# Patient Record
Sex: MALE | Race: WHITE | NOT HISPANIC OR LATINO | URBAN - METROPOLITAN AREA
[De-identification: names, ages, dates, MRNs, and addresses within clinical notes are randomized per-mention and may not be internally consistent; named-entity substitution may affect disease eponyms.]

---

## 2018-03-29 ENCOUNTER — EMERGENCY (EMERGENCY)
Facility: HOSPITAL | Age: 73
LOS: 1 days | Discharge: HOME | End: 2018-03-29
Attending: EMERGENCY MEDICINE | Admitting: EMERGENCY MEDICINE
Payer: SELF-PAY

## 2018-03-29 VITALS
HEART RATE: 112 BPM | TEMPERATURE: 99 F | OXYGEN SATURATION: 97 % | WEIGHT: 164.91 LBS | RESPIRATION RATE: 14 BRPM | DIASTOLIC BLOOD PRESSURE: 78 MMHG | SYSTOLIC BLOOD PRESSURE: 116 MMHG

## 2018-03-29 VITALS
DIASTOLIC BLOOD PRESSURE: 72 MMHG | OXYGEN SATURATION: 99 % | HEART RATE: 92 BPM | RESPIRATION RATE: 18 BRPM | SYSTOLIC BLOOD PRESSURE: 118 MMHG

## 2018-03-29 LAB
ALBUMIN SERPL ELPH-MCNC: 3.2 G/DL — LOW (ref 3.3–5)
ALP SERPL-CCNC: 352 U/L — HIGH (ref 40–120)
ALT FLD-CCNC: 39 U/L — SIGNIFICANT CHANGE UP (ref 12–78)
ANION GAP SERPL CALC-SCNC: 8 MMOL/L — SIGNIFICANT CHANGE UP (ref 5–17)
APPEARANCE UR: CLEAR — SIGNIFICANT CHANGE UP
AST SERPL-CCNC: 67 U/L — HIGH (ref 15–37)
BACTERIA # UR AUTO: NEGATIVE — SIGNIFICANT CHANGE UP
BASOPHILS # BLD AUTO: 0.2 K/UL — SIGNIFICANT CHANGE UP (ref 0–0.2)
BASOPHILS NFR BLD AUTO: 1.2 % — SIGNIFICANT CHANGE UP (ref 0–2)
BILIRUB SERPL-MCNC: 1.6 MG/DL — HIGH (ref 0.2–1.2)
BILIRUB UR-MCNC: ABNORMAL
BUN SERPL-MCNC: 31 MG/DL — HIGH (ref 7–23)
CALCIUM SERPL-MCNC: 9.2 MG/DL — SIGNIFICANT CHANGE UP (ref 8.5–10.1)
CHLORIDE SERPL-SCNC: 106 MMOL/L — SIGNIFICANT CHANGE UP (ref 96–108)
CO2 SERPL-SCNC: 27 MMOL/L — SIGNIFICANT CHANGE UP (ref 22–31)
COLOR SPEC: YELLOW — SIGNIFICANT CHANGE UP
CREAT SERPL-MCNC: 1.3 MG/DL — SIGNIFICANT CHANGE UP (ref 0.5–1.3)
DIFF PNL FLD: NEGATIVE — SIGNIFICANT CHANGE UP
EOSINOPHIL # BLD AUTO: 0.2 K/UL — SIGNIFICANT CHANGE UP (ref 0–0.5)
EOSINOPHIL NFR BLD AUTO: 1.7 % — SIGNIFICANT CHANGE UP (ref 0–6)
EPI CELLS # UR: NEGATIVE — SIGNIFICANT CHANGE UP
GLUCOSE SERPL-MCNC: 109 MG/DL — HIGH (ref 70–99)
GLUCOSE UR QL: NEGATIVE — SIGNIFICANT CHANGE UP
HCT VFR BLD CALC: 41.3 % — SIGNIFICANT CHANGE UP (ref 39–50)
HGB BLD-MCNC: 13.3 G/DL — SIGNIFICANT CHANGE UP (ref 13–17)
KETONES UR-MCNC: NEGATIVE — SIGNIFICANT CHANGE UP
LEUKOCYTE ESTERASE UR-ACNC: ABNORMAL
LYMPHOCYTES # BLD AUTO: 1.7 K/UL — SIGNIFICANT CHANGE UP (ref 1–3.3)
LYMPHOCYTES # BLD AUTO: 12.4 % — LOW (ref 13–44)
MCHC RBC-ENTMCNC: 29.7 PG — SIGNIFICANT CHANGE UP (ref 27–34)
MCHC RBC-ENTMCNC: 32.3 GM/DL — SIGNIFICANT CHANGE UP (ref 32–36)
MCV RBC AUTO: 92.1 FL — SIGNIFICANT CHANGE UP (ref 80–100)
MONOCYTES # BLD AUTO: 0.9 K/UL — SIGNIFICANT CHANGE UP (ref 0–0.9)
MONOCYTES NFR BLD AUTO: 6.2 % — SIGNIFICANT CHANGE UP (ref 1–9)
NEUTROPHILS # BLD AUTO: 11.1 K/UL — HIGH (ref 1.8–7.4)
NEUTROPHILS NFR BLD AUTO: 78.6 % — HIGH (ref 43–77)
NITRITE UR-MCNC: NEGATIVE — SIGNIFICANT CHANGE UP
PH UR: 5 — SIGNIFICANT CHANGE UP (ref 5–8)
PLATELET # BLD AUTO: 245 K/UL — SIGNIFICANT CHANGE UP (ref 150–400)
POTASSIUM SERPL-MCNC: 4.3 MMOL/L — SIGNIFICANT CHANGE UP (ref 3.5–5.3)
POTASSIUM SERPL-SCNC: 4.3 MMOL/L — SIGNIFICANT CHANGE UP (ref 3.5–5.3)
PROT SERPL-MCNC: 7.1 G/DL — SIGNIFICANT CHANGE UP (ref 6–8.3)
PROT UR-MCNC: NEGATIVE — SIGNIFICANT CHANGE UP
RBC # BLD: 4.49 M/UL — SIGNIFICANT CHANGE UP (ref 4.2–5.8)
RBC # FLD: 14.2 % — SIGNIFICANT CHANGE UP (ref 10.3–14.5)
RBC CASTS # UR COMP ASSIST: SIGNIFICANT CHANGE UP /HPF (ref 0–4)
SODIUM SERPL-SCNC: 141 MMOL/L — SIGNIFICANT CHANGE UP (ref 135–145)
SP GR SPEC: 1.01 — SIGNIFICANT CHANGE UP (ref 1.01–1.02)
UROBILINOGEN FLD QL: 4
WBC # BLD: 14.1 K/UL — HIGH (ref 3.8–10.5)
WBC # FLD AUTO: 14.1 K/UL — HIGH (ref 3.8–10.5)
WBC UR QL: SIGNIFICANT CHANGE UP

## 2018-03-29 PROCEDURE — 87086 URINE CULTURE/COLONY COUNT: CPT

## 2018-03-29 PROCEDURE — 99285 EMERGENCY DEPT VISIT HI MDM: CPT

## 2018-03-29 PROCEDURE — 80053 COMPREHEN METABOLIC PANEL: CPT

## 2018-03-29 PROCEDURE — 81001 URINALYSIS AUTO W/SCOPE: CPT

## 2018-03-29 PROCEDURE — 51702 INSERT TEMP BLADDER CATH: CPT

## 2018-03-29 PROCEDURE — 99284 EMERGENCY DEPT VISIT MOD MDM: CPT | Mod: 25

## 2018-03-29 PROCEDURE — 85027 COMPLETE CBC AUTOMATED: CPT

## 2018-03-29 NOTE — ED PROVIDER NOTE - PROGRESS NOTE DETAILS
Initial urine output about 900cc.  All results were explained to patient and/or family and a copy of all available results given, espcially about elevated wbc.

## 2018-03-29 NOTE — ED ADULT NURSE NOTE - PMH
Hip arthritis  lt  Hypertension CHF (congestive heart failure)  pedal edema  Hip arthritis  lt  Hypertension

## 2018-03-30 LAB
CULTURE RESULTS: NO GROWTH — SIGNIFICANT CHANGE UP
SPECIMEN SOURCE: SIGNIFICANT CHANGE UP

## 2018-04-23 ENCOUNTER — INPATIENT (INPATIENT)
Facility: HOSPITAL | Age: 73
LOS: 5 days | Discharge: HOSPICE | DRG: 435 | End: 2018-04-29
Attending: FAMILY MEDICINE | Admitting: FAMILY MEDICINE
Payer: SELF-PAY

## 2018-04-23 VITALS
HEART RATE: 142 BPM | RESPIRATION RATE: 14 BRPM | WEIGHT: 160.06 LBS | SYSTOLIC BLOOD PRESSURE: 123 MMHG | TEMPERATURE: 98 F | OXYGEN SATURATION: 97 % | DIASTOLIC BLOOD PRESSURE: 90 MMHG

## 2018-04-23 DIAGNOSIS — I48.91 UNSPECIFIED ATRIAL FIBRILLATION: ICD-10-CM

## 2018-04-23 PROBLEM — I10 ESSENTIAL (PRIMARY) HYPERTENSION: Chronic | Status: ACTIVE | Noted: 2018-03-29

## 2018-04-23 PROBLEM — M16.10 UNILATERAL PRIMARY OSTEOARTHRITIS, UNSPECIFIED HIP: Chronic | Status: ACTIVE | Noted: 2018-03-29

## 2018-04-23 LAB
ALBUMIN SERPL ELPH-MCNC: 2.5 G/DL — LOW (ref 3.3–5)
ALP SERPL-CCNC: 291 U/L — HIGH (ref 40–120)
ALT FLD-CCNC: 35 U/L — SIGNIFICANT CHANGE UP (ref 12–78)
AMYLASE P1 CFR SERPL: 63 U/L — SIGNIFICANT CHANGE UP (ref 25–115)
ANION GAP SERPL CALC-SCNC: 9 MMOL/L — SIGNIFICANT CHANGE UP (ref 5–17)
APPEARANCE UR: CLEAR — SIGNIFICANT CHANGE UP
AST SERPL-CCNC: 54 U/L — HIGH (ref 15–37)
BACTERIA # UR AUTO: ABNORMAL
BILIRUB SERPL-MCNC: 2.6 MG/DL — HIGH (ref 0.2–1.2)
BILIRUB UR-MCNC: NEGATIVE — SIGNIFICANT CHANGE UP
BUN SERPL-MCNC: 24 MG/DL — HIGH (ref 7–23)
CALCIUM SERPL-MCNC: 8.4 MG/DL — LOW (ref 8.5–10.1)
CHLORIDE SERPL-SCNC: 107 MMOL/L — SIGNIFICANT CHANGE UP (ref 96–108)
CK MB BLD-MCNC: 3.2 % — SIGNIFICANT CHANGE UP (ref 0–3.5)
CK MB CFR SERPL CALC: 2.1 NG/ML — SIGNIFICANT CHANGE UP (ref 0–3.6)
CK SERPL-CCNC: 66 U/L — SIGNIFICANT CHANGE UP (ref 26–308)
CO2 SERPL-SCNC: 24 MMOL/L — SIGNIFICANT CHANGE UP (ref 22–31)
COLOR SPEC: YELLOW — SIGNIFICANT CHANGE UP
CREAT SERPL-MCNC: 1.3 MG/DL — SIGNIFICANT CHANGE UP (ref 0.5–1.3)
DIFF PNL FLD: NEGATIVE — SIGNIFICANT CHANGE UP
EOSINOPHIL NFR BLD AUTO: 4 % — SIGNIFICANT CHANGE UP (ref 0–6)
EPI CELLS # UR: SIGNIFICANT CHANGE UP
GLUCOSE SERPL-MCNC: 103 MG/DL — HIGH (ref 70–99)
GLUCOSE UR QL: NEGATIVE — SIGNIFICANT CHANGE UP
HCT VFR BLD CALC: 43.2 % — SIGNIFICANT CHANGE UP (ref 39–50)
HGB BLD-MCNC: 13.8 G/DL — SIGNIFICANT CHANGE UP (ref 13–17)
INR BLD: 1.19 RATIO — HIGH (ref 0.88–1.16)
KETONES UR-MCNC: NEGATIVE — SIGNIFICANT CHANGE UP
LEUKOCYTE ESTERASE UR-ACNC: ABNORMAL
LG PLATELETS BLD QL AUTO: SLIGHT — SIGNIFICANT CHANGE UP
LIDOCAIN IGE QN: 223 U/L — SIGNIFICANT CHANGE UP (ref 73–393)
LYMPHOCYTES # BLD AUTO: 12 % — LOW (ref 13–44)
MCHC RBC-ENTMCNC: 29.4 PG — SIGNIFICANT CHANGE UP (ref 27–34)
MCHC RBC-ENTMCNC: 32 GM/DL — SIGNIFICANT CHANGE UP (ref 32–36)
MCV RBC AUTO: 91.9 FL — SIGNIFICANT CHANGE UP (ref 80–100)
MONOCYTES NFR BLD AUTO: 3 % — SIGNIFICANT CHANGE UP (ref 1–9)
NEUTROPHILS NFR BLD AUTO: 77 % — SIGNIFICANT CHANGE UP (ref 43–77)
NEUTS BAND # BLD: 2 % — SIGNIFICANT CHANGE UP (ref 0–8)
NITRITE UR-MCNC: NEGATIVE — SIGNIFICANT CHANGE UP
NT-PROBNP SERPL-SCNC: 5797 PG/ML — HIGH (ref 0–125)
PH UR: 5 — SIGNIFICANT CHANGE UP (ref 5–8)
PLAT MORPH BLD: NORMAL — SIGNIFICANT CHANGE UP
PLATELET # BLD AUTO: 182 K/UL — SIGNIFICANT CHANGE UP (ref 150–400)
POTASSIUM SERPL-MCNC: 5.2 MMOL/L — SIGNIFICANT CHANGE UP (ref 3.5–5.3)
POTASSIUM SERPL-SCNC: 5.2 MMOL/L — SIGNIFICANT CHANGE UP (ref 3.5–5.3)
PROT SERPL-MCNC: 6.4 G/DL — SIGNIFICANT CHANGE UP (ref 6–8.3)
PROT UR-MCNC: NEGATIVE — SIGNIFICANT CHANGE UP
PROTHROM AB SERPL-ACNC: 13 SEC — HIGH (ref 9.8–12.7)
RBC # BLD: 4.7 M/UL — SIGNIFICANT CHANGE UP (ref 4.2–5.8)
RBC # FLD: 15.1 % — HIGH (ref 10.3–14.5)
RBC BLD AUTO: SIGNIFICANT CHANGE UP
RBC CASTS # UR COMP ASSIST: NEGATIVE /HPF — SIGNIFICANT CHANGE UP (ref 0–4)
SODIUM SERPL-SCNC: 140 MMOL/L — SIGNIFICANT CHANGE UP (ref 135–145)
SP GR SPEC: 1.01 — SIGNIFICANT CHANGE UP (ref 1.01–1.02)
TROPONIN I SERPL-MCNC: <.015 NG/ML — SIGNIFICANT CHANGE UP (ref 0.01–0.04)
TROPONIN I SERPL-MCNC: <.015 NG/ML — SIGNIFICANT CHANGE UP (ref 0.01–0.04)
UROBILINOGEN FLD QL: NEGATIVE — SIGNIFICANT CHANGE UP
VARIANT LYMPHS # BLD: 2 % — SIGNIFICANT CHANGE UP (ref 0–6)
WBC # BLD: 13.7 K/UL — HIGH (ref 3.8–10.5)
WBC # FLD AUTO: 13.7 K/UL — HIGH (ref 3.8–10.5)
WBC UR QL: ABNORMAL

## 2018-04-23 PROCEDURE — 71260 CT THORAX DX C+: CPT | Mod: 26

## 2018-04-23 PROCEDURE — 76705 ECHO EXAM OF ABDOMEN: CPT | Mod: 26

## 2018-04-23 PROCEDURE — 74177 CT ABD & PELVIS W/CONTRAST: CPT | Mod: 26

## 2018-04-23 PROCEDURE — 93010 ELECTROCARDIOGRAM REPORT: CPT

## 2018-04-23 PROCEDURE — 71045 X-RAY EXAM CHEST 1 VIEW: CPT | Mod: 26

## 2018-04-23 PROCEDURE — 99285 EMERGENCY DEPT VISIT HI MDM: CPT

## 2018-04-23 RX ORDER — ENOXAPARIN SODIUM 100 MG/ML
80 INJECTION SUBCUTANEOUS
Qty: 0 | Refills: 0 | Status: DISCONTINUED | OUTPATIENT
Start: 2018-04-23 | End: 2018-04-23

## 2018-04-23 RX ORDER — ENOXAPARIN SODIUM 100 MG/ML
75 INJECTION SUBCUTANEOUS ONCE
Qty: 0 | Refills: 0 | Status: COMPLETED | OUTPATIENT
Start: 2018-04-23 | End: 2018-04-23

## 2018-04-23 RX ORDER — OXYCODONE AND ACETAMINOPHEN 5; 325 MG/1; MG/1
1 TABLET ORAL EVERY 4 HOURS
Qty: 0 | Refills: 0 | Status: DISCONTINUED | OUTPATIENT
Start: 2018-04-23 | End: 2018-04-29

## 2018-04-23 RX ORDER — ENOXAPARIN SODIUM 100 MG/ML
70 INJECTION SUBCUTANEOUS EVERY 12 HOURS
Qty: 0 | Refills: 0 | Status: DISCONTINUED | OUTPATIENT
Start: 2018-04-24 | End: 2018-04-24

## 2018-04-23 RX ORDER — FUROSEMIDE 40 MG
20 TABLET ORAL DAILY
Qty: 0 | Refills: 0 | Status: DISCONTINUED | OUTPATIENT
Start: 2018-04-23 | End: 2018-04-23

## 2018-04-23 RX ORDER — TRAMADOL HYDROCHLORIDE 50 MG/1
0 TABLET ORAL
Qty: 0 | Refills: 0 | COMMUNITY

## 2018-04-23 RX ORDER — IPRATROPIUM/ALBUTEROL SULFATE 18-103MCG
3 AEROSOL WITH ADAPTER (GRAM) INHALATION EVERY 8 HOURS
Qty: 0 | Refills: 0 | Status: DISCONTINUED | OUTPATIENT
Start: 2018-04-23 | End: 2018-04-29

## 2018-04-23 RX ORDER — PANTOPRAZOLE SODIUM 20 MG/1
40 TABLET, DELAYED RELEASE ORAL
Qty: 0 | Refills: 0 | Status: DISCONTINUED | OUTPATIENT
Start: 2018-04-23 | End: 2018-04-29

## 2018-04-23 RX ORDER — METOPROLOL TARTRATE 50 MG
25 TABLET ORAL EVERY 6 HOURS
Qty: 0 | Refills: 0 | Status: DISCONTINUED | OUTPATIENT
Start: 2018-04-23 | End: 2018-04-24

## 2018-04-23 RX ORDER — POTASSIUM CHLORIDE 20 MEQ
10 PACKET (EA) ORAL DAILY
Qty: 0 | Refills: 0 | Status: DISCONTINUED | OUTPATIENT
Start: 2018-04-23 | End: 2018-04-27

## 2018-04-23 RX ORDER — POTASSIUM CHLORIDE 20 MEQ
0 PACKET (EA) ORAL
Qty: 0 | Refills: 0 | COMMUNITY

## 2018-04-23 RX ORDER — LOSARTAN POTASSIUM 100 MG/1
25 TABLET, FILM COATED ORAL DAILY
Qty: 0 | Refills: 0 | Status: DISCONTINUED | OUTPATIENT
Start: 2018-04-23 | End: 2018-04-23

## 2018-04-23 RX ORDER — ATENOLOL 25 MG/1
1 TABLET ORAL
Qty: 0 | Refills: 0 | COMMUNITY

## 2018-04-23 RX ORDER — FUROSEMIDE 40 MG
40 TABLET ORAL ONCE
Qty: 0 | Refills: 0 | Status: COMPLETED | OUTPATIENT
Start: 2018-04-23 | End: 2018-04-23

## 2018-04-23 RX ORDER — FUROSEMIDE 40 MG
40 TABLET ORAL DAILY
Qty: 0 | Refills: 0 | Status: DISCONTINUED | OUTPATIENT
Start: 2018-04-23 | End: 2018-04-26

## 2018-04-23 RX ORDER — ACETAMINOPHEN 500 MG
325 TABLET ORAL EVERY 4 HOURS
Qty: 0 | Refills: 0 | Status: DISCONTINUED | OUTPATIENT
Start: 2018-04-23 | End: 2018-04-26

## 2018-04-23 RX ORDER — LOSARTAN POTASSIUM 100 MG/1
1 TABLET, FILM COATED ORAL
Qty: 0 | Refills: 0 | COMMUNITY

## 2018-04-23 RX ORDER — ASPIRIN/CALCIUM CARB/MAGNESIUM 324 MG
81 TABLET ORAL DAILY
Qty: 0 | Refills: 0 | Status: DISCONTINUED | OUTPATIENT
Start: 2018-04-23 | End: 2018-04-24

## 2018-04-23 RX ORDER — SODIUM CHLORIDE 9 MG/ML
1000 INJECTION INTRAMUSCULAR; INTRAVENOUS; SUBCUTANEOUS ONCE
Qty: 0 | Refills: 0 | Status: COMPLETED | OUTPATIENT
Start: 2018-04-23 | End: 2018-04-23

## 2018-04-23 RX ORDER — SENNA PLUS 8.6 MG/1
2 TABLET ORAL AT BEDTIME
Qty: 0 | Refills: 0 | Status: DISCONTINUED | OUTPATIENT
Start: 2018-04-23 | End: 2018-04-29

## 2018-04-23 RX ORDER — FUROSEMIDE 40 MG
1 TABLET ORAL
Qty: 0 | Refills: 0 | COMMUNITY

## 2018-04-23 RX ORDER — IOHEXOL 300 MG/ML
30 INJECTION, SOLUTION INTRAVENOUS ONCE
Qty: 0 | Refills: 0 | Status: COMPLETED | OUTPATIENT
Start: 2018-04-23 | End: 2018-04-23

## 2018-04-23 RX ADMIN — Medication 25 MILLIGRAM(S): at 23:52

## 2018-04-23 RX ADMIN — SODIUM CHLORIDE 1000 MILLILITER(S): 9 INJECTION INTRAMUSCULAR; INTRAVENOUS; SUBCUTANEOUS at 14:28

## 2018-04-23 RX ADMIN — SENNA PLUS 2 TABLET(S): 8.6 TABLET ORAL at 23:52

## 2018-04-23 RX ADMIN — IOHEXOL 30 MILLILITER(S): 300 INJECTION, SOLUTION INTRAVENOUS at 14:28

## 2018-04-23 RX ADMIN — ENOXAPARIN SODIUM 75 MILLIGRAM(S): 100 INJECTION SUBCUTANEOUS at 20:22

## 2018-04-23 RX ADMIN — Medication 40 MILLIGRAM(S): at 14:55

## 2018-04-23 RX ADMIN — Medication 25 MILLIGRAM(S): at 18:23

## 2018-04-23 NOTE — ED ADULT TRIAGE NOTE - CHIEF COMPLAINT QUOTE
sent in ED for abnormal lab result - elevated Bilirubin. pt had a RUQ abdominal pain, intermittently 5 days ago.

## 2018-04-23 NOTE — H&P ADULT - NSTOBACCOEDUHP_GEN_A_CS
HISTORY OF PRESENT ILLNESS     OCTAVIO Khan presents for complete physical exam.  The patient was last seen by me about 6 months ago.  He has no acute complaints or concerns otherwise today.  He continues on tikosyn as he went into Afib after going off it.      PAST MEDICAL, FAMILY AND SOCIAL HISTORY     Patient Active Problem List   Diagnosis   • Hypertension   • Encounter for therapeutic drug monitoring   • Long term (current) use of anticoagulants   • Hyperhidrosis   • Overactive bladder   • Paroxysmal atrial fibrillation (CMS/HCC)   • High risk medication use Tikosyn       Current Outpatient Prescriptions   Medication Sig Dispense Refill   • topiramate (TOPAMAX) 50 MG tablet TAKE 1 TABLET BY MOUTH TWICE DAILY 180 tablet 0   • metoPROLOL (TOPROL-XL) 100 MG 24 hr tablet TAKE 1 TABLET BY MOUTH DAILY 90 tablet 0   • warfarin (COUMADIN) 5 MG tablet Take 15 mg (3 tablets) on Tues & Thurs and 10 mg (2 tablets) all other days or as directed. 200 tablet 3   • dofetilide (TIKOSYN) 500 MCG capsule Take 1 capsule by mouth every 12 hours. 180 capsule 3   • lisinopril (ZESTRIL) 10 MG tablet Take 1 tablet by mouth daily. 90 tablet 3   • acetaminophen (TYLENOL) 500 MG tablet Take 1,000 mg by mouth 2 times daily as needed (ankle pain). Dose: 2 tabs= 1000mg     • Camphor-Eucalyptus-Menthol (VICKS VAPORUB EX) Apply 1 application topically nightly.     • SODIUM BICARBONATE PO Take 1 Dose by mouth daily as needed. Indications: Acid Indigestion 1 dose= 1 teaspoon     • aspirin 81 MG tablet Take 1 tablet by mouth daily. 25 tablet 3     No current facility-administered medications for this visit.        I have reviewed the patient's medications and allergies, past medical, surgical, social and family history, updating these as appropriate.  See histories section of the electronic medical record for a display of this information.    REVIEW OF SYSTEMS     Review of Systems   Constitutional: Negative for chills, diaphoresis and fever.    Respiratory: Negative for cough, shortness of breath and wheezing.    Cardiovascular: Negative for chest pain, palpitations and leg swelling.   Gastrointestinal: Negative for diarrhea, nausea and vomiting.       PHYSICAL EXAM     Visit Vitals  /78   Pulse 72   Ht 6' 2\" (1.88 m)   Wt (!) 144.5 kg   BMI 40.91 kg/m²       Physical Exam   Constitutional: No distress.   HENT:   Head: Normocephalic and atraumatic.   Right Ear: Tympanic membrane and ear canal normal.   Left Ear: Tympanic membrane and ear canal normal.   Nose: No rhinorrhea.   Mouth/Throat: Oropharynx is clear and moist and mucous membranes are normal.   Eyes: Conjunctivae are normal.   Neck: No thyromegaly present.   Cardiovascular: Normal rate and regular rhythm.    No murmur heard.  Pulmonary/Chest: Effort normal. He has no wheezes. He has no rhonchi. He has no rales.   Abdominal: Soft. Bowel sounds are normal. There is no splenomegaly or hepatomegaly. There is no tenderness.   Lymphadenopathy:        Head (right side): No submandibular, no preauricular and no posterior auricular adenopathy present.        Head (left side): No submandibular, no preauricular and no posterior auricular adenopathy present.     He has no cervical adenopathy.        Right: No supraclavicular adenopathy present.        Left: No supraclavicular adenopathy present.   Neurological: He is alert.   Skin: Skin is warm and dry. No rash noted.   Psychiatric: He has a normal mood and affect. His behavior is normal.     RESULTS      Lab Services on 11/14/2017   Component Date Value Ref Range Status   • Fasting Status 11/15/2017 10  hrs Final   • Sodium 11/15/2017 139  135 - 145 mmol/L Final   • Potassium 11/15/2017 4.5  3.4 - 5.1 mmol/L Final   • Chloride 11/15/2017 103  98 - 107 mmol/L Final   • Carbon Dioxide 11/15/2017 26  21 - 32 mmol/L Final   • Anion Gap 11/15/2017 14  10 - 20 mmol/L Final   • Glucose 11/15/2017 78  65 - 99 mg/dL Final   • BUN 11/15/2017 14  6 - 20 mg/dL  Final   • Creatinine 11/15/2017 0.98  0.67 - 1.17 mg/dL Final   • GFR Estimate,  11/15/2017 >90   Final   • GFR Estimate, Non  11/15/2017 90   Final   • BUN/Creatinine Ratio 11/15/2017 14  7 - 25 Final   • CALCIUM 11/15/2017 8.9  8.4 - 10.2 mg/dL Final   • FASTING STATUS 11/15/2017 10  hrs Final   • CHOLESTEROL 11/15/2017 132  <200 mg/dL Final   • CALCULATED LDL 11/15/2017 73  <130 mg/dL Final   • HDL 11/15/2017 29* >39 mg/dL Final   • TRIGLYCERIDE 11/15/2017 151* <150 mg/dL Final   • CALCULATED NON HDL 11/15/2017 103  mg/dL Final   • CHOL/HDL 11/15/2017 4.6* <4.5 Final       ASSESSMENT/PLAN     ASSESSMENT:  1. Annual physical exam    2. Screen for colon cancer        PLAN:  Orders Placed This Encounter   • SERVICE TO SURGERY GENERAL       In addition to being tobacco free and maintaining a healthy weight, I have recommended 30 minutes of cardiovascular exercise 3-5 times a week and attention to a balanced diet with controlled portion sizes.    Blood Pressure Management:     BP Readings from Last 3 Encounters:   11/21/17 118/78   11/08/17 126/82   09/05/17 122/78     Overall course of hypertension is unchanged and well controlled.  Goal of treatment is <140/90.  No change to the present treatment plan for hypertension.  Continue with current medication and lifestyle modifications.  Reviewed with the patient things he can do to improve his blood pressure.  It would be helpful for the patient to monitor his blood pressure outside the office.  Reviewed with him lifestyle modifications to reduce his risk for hypertensive complications and atherosclerotic cardiovascular disease.  Discussed the role of diet, specifically limiting sodium intake.  He should limit sodium to 1500 mg a day by not adding salt to food, checking food labels for sodium content, and avoiding canned and processed foods.  His diet should include lots of fruits and vegetables and be low in saturated fat.  He should  maintain a healthy weight and engage in 30 minutes of cardiovascular exercise 3-5 times a week.  Additionally, he should avoid tobacco, alcohol, caffeine, and stress.     Return in about 6 months (around 5/21/2018) for HTN.    He will call or return to the office for any persistent, worsening, or concerning symptoms.  For any emergencies, he will present to the emergency room or call 911.    The patient understands, agrees, and will comply with today's plan.     Offered and provided

## 2018-04-23 NOTE — H&P ADULT - NSHPREVIEWOFSYSTEMS_GEN_ALL_CORE
htn  smoker cig,years ago  very active   achillis tendon rupture over 10 years ago,healed on its own  has refused all preventative care htn,djd sees dr morse rheumatology from 2017  smoker cig,,last about 6 months ago  very active   achillis tendon rupture over 10 years ago,healed on its own  has refused all preventative care  last visit was 2/18 he came in for refill bp meds and wanting clearance for hip replacement  I said no,,,need to see cardilogy,need cxr  he agreed to cardilogy,,,saw dr thakkar  has refused colonoscopy cxr every year since I have known him,,over 10 years

## 2018-04-23 NOTE — ED PROVIDER NOTE - CARE PLAN
Principal Discharge DX:	New onset atrial fibrillation  Secondary Diagnosis:	Elevated bilirubin Principal Discharge DX:	New onset atrial fibrillation  Secondary Diagnosis:	Elevated bilirubin  Secondary Diagnosis:	Mass

## 2018-04-23 NOTE — ED PROVIDER NOTE - MEDICAL DECISION MAKING DETAILS
72 male with elevated bilirubin, f/u labs, ct chest, abdomen/pelvis, new onset afib, call cardio, rate control

## 2018-04-23 NOTE — ED ADULT NURSE NOTE - CHPI ED SYMPTOMS NEG
no chest pain/no back pain/no nausea/no fever/no diaphoresis/no cough/no chills/no vomiting/no dizziness/no syncope/no shortness of breath

## 2018-04-23 NOTE — CONSULT NOTE ADULT - SUBJECTIVE AND OBJECTIVE BOX
CARDIOLOGY CONSULT NOTE    Patient is a 72y Male with a known history of :    HTN and OA of left hip.  Pt had blood work done and found to have elevated bilirubin, alk phos, and transaminases.  Refered to ER for further evaluation.    While in ER, found to be in Afib, new.  Pt seen by cardiologist Dr Del Real 6 weeks ago and was in sinus rhythm at that time.  He was advised to get echo and perfusion stress test and pt did not follow up.    Pt c/o right lower costal pain, RUQ pain, on and off for 1 week.  Each bout about 15 - 30 secs, moderate intensity.    Yesterday had a few bouts, none today.    Admits to not feeling as well past 3-4 weeks with increased tiredness, slight GOODWIN and lost of appetite.  No palpitation or lightheadedness.  Chronic edema for about one year.          REVIEW OF SYSTEMS:    CONSTITUTIONAL: +fatigue  NECK: No pain or stiffness  RESPIRATORY: No shortness of breath, slight GOODWIN  CARDIOVASCULAR: No chest pain, palpitations, dizziness,  chronic leg swelling x 1 year  GASTROINTESTINAL: +RUQ/RL costal pain.  No melena or hematochezia.  GENITOURINARY: No dysuria, frequency, hematuria, or incontinence  NEUROLOGICAL: No headaches, memory loss, loss of strength, numbness, or tremors  MUSCULOSKELETAL:  + hip pain.  PSYCHIATRIC: No depression, anxiety, mood swings, or difficulty sleeping    MEDICATIONS  (STANDING):    MEDICATIONS  (PRN):      ALLERGIES: No Known Allergies      FAMILY HISTORY:      PHYSICAL EXAMINATION:  -----------------------------  T(C): 36.7 (04-23-18 @ 13:51), Max: 36.7 (04-23-18 @ 13:51)  HR: 107 (04-23-18 @ 14:57) (107 - 147)  BP: 109/76 (04-23-18 @ 14:57) (104/74 - 123/90)  RR: 14 (04-23-18 @ 14:50) (14 - 14)  SpO2: 96% (04-23-18 @ 14:50) (96% - 97%)  Wt(kg): --    04-23 @ 07:01  -  04-23 @ 17:35  --------------------------------------------------------  IN:  Total IN: 0 mL    OUT:    Voided: 300 mL  Total OUT: 300 mL    Total NET: -300 mL          Weight (kg): 72.6 (04-23 @ 13:51)    Constitutional: well developed, normal appearance no acute distress.   Neck: normal jugular normal jugular venous V waves present and no jugular venous mckeon A waves.   Pulmonary: no respiratory distress,  clear to auscultation bilaterally.   Cardiovascular:   irreg heart rate and rhythm.    no murmur  Abdomen: softly distended, non-tender  Musculoskeletal: the gait could not be assessed..   Extremities: B/L 2+ edema      LABS:   --------  04-23    140  |  107  |  24<H>  ----------------------------<  103<H>  5.2   |  24  |  1.30    Ca    8.4<L>      23 Apr 2018 14:30    TPro  6.4  /  Alb  2.5<L>  /  TBili  2.6<H>  /  DBili  x   /  AST  54<H>  /  ALT  35  /  AlkPhos  291<H>  04-23                         13.8   13.7  )-----------( 182      ( 23 Apr 2018 14:30 )             43.2     PT/INR - ( 23 Apr 2018 14:30 )   PT: 13.0 sec;   INR: 1.19 ratio           04-23 @ 14:30 BNP: 5797 pg/mL    04-23 @ 14:30 CPK total:--, CKMB --, Troponin I - <.015 ng/mL          RADIOLOGY:  -----------------        ECG:   afib with diffuse ST T abn across precordium, more pronounced than EKG from 3/2018

## 2018-04-23 NOTE — CONSULT NOTE ADULT - ASSESSMENT
Pt with elevated liver chemistries, Right lower costal pain, with sono noted for liver mass, portal vein thrombosis.  Afib with rapid rate, relatively new onset (was in NSR 3/13/2018), chronic edema ?CHF, diffuse ST and T abnormalities on EKG, could be ischemic changes.    AFIB  -  rate control, add metoprolol 50 mg x1 now then 25 mg q6h with parameters.  -  if no contraindication to AC, will start Eliquis 5 mg BID.  -  Echo    ischemic ST T changes  -  could be rate related changes.  However pt has extensive tob hx, with significant risk for CAD.  Check trop.  Check echo.  continue asa 81 and metop      CHF?  - chronic edema despite lasix.  elevated pBNP.  Rec add lasix diuresis.      Awaiting work up of liver mass and portal vein thrombosis.  Then determine additional cardaic workup, consider DCCV, outpt perfusion study. Pt with elevated liver chemistries, Right lower costal pain, with sono noted for liver mass, portal vein thrombosis.  Afib with rapid rate, relatively new onset (was in NSR 3/13/2018), chronic edema ?CHF, diffuse ST and T abnormalities on EKG, could be ischemic changes.    AFIB  -  rate control, IV cardizem then metoprolol  25 mg q6h with parameters.  -  if no contraindication to AC, would start Eliquis 5 mg BID.  Due to liver mass, will await CT abdomen result.    If pt needs biopsy or procedure, then start enoxaparin.  -  Echo    ischemic ST T changes  -  could be rate related changes.  However pt has extensive tob hx, with significant risk for CAD.  Check trop.  Check echo.  continue asa 81 and metop      CHF?  - chronic edema despite lasix.  will increase lasix to 40 mg qam.  elevated pBNP.      Awaiting work up of liver mass and portal vein thrombosis.  Then determine additional cardaic workup, consider DCCV, outpt perfusion study.

## 2018-04-23 NOTE — ED ADULT NURSE NOTE - OBJECTIVE STATEMENT
pt presenting to ED for evaluation of elevated bilirubin, patient seen by PMD for RUQ discomfort. patient with yellow sclera, denies pain or discomfort today. patient arrived to ED with irreg tachycardiac, cardiac monitoring ongoing. patient denies palpitations, rapid heart rate, chest pain or any other discomfort. Will continue to monitor

## 2018-04-23 NOTE — ED PROVIDER NOTE - OBJECTIVE STATEMENT
72 male sent to ER for evaluation of elevated bilirubin and liver enzymes. Patient states for the last month he has had lower extremity edema, for the past week he has had exertional dyspnea and for the past 2 days he has had right upper quadrant discomfort.

## 2018-04-23 NOTE — H&P ADULT - NSHPSOCIALHISTORY_GEN_ALL_CORE
renetta johnson in the past  over 10 years ago last cig 6months ago,per denita last cig march before hospital admission

## 2018-04-23 NOTE — H&P ADULT - HISTORY OF PRESENT ILLNESS
patient came to the office for ruq pain  sob for 1 week  saw dr morse friday,lft mildly elevated,was told to be seen  patient has refused all preventative w/u in the past,except labs and bp medications  refused cxr/colonoscopy  when he came in today,i knew there was a proble,,,he usually will come only once a year  he had ruq pain,hepatomegally,leg edema 2+  i sent him to the er,,i explained it may be cancer primarily or simpler like acute choly/pancreatitis  girl friend was present patient came to the office for ruq pain  sob for 1 week  saw dr morse friday,lft mildly elevated,was told to be seen  patient has refused all preventative w/u in the past,except labs and bp medications  refused cxr/colonoscopy  when he came in today,i knew there was a problem,,he usually will come only once a year  he had ruq pain,hepatomegally,leg edema 2+  i sent him to the er,,i explained it may be cancer primarily or simpler like acute choly/pancreatitis  girl friend was present

## 2018-04-23 NOTE — ED PROVIDER NOTE - PROGRESS NOTE DETAILS
paged cardiology Dr. Del Real, Dr. Milady Quezada covering, discussed new onset afib, will come to see the patient case dw Atilio Blanco

## 2018-04-23 NOTE — CONSULT NOTE ADULT - ASSESSMENT
ESTELA PARHAM [facility]    ALLERGY nka   CONTACT Lydia Alcantara [phone]   REASON FOR VISIT   Initial evaluation/Pulmonary consultation requested 4/23/2018  by Dr Mclean from Dr Machuca   Patient examined chart reviewed  HOSPITAL ADMISSION [facility] 4/23/2018  Dr Beti Mclean   PATIENT CAME  FROM (if information available)      VITALS/LABS  4/23/2018 W 13.7 Hb 13.8 Plt 182 INR 119Na 140 K 5.2 CO2 24 Cr 1.3   4/23/2018 alb 2.5   4/23/2018 BR 2.6  (i) AST 54 (i) ALT 35   4/23 Tr 1 n.2   4/23/2018 ua 1010 L estr sm W 6-10   4/23/2018 CT Ch wc 1) Large conglomerate r hilar and mediastinal mass whch encases and markedly narrows the truncus arteriosus 2) Confluent involvement of the R paratracheal AP mediastinal and subcarinal mediastunum extending to the azygoesophageal recess 3) Mass surrounds and mildly narrows the bronchus intermediums 4) 2.5 cm peripheral mass ant rul with extension to the pleural surface amd spiculated margins 5) Small l sided pl effsn with compr atelectasis 6) R liver mass 7 cm central necrosis 7) Thrombosed main portal vein 8) Varices 8) Mod abd pelvic ascites     REVIEW OF SYMPTOMS    Able to give ROS  Yes     RELIABLE No   CONSTITUTIONAL Weakness Yes  Chills No Vision changes No  ENDOCRINE No unexplained hair loss No heat or cold intolerance    ALLERGY No hives  Sore throat No   RESP Coughing blood no  Shortness of breath YES   NEURO No Headache  Confusion Pain neck No   CARDIAC No Chest pain No Palpitations   GI No Pain abdomen NO   Vomiting NO     PHYSICAL EXAM    HEENT Unremarkable PERRLA atraumatic   RESP Fair air entry EXP prolonged    Harsh breath sound Resp distres mild   CARDIAC S1 S2 No S3     NO JVD    ABDOMEN SOFT BS PRESENT NOT DISTENDED No hepatosplenomegaly PEDAL EDEMA present No calf tenderness  NO rash   GENERAL Not TOXIC looking    GLOBAL ISSUE/BEST PRACTICE:        PROBLEM: Analgesia:     na                        PROBLEM: Sedation:     na               PROBLEM: HOB elevation:   y            PROBLEM: Stress ulcer proph:    na                      PROBLEM: VTE prophylaxis:      Lvnx 70.2 (4/23)   PROBLEM: Glycemic control:    na  PROBLEM: Nutrition:    reg diet (4/23)   PROBLEM: Advanced directive: na     PROBLEM: Allergies:  na    BRIEF PATIENT DESCRIPTION ADMISSION  72 male former smoker PMH CHF Hip arthritis Htn sent to ER for evaluation of elevated bilirubin and liver enzymes found during preop testing for hip replacement . Patient states for the last month he has had lower extremity edema, for the past week he has had exertional dyspnea and for the past 2 days he has had right upper quadrant discomfort.  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 Risks, benefits, alternatives  discussed in detail. Proper antibiotic use issues addressed Questions/concerns  were addressed  as  best as possible As applicable to this patient the following issues were addressed Pain was  assessed and addresed.Pt was screened for signs of clinical depression .Appropriate referral made.Risk of falls assessed.Fall prevention d/w family .Pt was screened for tobacco and etoh,counseling was done for etoh and tobacco cessation as applicable .Use of narcotic pain meds was discussed as applicable including  to use narcotic meds  wisely and to avoid overusing them

## 2018-04-24 DIAGNOSIS — I82.0 BUDD-CHIARI SYNDROME: ICD-10-CM

## 2018-04-24 DIAGNOSIS — R91.8 OTHER NONSPECIFIC ABNORMAL FINDING OF LUNG FIELD: ICD-10-CM

## 2018-04-24 DIAGNOSIS — R76.8 OTHER SPECIFIED ABNORMAL IMMUNOLOGICAL FINDINGS IN SERUM: ICD-10-CM

## 2018-04-24 DIAGNOSIS — K74.60 UNSPECIFIED CIRRHOSIS OF LIVER: ICD-10-CM

## 2018-04-24 DIAGNOSIS — C22.9 MALIGNANT NEOPLASM OF LIVER, NOT SPECIFIED AS PRIMARY OR SECONDARY: ICD-10-CM

## 2018-04-24 DIAGNOSIS — I48.91 UNSPECIFIED ATRIAL FIBRILLATION: ICD-10-CM

## 2018-04-24 PROBLEM — I50.9 HEART FAILURE, UNSPECIFIED: Chronic | Status: INACTIVE | Noted: 2018-03-29 | Resolved: 2018-04-23

## 2018-04-24 LAB
AFP-TM SERPL-MCNC: 51.5 NG/ML — HIGH
CEA SERPL-MCNC: 10.6 NG/ML — HIGH (ref 0–3.8)
ERYTHROCYTE [SEDIMENTATION RATE] IN BLOOD: 7 MM/HR — SIGNIFICANT CHANGE UP (ref 0–20)
FERRITIN SERPL-MCNC: 452 NG/ML — HIGH (ref 30–400)
HAV IGM SER-ACNC: SIGNIFICANT CHANGE UP
HBA1C BLD-MCNC: 5.3 % — SIGNIFICANT CHANGE UP (ref 4–5.6)
HBV CORE IGM SER-ACNC: SIGNIFICANT CHANGE UP
HBV SURFACE AG SER-ACNC: SIGNIFICANT CHANGE UP
HCV AB S/CO SERPL IA: 11.44 S/CO — SIGNIFICANT CHANGE UP
HCV AB SERPL-IMP: REACTIVE
NT-PROBNP SERPL-SCNC: 3880 PG/ML — HIGH (ref 0–125)
RHEUMATOID FACT SERPL-ACNC: 26 IU/ML — HIGH (ref 0–13)
TROPONIN I SERPL-MCNC: <.015 NG/ML — SIGNIFICANT CHANGE UP (ref 0.01–0.04)

## 2018-04-24 PROCEDURE — 93970 EXTREMITY STUDY: CPT | Mod: 26

## 2018-04-24 RX ORDER — METOPROLOL TARTRATE 50 MG
50 TABLET ORAL THREE TIMES A DAY
Qty: 0 | Refills: 0 | Status: DISCONTINUED | OUTPATIENT
Start: 2018-04-24 | End: 2018-04-25

## 2018-04-24 RX ADMIN — Medication 10 MILLIEQUIVALENT(S): at 11:40

## 2018-04-24 RX ADMIN — Medication 50 MILLIGRAM(S): at 12:32

## 2018-04-24 RX ADMIN — Medication 25 MILLIGRAM(S): at 05:41

## 2018-04-24 RX ADMIN — ENOXAPARIN SODIUM 70 MILLIGRAM(S): 100 INJECTION SUBCUTANEOUS at 09:26

## 2018-04-24 RX ADMIN — Medication 50 MILLIGRAM(S): at 21:08

## 2018-04-24 RX ADMIN — PANTOPRAZOLE SODIUM 40 MILLIGRAM(S): 20 TABLET, DELAYED RELEASE ORAL at 05:41

## 2018-04-24 RX ADMIN — Medication 40 MILLIGRAM(S): at 05:41

## 2018-04-24 NOTE — PROGRESS NOTE ADULT - ASSESSMENT
Pt with elevated liver chemistries, Right lower costal pain, with sono noted for liver mass, portal vein thrombosis.  Afib with rapid rate, relatively new onset (was in NSR 3/13/2018), chronic edema ?CHF, diffuse ST and T abnormalities on EKG, could be ischemic changes.    AFIB  -  rate control, IV cardizem then metoprolol  25 mg q6h with parameters.  -  if no contraindication to AC, would start Eliquis 5 mg BID.  Due to liver mass, will await CT abdomen result.    If pt needs biopsy or procedure, then start enoxaparin.  -  Echo    ischemic ST T changes  -  could be rate related changes.  However pt has extensive tob hx, with significant risk for CAD.  Check trop.  Check echo.  continue asa 81 and metop      CHF?  - chronic edema despite lasix.  will increase lasix to 40 mg qam.  elevated pBNP.      Awaiting work up of liver mass and portal vein thrombosis.  Then determine additional cardaic workup, consider DCCV, outpt perfusion study. 72M getting work up for severe left Hip OA and replacement found with elevated liver chemistries, Right lower costal pain, with sono noted for liver mass, portal vein thrombosis. CT in hospital shows lung masses in addition to liver mass and ascites.  Afib with better rapid rate, relatively new onset (was in NSR 3/13/2018), chronic edema Acute on chronic diastolic HF (HFpEF), diffuse ST and T abnormalities on EKG, could be ischemic changes. These were noted before.    AFIB  -  rate control, increase metoprolol  50 mg q8h with hold parameters.  -  Continue with Lovenox full dose for Afib and primary stroke prophylaxis  -  Echo    ischemic ST T changes  -  These were present prior to afib  However pt has extensive tob hx, with significant risk for CAD.  Troponin negative for MI.  Echo with rapid heart rates and possible anterior wall hypokinesis with EF 50-55%.  continue asa 81 and metop      CHF?  - chronic edema despite lasix.  Continue with Lasix 40 mg qam.  elevated pBNP.      Awaiting work up of liver mass and portal vein thrombosis.  Then determine additional ischemic cardiac workup,   Will do SHARA DCCV if BP drops too much with increased metoprolol.  OVerall guarded prognosis.

## 2018-04-24 NOTE — PROGRESS NOTE ADULT - PROBLEM SELECTOR PLAN 1
for thoracentesis tomorrow  pulm on case  oncology was called will be seen tomorrow  I spoke to patient candidly and of its seriousness  he understood

## 2018-04-24 NOTE — PROGRESS NOTE ADULT - SUBJECTIVE AND OBJECTIVE BOX
PAST MEDICAL & SURGICAL HISTORY:  Hip arthritis: lt  Hypertension  No significant past surgical history      MEDICATIONS  (STANDING):  ALBUTerol/ipratropium for Nebulization 3 milliLiter(s) Nebulizer every 8 hours  enoxaparin Injectable 70 milliGRAM(s) SubCutaneous every 12 hours  furosemide    Tablet 40 milliGRAM(s) Oral daily  metoprolol tartrate 50 milliGRAM(s) Oral three times a day  pantoprazole    Tablet 40 milliGRAM(s) Oral before breakfast  potassium chloride    Tablet ER 10 milliEquivalent(s) Oral daily  senna 2 Tablet(s) Oral at bedtime    MEDICATIONS  (PRN):  acetaminophen   Tablet. 325 milliGRAM(s) Oral every 4 hours PRN Mild Pain (1 - 3)  oxyCODONE    5 mG/acetaminophen 325 mG 1 Tablet(s) Oral every 4 hours PRN Moderate Pain (4 - 6)      Patient is a 72y old  Male who presents with a chief complaint of sob right upper quadrant pain for 2 weeks (2018 21:19)      Vital Signs Last 24 Hrs  T(C): 36.6 (2018 15:58), Max: 36.9 (2018 04:40)  T(F): 97.9 (2018 15:58), Max: 98.5 (2018 09:03)  HR: 94 (2018 15:58) (94 - 123)  BP: 103/73 (2018 15:58) (103/73 - 117/79)  BP(mean): --  RR: 17 (2018 15:58) (14 - 18)  SpO2: 96% (2018 15:58) (96% - 97%)           @ 07:01  -   @ 07:00  --------------------------------------------------------  IN: 0 mL / OUT: 550 mL / NET: -550 mL        REVIEW OF SYSTEMS:    Constitutional: No fever, weight loss or fatigue  Eyes: No eye pain, visual disturbances, or discharge  ENT:  No difficulty hearing, tinnitus, vertigo; No sinus or throat pain  Neck: No pain or stiffness  Breasts: No pain, masses or nipple discharge  Respiratory: No cough, wheezing, chills or hemoptysis  Cardiovascular: No chest pain, palpitations, shortness of breath, dizziness or leg swelling  Gastrointestinal: No abdominal or epigastric pain. No nausea, vomiting or hematemesis; No diarrhea or constipation. No melena or hematochezia.  Genitourinary: No dysuria, frequency, hematuria or incontinence  Rectal: No pain, hemorrhoids or incontinence  Neurological: No headaches, memory loss, loss of strength, numbness or tremors  Skin: No itching, burning, rashes or lesions   Lymph Nodes: No enlarged glands  Endocrine: No heat or cold intolerance; No hair loss  Musculoskeletal: No joint pain or swelling; No muscle, back or extremity pain  Psychiatric: No depression, anxiety, mood swings or difficulty sleeping  Heme/Lymph: No easy bruising or bleeding gums  Allergy and Immunologic: No hives or eczema    PHYSICAL EXAM:  aox3,,very pleasant  very pleasant  denies any further sob  legs less swollen  Constitutional: NAD  HEENT: PERRLA, EOMI, Normal Hearing,   Neck: No LAD, No JVD  Back: Normal spine flexure, No CVA tenderness  Respiratory: CTAB/L upper   Cardiovascular: irregular  Gastrointestinal: BS+, soft, NT/ND  Extremities:+ptting edema 2    Neurological: A/O x 3, no focal deficits  Skin: No rashes      decubiti: none                          13.8   13.7  )-----------( 182      ( 2018 14:30 )             43.2         140  |  107  |  24<H>  ----------------------------<  103<H>  5.2   |  24  |  1.30    Ca    8.4<L>      2018 14:30    TPro  6.4  /  Alb  2.5<L>  /  TBili  2.6<H>  /  DBili  x   /  AST  54<H>  /  ALT  35  /  AlkPhos  291<H>      PT/INR - ( 2018 14:30 )   PT: 13.0 sec;   INR: 1.19 ratio           Urinalysis Basic - ( 2018 16:51 )    Color: Yellow / Appearance: Clear / S.010 / pH: x  Gluc: x / Ketone: Negative  / Bili: Negative / Urobili: Negative   Blood: x / Protein: Negative / Nitrite: Negative   Leuk Esterase: Small / RBC: Negative /HPF / WBC 6-10   Sq Epi: x / Non Sq Epi: Occasional / Bacteria: Few      CARDIAC MARKERS ( 2018 13:36 )  <.015 ng/mL / x     / x     / x     / x      CARDIAC MARKERS ( 2018 06:07 )  <.015 ng/mL / x     / x     / x     / x      CARDIAC MARKERS ( 2018 21:49 )  <.015 ng/mL / x     / x     / x     / x      CARDIAC MARKERS ( 2018 14:30 )  <.015 ng/mL / x     / 66 U/L / x     / 2.1 ng/mL       @ 13:36    TSH: --  B12:  --   Folate: --   GUALBERTO: --   Rheumatoid: --   Ammonia:  --   CPK:  --  Total PSA:  --  Free PSA: --  Cortisol: --  C-Diff:  --  BNP:  --  SPEP: --  Troponin:  <.015  CKMB: --  Valproic acid level:  --  tegretol level: --  dilantin level:  --  phenobarb level: --  keppra level:  --   @ 07:43    TSH: --  B12:  --   Folate: --   GUALBERTO: --   Rheumatoid: 26   Ammonia:  --   CPK:  --  Total PSA:  --  Free PSA: --  Cortisol: --  C-Diff:  --  BNP:  --  SPEP: --  Troponin:  --  CKMB: --  Valproic acid level:  --  tegretol level: --  dilantin level:  --  phenobarb level: --  keppra level:  --   @ 06:07    TSH: --  B12:  --   Folate: --   GUALBERTO: --   Rheumatoid: --   Ammonia:  --   CPK:  --  Total PSA:  --  Free PSA: --  Cortisol: --  C-Diff:  --  BNP:  3880  SPEP: --  Troponin:  <.015  CKMB: --  Valproic acid level:  --  tegretol level: --  dilantin level:  --  phenobarb level: --  keppra level:  --   @ 21:49    TSH: --  B12:  --   Folate: --   GUALBERTO: --   Rheumatoid: --   Ammonia:  --   CPK:  --  Total PSA:  --  Free PSA: --  Cortisol: --  C-Diff:  --  BNP:  --  SPEP: --  Troponin:  <.015  CKMB: --  Valproic acid level:  --  tegretol level: --  dilantin level:  --  phenobarb level: --  keppra level:  --            Radiology:  < from: US Duplex Venous Lower Ext Complete, Bilateral (18 @ 08:10) >  EXAM:  US DPLX LWR EXT VEINS COMPL BI                            PROCEDURE DATE:  2018          INTERPRETATION:  CLINICAL INFORMATION: Dyspnea    COMPARISON: None available.    TECHNIQUE: Duplex sonography of the BILATERAL LOWER extremities with   color and spectral Doppler, with and without compression.      < from: CT Abdomen and Pelvis w/ Oral Cont and w/ IV Cont (18 @ 18:54) >  EXAM:  CT ABDOMEN AND PELVIS OC IC                          EXAM:  CT CHEST IC                            PROCEDURE DATE:  2018          INTERPRETATION:  CT CHEST/ABDOMEN/PELVIS:     CLINICAL INFORMATION: Shortness of breath, Right upper quadrant abdominal   pain, elevated liver enzymes and jaundice.    COMPARISON: None available.    PROCEDURE:  Using multislice helical CT, following the intravenous   administration of 95 cc of Omnipaque 350, 2.5 mm sections were obtained   from the thoracic inlet through the ischial tuberosities.  Multiplanar reformatted images.      There is a large conglomerate right hilar and mediastinal mass, which   encases and markedly narrows the truncus arteriosus. There is confluent   involvement of the right paratracheal, AP mediastinal and subcarinal   mediastinum, extending to the right azygo esophageal recess. The mass   surrounds and mildly narrows the bronchus intermedius.      < end of copied text >  FINDINGS:    There is normal compressibility of the bilateral common femoral, femoral   and popliteal veins. No calf vein thrombosis is detected.    Doppler examination shows normal spontaneous and phasic flow.    IMPRESSION:     No evidence of bilateral lower extremity deep venous thrombosis.    < end of copied text >  < from: CT Chest w/ IV Cont (18 @ 18:55) >    EXAM:  CT ABDOMEN AND PELVIS OC IC                          EXAM:  CT CHEST IC                            PROCEDURE DATE:  2018          INTERPRETATION:  CT CHEST/ABDOMEN/PELVIS:     CLINICAL INFORMATION: Shortness of breath, Right upper quadrant abdominal   pain, elevated liver enzymes and jaundice.    COMPARISON: None available.    PROCEDURE:  Using multislice helical CT, following the intravenous   administration of 95 cc of Omnipaque 350, 2.5 mm sections were obtained   from the thoracic inlet through the ischial tuberosities.  Multiplanar reformatted images.      There is a large conglomerate right hilar and mediastinal mass, which   encases and markedly narrows the truncus arteriosus. There is confluent   involvement of the right paratracheal, AP mediastinal and subcarinal   mediastinum, extending to the right azygo esophageal recess. The mass   surrounds and mildly narrows the bronchus intermedius.      < end of copied text >

## 2018-04-24 NOTE — PROGRESS NOTE ADULT - SUBJECTIVE AND OBJECTIVE BOX
[facility] Cardiology Progress Note [phone] (Dr. Hutchinson, Damien, Nasima, Jacki)    CHIEF COMPLAINT: Patient is a 72y old  Male who presents with a chief complaint of sob right upper quadrant pain for 2 weeks (23 Apr 2018 21:19)      Follow Up Today: The patient denies any chest discomfort or shortness of breath.    HPI:  patient came to the office for ruq pain  sob for 1 week  saw dr morse friday,lft mildly elevated,was told to be seen  patient has refused all preventative w/u in the past,except labs and bp medications  refused cxr/colonoscopy  when he came in today,i knew there was a problem,,he usually will come only once a year  he had ruq pain,hepatomegally,leg edema 2+  i sent him to the er,,i explained it may be cancer primarily or simpler like acute choly/pancreatitis  girl friend was present (23 Apr 2018 21:19)      PAST MEDICAL & SURGICAL HISTORY:  Hip arthritis: lt  Hypertension  No significant past surgical history      MEDICATIONS  (STANDING):  ALBUTerol/ipratropium for Nebulization 3 milliLiter(s) Nebulizer every 8 hours  aspirin enteric coated 81 milliGRAM(s) Oral daily  enoxaparin Injectable 70 milliGRAM(s) SubCutaneous every 12 hours  furosemide    Tablet 40 milliGRAM(s) Oral daily  metoprolol tartrate 25 milliGRAM(s) Oral every 6 hours  pantoprazole    Tablet 40 milliGRAM(s) Oral before breakfast  potassium chloride    Tablet ER 10 milliEquivalent(s) Oral daily  senna 2 Tablet(s) Oral at bedtime    MEDICATIONS  (PRN):  acetaminophen   Tablet. 325 milliGRAM(s) Oral every 4 hours PRN Mild Pain (1 - 3)  oxyCODONE    5 mG/acetaminophen 325 mG 1 Tablet(s) Oral every 4 hours PRN Moderate Pain (4 - 6)      Allergies    No Known Allergies    Intolerances        REVIEW OF SYSTEMS:    All other review of systems is negative unless indicated above    Vital Signs Last 24 Hrs  T(C): 36.9 (24 Apr 2018 04:40), Max: 36.9 (24 Apr 2018 04:40)  T(F): 98.4 (24 Apr 2018 04:40), Max: 98.4 (24 Apr 2018 04:40)  HR: 99 (24 Apr 2018 04:40) (99 - 147)  BP: 111/75 (24 Apr 2018 04:40) (104/74 - 123/90)  BP(mean): --  RR: 18 (24 Apr 2018 04:40) (14 - 18)  SpO2: 96% (24 Apr 2018 04:40) (96% - 97%)    I&O's Summary    23 Apr 2018 07:01  -  24 Apr 2018 07:00  --------------------------------------------------------  IN: 0 mL / OUT: 550 mL / NET: -550 mL        PHYSICAL EXAM:    Constitutional: NAD, awake and alert, well-developed  Eyes:  EOMI,  Pupils round, No oral cyanosis.  HEENT: No exudate or erythema  Pulmonary: Non-labored, breath sounds are clear bilaterally, No wheezing, rales or rhonchi  Cardiovascular: Regular, S1 and S2, No murmurs, rubs, gallops oir clicks  Gastrointestinal: Bowel Sounds present, soft, nontender.   Ext: No significant LE edema with good pulses x 4  Neurological: Alert, no gross focal motor deficits  Skin: No rashes.  Psych:  Mood & affect appropriate    LABS: All Labs Reviewed:                        13.8   13.7  )-----------( 182      ( 23 Apr 2018 14:30 )             43.2     23 Apr 2018 14:30    140    |  107    |  24     ----------------------------<  103    5.2     |  24     |  1.30     Ca    8.4        23 Apr 2018 14:30    TPro  6.4    /  Alb  2.5    /  TBili  2.6    /  DBili  x      /  AST  54     /  ALT  35     /  AlkPhos  291    23 Apr 2018 14:30    PT/INR - ( 23 Apr 2018 14:30 )   PT: 13.0 sec;   INR: 1.19 ratio           CARDIAC MARKERS ( 24 Apr 2018 06:07 )  <.015 ng/mL / x     / x     / x     / x      CARDIAC MARKERS ( 23 Apr 2018 21:49 )  <.015 ng/mL / x     / x     / x     / x      CARDIAC MARKERS ( 23 Apr 2018 14:30 )  <.015 ng/mL / x     / 66 U/L / x     / 2.1 ng/mL      Blood Culture:   04-24 @ 06:07  Pro Bnp [id number]  04-23 @ 14:30  Pro Bnp [id number]        RADIOLOGY/EKG:    Attending Attestation:   20 minutes spent on total encounter; more than 50% of the visit was spent counseling and/or coordinating care by the attending physician.     ASSESSMENT AND PLAN [facility] Cardiology Progress Note [phone] (Dr. Hutchinson, Damien, Nasima, Jacki)    CHIEF COMPLAINT: Patient is a 72y old  Male who presents with a chief complaint of sob right upper quadrant pain for 2 weeks (23 Apr 2018 21:19)      Follow Up Today: The patient denies any chest discomfort or shortness of breath. He complains that he cant take a deep breath.  Had echo this am.    HPI:  patient came to the office for ruq pain  sob for 1 week  saw dr morse friday,lft mildly elevated,was told to be seen  patient has refused all preventative w/u in the past,except labs and bp medications  refused cxr/colonoscopy  when he came in today,i knew there was a problem,,he usually will come only once a year  he had ruq pain,hepatomegally,leg edema 2+  i sent him to the er,,i explained it may be cancer primarily or simpler like acute choly/pancreatitis  girl friend was present (23 Apr 2018 21:19)      PAST MEDICAL & SURGICAL HISTORY:  Hip arthritis: lt  Hypertension  No significant past surgical history      MEDICATIONS  (STANDING):  ALBUTerol/ipratropium for Nebulization 3 milliLiter(s) Nebulizer every 8 hours  aspirin enteric coated 81 milliGRAM(s) Oral daily  enoxaparin Injectable 70 milliGRAM(s) SubCutaneous every 12 hours  furosemide    Tablet 40 milliGRAM(s) Oral daily  metoprolol tartrate 25 milliGRAM(s) Oral every 6 hours  pantoprazole    Tablet 40 milliGRAM(s) Oral before breakfast  potassium chloride    Tablet ER 10 milliEquivalent(s) Oral daily  senna 2 Tablet(s) Oral at bedtime    MEDICATIONS  (PRN):  acetaminophen   Tablet. 325 milliGRAM(s) Oral every 4 hours PRN Mild Pain (1 - 3)  oxyCODONE    5 mG/acetaminophen 325 mG 1 Tablet(s) Oral every 4 hours PRN Moderate Pain (4 - 6)      Allergies    No Known Allergies    Intolerances        REVIEW OF SYSTEMS:    All other review of systems is negative unless indicated above    Vital Signs Last 24 Hrs  T(C): 36.9 (24 Apr 2018 04:40), Max: 36.9 (24 Apr 2018 04:40)  T(F): 98.4 (24 Apr 2018 04:40), Max: 98.4 (24 Apr 2018 04:40)  HR: 99 (24 Apr 2018 04:40) (99 - 147)  BP: 111/75 (24 Apr 2018 04:40) (104/74 - 123/90)  BP(mean): --  RR: 18 (24 Apr 2018 04:40) (14 - 18)  SpO2: 96% (24 Apr 2018 04:40) (96% - 97%)    I&O's Summary    23 Apr 2018 07:01  -  24 Apr 2018 07:00  --------------------------------------------------------  IN: 0 mL / OUT: 550 mL / NET: -550 mL        PHYSICAL EXAM:    Constitutional: NAD, awake and alert, well-developed  Eyes:  EOMI,  Pupils round, No oral cyanosis.  HEENT: No exudate or erythema  Pulmonary: Non-labored, breath sounds are clear bilaterally, No wheezing, rales or rhonchi  Cardiovascular: irregular S1,S2, soft systolic murmur  Gastrointestinal: Bowel Sounds present, soft, nontender.   Ext: No significant LE edema with good pulses x 4  Neurological: Alert, no gross focal motor deficits  Skin: No rashes.  Psych:  Mood & affect appropriate    LABS: All Labs Reviewed:                        13.8   13.7  )-----------( 182      ( 23 Apr 2018 14:30 )             43.2     23 Apr 2018 14:30    140    |  107    |  24     ----------------------------<  103    5.2     |  24     |  1.30     Ca    8.4        23 Apr 2018 14:30    TPro  6.4    /  Alb  2.5    /  TBili  2.6    /  DBili  x      /  AST  54     /  ALT  35     /  AlkPhos  291    23 Apr 2018 14:30    PT/INR - ( 23 Apr 2018 14:30 )   PT: 13.0 sec;   INR: 1.19 ratio           CARDIAC MARKERS ( 24 Apr 2018 06:07 )  <.015 ng/mL / x     / x     / x     / x      CARDIAC MARKERS ( 23 Apr 2018 21:49 )  <.015 ng/mL / x     / x     / x     / x      CARDIAC MARKERS ( 23 Apr 2018 14:30 )  <.015 ng/mL / x     / 66 U/L / x     / 2.1 ng/mL      Blood Culture:   04-24 @ 06:07  Pro Bnp [id number]  04-23 @ 14:30  Pro Bnp [id number]        RADIOLOGY/EKG:    Attending Attestation:   20 minutes spent on total encounter; more than 50% of the visit was spent counseling and/or coordinating care by the attending physician.     ASSESSMENT AND PLAN

## 2018-04-24 NOTE — CONSULT NOTE ADULT - SUBJECTIVE AND OBJECTIVE BOX
Chief Complaint:  Patient is a 72y old  Male who presents with a chief complaint of sob right upper quadrant pain for 2 weeks (2018 21:19)    CHF (congestive heart failure)  Hip arthritis  Hypertension  No significant past surgical history     HPI:  patient came to the office for ruq pain  sob for 1 week  saw dr morse friday,lft mildly elevated,was told to be seen  patient has refused all preventative w/u in the past,except labs and bp medications  refused cxr/colonoscopy  when he came in today,i knew there was a problem,,he usually will come only once a year  he had ruq pain,hepatomegally,leg edema 2+  i sent him to the er,,i explained it may be cancer primarily or simpler like acute choly/pancreatitis  girl friend was present (2018 21:19)      No Known Allergies      acetaminophen   Tablet. 325 milliGRAM(s) Oral every 4 hours PRN  ALBUTerol/ipratropium for Nebulization 3 milliLiter(s) Nebulizer every 8 hours  enoxaparin Injectable 70 milliGRAM(s) SubCutaneous every 12 hours  furosemide    Tablet 40 milliGRAM(s) Oral daily  metoprolol tartrate 50 milliGRAM(s) Oral three times a day  oxyCODONE    5 mG/acetaminophen 325 mG 1 Tablet(s) Oral every 4 hours PRN  pantoprazole    Tablet 40 milliGRAM(s) Oral before breakfast  potassium chloride    Tablet ER 10 milliEquivalent(s) Oral daily  senna 2 Tablet(s) Oral at bedtime        FAMILY HISTORY:        Review of Systems:    General:  No wt loss, fevers, chills, night sweats,fatigue,   Eyes:  Good vision, no reported pain  ENT:  No sore throat, pain, runny nose, dysphagia  CV:  No pain, palpitatioins, hypo/hypertension  Resp:  No dyspnea, cough, tachypnea, wheezing  :  No pain, bleeding, incontinence, nocturia  Muscle:  No pain, weakness  Neuro:  No weakness, tingling, memory problems  Psych:  No fatigue, insomnia, mood problems, depression  Endocrine:  No polyuria, polydypsia, cold/heat intolerance  Heme:  No petechiae, ecchymosis, easy bruisability  Skin:  No rash, tattoos, scars, edema    Relevant Family History:       Relevant Social History:       Physical Exam:    Vital Signs:  Vital Signs Last 24 Hrs  T(C): 36.9 (2018 09:03), Max: 36.9 (2018 04:40)  T(F): 98.5 (2018 09:03), Max: 98.5 (2018 09:03)  HR: 123 (2018 09:03) (99 - 147)  BP: 117/79 (2018 09:03) (104/74 - 123/90)  BP(mean): --  RR: 18 (2018 09:03) (14 - 18)  SpO2: 97% (2018 09:03) (96% - 97%)  Daily     Daily Weight in k.4 (2018 04:40)    General:  Appears stated age, well-groomed, well-nourished, no distress  HEENT:  NC/AT,  conjunctivae clear and pink, no thyromegaly, nodules, adenopathy, no JVD  Chest:  Full & symmetric excursion, no increased effort, breath sounds clear  Cardiovascular:  Regular rhythm, S1, S2, no murmur/rub/S3/S4, no abdominal bruit, no edema  Abdomen:  Soft, non-tender, non-distended, normoactive bowel sounds,  no masses ,no hepatosplenomeagaly, no signs of chronic liver disease  Extremities:  no cyanosis,clubbing or edema  Skin:  No rash/erythema/ecchymoses/petechiae/wounds/abscess/warm/dry  Neuro/Psych:  Alert, oriented, no asterixis, no tremor, no encephalopathy    Laboratory:                            13.8   13.7  )-----------( 182      ( 2018 14:30 )             43.2         140  |  107  |  24<H>  ----------------------------<  103<H>  5.2   |  24  |  1.30    Ca    8.4<L>      2018 14:30    TPro  6.4  /  Alb  2.5<L>  /  TBili  2.6<H>  /  DBili  x   /  AST  54<H>  /  ALT  35  /  AlkPhos  291<H>  04-23    LIVER FUNCTIONS - ( 2018 14:30 )  Alb: 2.5 g/dL / Pro: 6.4 g/dL / ALK PHOS: 291 U/L / ALT: 35 U/L / AST: 54 U/L / GGT: x           PT/INR - ( 2018 14:30 )   PT: 13.0 sec;   INR: 1.19 ratio           Urinalysis Basic - ( 2018 16:51 )    Color: Yellow / Appearance: Clear / S.010 / pH: x  Gluc: x / Ketone: Negative  / Bili: Negative / Urobili: Negative   Blood: x / Protein: Negative / Nitrite: Negative   Leuk Esterase: Small / RBC: Negative /HPF / WBC 6-10   Sq Epi: x / Non Sq Epi: Occasional / Bacteria: Few      Amylase Serum63      Lipase iveos164       Ammonia--    Imaging:

## 2018-04-24 NOTE — PROGRESS NOTE ADULT - SUBJECTIVE AND OBJECTIVE BOX
VITALS/LABS  4/24/2018 afeb 94 103/73 17 96%   4/23/2018 W 13.7 Hb 13.8 Plt 182 INR 119Na 140 K 5.2 CO2 24 Cr 1.3     REVIEW OF SYMPTOMS    Able to give ROS  Yes     RELIABLE No   CONSTITUTIONAL Weakness Yes  Chills No Vision changes No  ENDOCRINE No unexplained hair loss No heat or cold intolerance    ALLERGY No hives  Sore throat No   RESP Coughing blood no  Shortness of breath YES   NEURO No Headache  Confusion Pain neck No   CARDIAC No Chest pain No Palpitations   GI No Pain abdomen NO   Vomiting NO     PHYSICAL EXAM    HEENT Unremarkable PERRLA atraumatic   RESP Fair air entry EXP prolonged    Harsh breath sound Resp distres mild   CARDIAC S1 S2 No S3     NO JVD    ABDOMEN SOFT BS PRESENT NOT DISTENDED No hepatosplenomegaly PEDAL EDEMA present No calf tenderness  NO rash   GENERAL Not TOXIC looking    GLOBAL ISSUE/BEST PRACTICE:        PROBLEM: Analgesia:     na                        PROBLEM: Sedation:     na               PROBLEM: HOB elevation:   y            PROBLEM: Stress ulcer proph:    na                      PROBLEM: VTE prophylaxis:      Lvnx 70.2 (4/23)   PROBLEM: Glycemic control:    na  PROBLEM: Nutrition:    reg diet (4/23)   PROBLEM: Advanced directive: na     PROBLEM: Allergies:  na    BRIEF PATIENT DESCRIPTION ADMISSION  72 male former smoker PMH CHF Hip arthritis Htn sent to ER for evaluation of elevated bilirubin and liver enzymes found during preop testing for hip replacement . Patient states for the last month he has had lower extremity edema, for the past week he has had exertional dyspnea and for the past 2 days he has had right upper quadrant discomfort.  BACKGROUND 4/23/2018 Pt is fully active puts up fences till upto a few d pta Former 2 ppd smokermoderate ETOH use PMH Rheumatoid arthritis sees Dr Dickey  HOME MEDS atenolol 50 lasix 20 losartan 25 tramadol 50.2   ER Vitals 4/23/2018 afeb 140 120/90 72        PROBLEM BASES ASSESSMENT PLAN 4/23/2018 ADMISSION [facility] P   72 m with hilomediastinal parenchymal lung and liver masses with ascites admitted 4/23 with dyspnea  Likely has metaastatic hcc Prognosis grave   DYSPNEA   4/24 RA 96%   4/23 Check echo ro chf check v duplex legs ro dvt   4/23 O2 Monitor po  4/24 V duplex legs n   COPD  Duoneb.3 (4/23)     AF RVR   4/24 Cardio plans maximizing bb and if hemodynamic instability then DCCV   4/24 Pt on ac for pvt   RO MI   4/23 Tr 1 n.2   CHF  4/24/2018 ECHO ef 50% Mod mr rvsp 20 large bl effsns   Lasix 40 (4/23)   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 Risks, benefits, alternatives  discussed in detail. Proper antibiotic use issues addressed Questions/concerns  were addressed  as  best as possible As applicable to this patient the following issues were addressed Pain was  assessed and addresed.Pt was screened for signs of clinical depression .Appropriate referral made.Risk of falls assessed.Fall prevention d/w family .Pt was screened for tobacco and etoh,counseling was done for etoh and tobacco cessation as applicable .Use of narcotic pain meds was discussed as applicable including  to use narcotic meds  wisely and to avoid overusing them

## 2018-04-25 LAB
% ALBUMIN: 47.5 % — SIGNIFICANT CHANGE UP
% ALPHA 1: 8.9 % — SIGNIFICANT CHANGE UP
% ALPHA 2: 14.2 % — SIGNIFICANT CHANGE UP
% BETA: 9.8 % — SIGNIFICANT CHANGE UP
% GAMMA: 19.6 % — SIGNIFICANT CHANGE UP
A1AT SERPL-MCNC: 247 MG/DL — HIGH (ref 90–200)
ALBUMIN SERPL ELPH-MCNC: 2.4 G/DL — LOW (ref 3.3–5)
ALBUMIN SERPL ELPH-MCNC: 2.8 G/DL — LOW (ref 3.6–5.5)
ALBUMIN/GLOB SERPL ELPH: 0.9 RATIO — SIGNIFICANT CHANGE UP
ALP SERPL-CCNC: 289 U/L — HIGH (ref 40–120)
ALPHA1 GLOB SERPL ELPH-MCNC: 0.5 G/DL — HIGH (ref 0.1–0.4)
ALPHA2 GLOB SERPL ELPH-MCNC: 0.8 G/DL — SIGNIFICANT CHANGE UP (ref 0.5–1)
ALT FLD-CCNC: 34 U/L — SIGNIFICANT CHANGE UP (ref 12–78)
ANION GAP SERPL CALC-SCNC: 10 MMOL/L — SIGNIFICANT CHANGE UP (ref 5–17)
AST SERPL-CCNC: 60 U/L — HIGH (ref 15–37)
B PERT IGG+IGM PNL SER: CLEAR — SIGNIFICANT CHANGE UP
B-GLOBULIN SERPL ELPH-MCNC: 0.6 G/DL — SIGNIFICANT CHANGE UP (ref 0.5–1)
BASOPHILS # BLD AUTO: 0.1 K/UL — SIGNIFICANT CHANGE UP (ref 0–0.2)
BASOPHILS NFR BLD AUTO: 0.5 % — SIGNIFICANT CHANGE UP (ref 0–2)
BILIRUB SERPL-MCNC: 2.3 MG/DL — HIGH (ref 0.2–1.2)
BUN SERPL-MCNC: 21 MG/DL — SIGNIFICANT CHANGE UP (ref 7–23)
CALCIUM SERPL-MCNC: 8.3 MG/DL — LOW (ref 8.5–10.1)
CERULOPLASMIN SERPL-MCNC: 42 MG/DL — SIGNIFICANT CHANGE UP (ref 20–60)
CHLORIDE SERPL-SCNC: 103 MMOL/L — SIGNIFICANT CHANGE UP (ref 96–108)
CO2 SERPL-SCNC: 27 MMOL/L — SIGNIFICANT CHANGE UP (ref 22–31)
COLOR FLD: YELLOW — SIGNIFICANT CHANGE UP
CREAT SERPL-MCNC: 1.2 MG/DL — SIGNIFICANT CHANGE UP (ref 0.5–1.3)
EOSINOPHIL # BLD AUTO: 0.2 K/UL — SIGNIFICANT CHANGE UP (ref 0–0.5)
EOSINOPHIL NFR BLD AUTO: 1.6 % — SIGNIFICANT CHANGE UP (ref 0–6)
FLUID INTAKE SUBSTANCE CLASS: SIGNIFICANT CHANGE UP
FLUID SEGMENTED GRANULOCYTES: 10 % — SIGNIFICANT CHANGE UP
GAMMA GLOBULIN: 1.2 G/DL — SIGNIFICANT CHANGE UP (ref 0.6–1.6)
GLUCOSE SERPL-MCNC: 91 MG/DL — SIGNIFICANT CHANGE UP (ref 70–99)
HCT VFR BLD CALC: 41 % — SIGNIFICANT CHANGE UP (ref 39–50)
HCV RNA FLD QL NAA+PROBE: SIGNIFICANT CHANGE UP
HCV RNA SERPL NAA DL=5-ACNC: HIGH IU/ML
HCV RNA SPEC NAA+PROBE-LOG IU: 5.79 LOGIU/ML — HIGH
HCV RNA SPEC QL PROBE+SIG AMP: DETECTED
HGB BLD-MCNC: 13.3 G/DL — SIGNIFICANT CHANGE UP (ref 13–17)
INTERPRETATION SERPL IFE-IMP: SIGNIFICANT CHANGE UP
IRON SATN MFR SERPL: 23 % — SIGNIFICANT CHANGE UP (ref 16–55)
IRON SATN MFR SERPL: 48 UG/DL — SIGNIFICANT CHANGE UP (ref 45–165)
LYMPHOCYTES # BLD AUTO: 1.5 K/UL — SIGNIFICANT CHANGE UP (ref 1–3.3)
LYMPHOCYTES # BLD AUTO: 13 % — SIGNIFICANT CHANGE UP (ref 13–44)
LYMPHOCYTES # FLD: 22 % — SIGNIFICANT CHANGE UP
MCHC RBC-ENTMCNC: 29.5 PG — SIGNIFICANT CHANGE UP (ref 27–34)
MCHC RBC-ENTMCNC: 32.4 GM/DL — SIGNIFICANT CHANGE UP (ref 32–36)
MCV RBC AUTO: 91.2 FL — SIGNIFICANT CHANGE UP (ref 80–100)
MONOCYTES # BLD AUTO: 0.7 K/UL — SIGNIFICANT CHANGE UP (ref 0–0.9)
MONOCYTES NFR BLD AUTO: 6.1 % — SIGNIFICANT CHANGE UP (ref 1–9)
MONOS+MACROS # FLD: 68 % — SIGNIFICANT CHANGE UP
NEUTROPHILS # BLD AUTO: 8.9 K/UL — HIGH (ref 1.8–7.4)
NEUTROPHILS NFR BLD AUTO: 78.7 % — HIGH (ref 43–77)
PLATELET # BLD AUTO: 152 K/UL — SIGNIFICANT CHANGE UP (ref 150–400)
POTASSIUM SERPL-MCNC: 4 MMOL/L — SIGNIFICANT CHANGE UP (ref 3.5–5.3)
POTASSIUM SERPL-SCNC: 4 MMOL/L — SIGNIFICANT CHANGE UP (ref 3.5–5.3)
PROT PATTERN SERPL ELPH-IMP: SIGNIFICANT CHANGE UP
PROT SERPL-MCNC: 5.9 G/DL — LOW (ref 6–8.3)
PROT SERPL-MCNC: 5.9 G/DL — LOW (ref 6–8.3)
RBC # BLD: 4.49 M/UL — SIGNIFICANT CHANGE UP (ref 4.2–5.8)
RBC # FLD: 15.3 % — HIGH (ref 10.3–14.5)
RCV VOL RI: 10 /UL — HIGH (ref 0–5)
SODIUM SERPL-SCNC: 140 MMOL/L — SIGNIFICANT CHANGE UP (ref 135–145)
TIBC SERPL-MCNC: 213 UG/DL — LOW (ref 220–430)
TOTAL NUCLEATED CELL COUNT, BODY FLUID: 126 /UL — HIGH (ref 0–5)
TUBE TYPE: SIGNIFICANT CHANGE UP
UIBC SERPL-MCNC: 165 UG/DL — SIGNIFICANT CHANGE UP (ref 110–370)
WBC # BLD: 11.3 K/UL — HIGH (ref 3.8–10.5)
WBC # FLD AUTO: 11.3 K/UL — HIGH (ref 3.8–10.5)

## 2018-04-25 PROCEDURE — 88305 TISSUE EXAM BY PATHOLOGIST: CPT | Mod: 26

## 2018-04-25 PROCEDURE — 88108 CYTOPATH CONCENTRATE TECH: CPT | Mod: 26

## 2018-04-25 PROCEDURE — 47000 NEEDLE BIOPSY OF LIVER PERQ: CPT

## 2018-04-25 PROCEDURE — 49083 ABD PARACENTESIS W/IMAGING: CPT

## 2018-04-25 PROCEDURE — 88307 TISSUE EXAM BY PATHOLOGIST: CPT | Mod: 26

## 2018-04-25 PROCEDURE — 88312 SPECIAL STAINS GROUP 1: CPT | Mod: 26

## 2018-04-25 PROCEDURE — 88313 SPECIAL STAINS GROUP 2: CPT | Mod: 26

## 2018-04-25 PROCEDURE — 76942 ECHO GUIDE FOR BIOPSY: CPT | Mod: 26,59

## 2018-04-25 RX ORDER — ALBUMIN HUMAN 25 %
50 VIAL (ML) INTRAVENOUS ONCE
Qty: 0 | Refills: 0 | Status: DISCONTINUED | OUTPATIENT
Start: 2018-04-25 | End: 2018-04-28

## 2018-04-25 RX ORDER — METOPROLOL TARTRATE 50 MG
50 TABLET ORAL
Qty: 0 | Refills: 0 | Status: DISCONTINUED | OUTPATIENT
Start: 2018-04-25 | End: 2018-04-25

## 2018-04-25 RX ORDER — METOPROLOL TARTRATE 50 MG
50 TABLET ORAL EVERY 6 HOURS
Qty: 0 | Refills: 0 | Status: DISCONTINUED | OUTPATIENT
Start: 2018-04-25 | End: 2018-04-26

## 2018-04-25 RX ADMIN — Medication 50 MILLIGRAM(S): at 13:33

## 2018-04-25 RX ADMIN — Medication 40 MILLIGRAM(S): at 05:26

## 2018-04-25 RX ADMIN — Medication 50 MILLIGRAM(S): at 05:26

## 2018-04-25 RX ADMIN — Medication 10 MILLIEQUIVALENT(S): at 13:33

## 2018-04-25 RX ADMIN — Medication 50 MILLIGRAM(S): at 23:11

## 2018-04-25 RX ADMIN — Medication 50 MILLIGRAM(S): at 17:51

## 2018-04-25 NOTE — PROGRESS NOTE ADULT - SUBJECTIVE AND OBJECTIVE BOX
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  No RUQ pain today.      HPI:  patient came to the office for ruq pain  sob for 1 week  saw dr morse friday,lft mildly elevated,was told to be seen  patient has refused all preventative w/u in the past,except labs and bp medications  refused cxr/colonoscopy  when he came in today,i knew there was a problem,,he usually will come only once a year  he had ruq pain,hepatomegally,leg edema 2+  i sent him to the er,,i explained it may be cancer primarily or simpler like acute choly/pancreatitis  girl friend was present (23 Apr 2018 21:19)      REVIEW OF SYSTEMS:    CONSTITUTIONAL: +fatigue  NECK: No pain or stiffness  RESPIRATORY: No shortness of breath, slight GOODWIN  CARDIOVASCULAR: No chest pain, palpitations, dizziness,  chronic leg swelling x 1 year  GASTROINTESTINAL: +RUQ/RL costal pain.  No melena or hematochezia.  GENITOURINARY: No dysuria, frequency, hematuria, or incontinence  NEUROLOGICAL: No headaches, memory loss, loss of strength, numbness, or tremors  MUSCULOSKELETAL:  + hip pain.  PSYCHIATRIC: No depression, anxiety, mood swings, or difficulty sleeping    MEDICATIONS  (STANDING):  ALBUTerol/ipratropium for Nebulization 3 milliLiter(s) Nebulizer every 8 hours  furosemide    Tablet 40 milliGRAM(s) Oral daily  metoprolol tartrate 50 milliGRAM(s) Oral three times a day  pantoprazole    Tablet 40 milliGRAM(s) Oral before breakfast  potassium chloride    Tablet ER 10 milliEquivalent(s) Oral daily  senna 2 Tablet(s) Oral at bedtime    MEDICATIONS  (PRN):  acetaminophen   Tablet. 325 milliGRAM(s) Oral every 4 hours PRN Mild Pain (1 - 3)  albumin human 25% IVPB 50 milliLiter(s) IV Intermittent once PRN see below  oxyCODONE    5 mG/acetaminophen 325 mG 1 Tablet(s) Oral every 4 hours PRN Moderate Pain (4 - 6)      ALLERGIES: No Known Allergies      FAMILY HISTORY:      PHYSICAL EXAMINATION:  -----------------------------  T(C): 36.6 (04-25-18 @ 07:54), Max: 36.9 (04-24-18 @ 09:03)  HR: 110 (04-25-18 @ 07:54) (62 - 123)  BP: 102/67 (04-25-18 @ 07:54) (99/60 - 117/79)  RR: 17 (04-25-18 @ 07:54) (17 - 18)  SpO2: 97% (04-25-18 @ 07:54) (94% - 97%)  Wt(kg): --    04-24 @ 07:01  -  04-25 @ 07:00  --------------------------------------------------------  IN:    Oral Fluid: 890 mL  Total IN: 890 mL    OUT:    Voided: 550 mL  Total OUT: 550 mL    Total NET: 340 mL      PHYSICAL EXAM:    Constitutional: NAD, awake and alert, well-developed  Eyes:  EOMI,  Pupils round, No oral cyanosis.  HEENT: No exudate or erythema  Pulmonary: Non-labored, breath sounds are clear bilaterally, No wheezing, rales or rhonchi  Cardiovascular: irregular S1,S2, soft systolic murmur  Gastrointestinal: Bowel Sounds present, softly distended, nontender.   Ext:  B/L 1-2+ edema  Neurological: Alert, no gross focal motor deficits  Skin: No rashes.  Psych:  Mood & affect appropriate        LABS:   --------  04-25    140  |  103  |  21  ----------------------------<  91  4.0   |  27  |  1.20    Ca    8.3<L>      25 Apr 2018 06:04    TPro  5.9<L>  /  Alb  2.4<L>  /  TBili  2.3<H>  /  DBili  x   /  AST  60<H>  /  ALT  34  /  AlkPhos  289<H>  04-25                         13.3   11.3  )-----------( 152      ( 25 Apr 2018 06:04 )             41.0     PT/INR - ( 23 Apr 2018 14:30 )   PT: 13.0 sec;   INR: 1.19 ratio             04-24 @ 13:36 CPK total:--, CKMB --, Troponin I - <.015 ng/mL  04-24 @ 06:07 CPK total:--, CKMB --, Troponin I - <.015 ng/mL  04-23 @ 21:49 CPK total:--, CKMB --, Troponin I - <.015 ng/mL  04-23 @ 14:30 CPK total:--, CKMB --, Troponin I - <.015 ng/mL

## 2018-04-25 NOTE — CONSULT NOTE ADULT - ASSESSMENT
73 y/o man presented with abdominal pain, SOB and was found to have7 cm  right hepatic mass with extensive tumoral thrombosis, ascites, spiculated RUL mass with confluent right hilar and mediastinal lymphadenopathy.      Findings are consistent with advanced malignancy  May be 2 primaries ( pulmonary and hepatic origin)     Pt is scheduled  for CT guided  paracentesis and liver Bx today   CEA 10, AFP 51  hepC +, splenomegaly     will f/u   further recommendations pending above 71 y/o man presented with abdominal pain, SOB and was found to have7 cm  right hepatic mass with extensive tumoral thrombosis, ascites, spiculated RUL mass with confluent right hilar and mediastinal lymphadenopathy.      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

## 2018-04-25 NOTE — PROGRESS NOTE ADULT - SUBJECTIVE AND OBJECTIVE BOX
PAST MEDICAL & SURGICAL HISTORY:  Hip arthritis: lt  Hypertension  No significant past surgical history      MEDICATIONS  (STANDING):  ALBUTerol/ipratropium for Nebulization 3 milliLiter(s) Nebulizer every 8 hours  furosemide    Tablet 40 milliGRAM(s) Oral daily  metoprolol tartrate 50 milliGRAM(s) Oral every 6 hours  pantoprazole    Tablet 40 milliGRAM(s) Oral before breakfast  potassium chloride    Tablet ER 10 milliEquivalent(s) Oral daily  senna 2 Tablet(s) Oral at bedtime    MEDICATIONS  (PRN):  acetaminophen   Tablet. 325 milliGRAM(s) Oral every 4 hours PRN Mild Pain (1 - 3)  albumin human 25% IVPB 50 milliLiter(s) IV Intermittent once PRN see below  oxyCODONE    5 mG/acetaminophen 325 mG 1 Tablet(s) Oral every 4 hours PRN Moderate Pain (4 - 6)      Patient is a 72y old  Male who presents with a chief complaint of sob right upper quadrant pain for 2 weeks (23 Apr 2018 21:19)      Vital Signs Last 24 Hrs  T(C): 36.8 (25 Apr 2018 16:26), Max: 36.8 (24 Apr 2018 20:45)  T(F): 98.3 (25 Apr 2018 16:26), Max: 98.3 (25 Apr 2018 16:26)  HR: 93 (25 Apr 2018 16:26) (62 - 115)  BP: 98/66 (25 Apr 2018 16:26) (98/66 - 115/73)  BP(mean): --  RR: 17 (25 Apr 2018 16:26) (16 - 18)  SpO2: 96% (25 Apr 2018 16:26) (94% - 98%)          04-24 @ 07:01 - 04-25 @ 07:00  --------------------------------------------------------  IN: 890 mL / OUT: 550 mL / NET: 340 mL    04-25 @ 07:01 - 04-25 @ 17:25  --------------------------------------------------------  IN: 0 mL / OUT: 100 mL / NET: -100 mL        REVIEW OF SYSTEMS:    Constitutional: No fever, weight loss or fatigue  Eyes: No eye pain, visual disturbances, or discharge  ENT:  No difficulty hearing, tinnitus, vertigo; No sinus or throat pain  Neck: No pain or stiffness  Breasts: No pain, masses or nipple discharge  Respiratory: No cough, wheezing, chills or hemoptysis  Cardiovascular: No chest pain, palpitations, shortness of breath, dizziness or leg swelling  Gastrointestinal: No abdominal or epigastric pain. No nausea, vomiting or hematemesis; No diarrhea or constipation. No melena or hematochezia.  Genitourinary: No dysuria, frequency, hematuria or incontinence  Rectal: No pain, hemorrhoids or incontinence  Neurological: No headaches, memory loss, loss of strength, numbness or tremors  Skin: No itching, burning, rashes or lesions   Lymph Nodes: No enlarged glands  Endocrine: No heat or cold intolerance; No hair loss  Musculoskeletal: No joint pain or swelling; No muscle, back or extremity pain  Psychiatric: No depression, anxiety, mood swings or difficulty sleeping  Heme/Lymph: No easy bruising or bleeding gums  Allergy and Immunologic: No hives or eczema    PHYSICAL EXAM:  alert  states breathing much better  pain abdomen better  Constitutional: NAD,  HEENT: PERRLA, EOMI, Normal Hearing,   Neck: No LAD, No JVD  Back: Normal spine flexure, No CVA tenderness  Respiratory: CTAB/L upper lobes   Cardiovascular: S1 and S2, RRR, no M/G/R  Gastrointestinal: BS+, soft, NT/ND  Extremities:pitting edema +1    Neurological: A/O x 3, no focal deficits  Skin: No rashes      decubiti: none                          13.3   11.3  )-----------( 152      ( 25 Apr 2018 06:04 )             41.0     04-25    140  |  103  |  21  ----------------------------<  91  4.0   |  27  |  1.20    Ca    8.3<L>      25 Apr 2018 06:04    TPro  5.9<L>  /  Alb  2.4<L>  /  TBili  2.3<H>  /  DBili  x   /  AST  60<H>  /  ALT  34  /  AlkPhos  289<H>  04-25        CARDIAC MARKERS ( 24 Apr 2018 13:36 )  <.015 ng/mL / x     / x     / x     / x      CARDIAC MARKERS ( 24 Apr 2018 06:07 )  <.015 ng/mL / x     / x     / x     / x      CARDIAC MARKERS ( 23 Apr 2018 21:49 )  <.015 ng/mL / x     / x     / x     / x                    Radiology:

## 2018-04-25 NOTE — PROGRESS NOTE ADULT - SUBJECTIVE AND OBJECTIVE BOX
INTERVAL HPI/OVERNIGHT EVENTS:  pt seen and examined  denies complaints, no abd pain/vomiting/loose bms, s/s gib    MEDICATIONS  (STANDING):  ALBUTerol/ipratropium for Nebulization 3 milliLiter(s) Nebulizer every 8 hours  furosemide    Tablet 40 milliGRAM(s) Oral daily  metoprolol tartrate 50 milliGRAM(s) Oral three times a day  pantoprazole    Tablet 40 milliGRAM(s) Oral before breakfast  potassium chloride    Tablet ER 10 milliEquivalent(s) Oral daily  senna 2 Tablet(s) Oral at bedtime    MEDICATIONS  (PRN):  acetaminophen   Tablet. 325 milliGRAM(s) Oral every 4 hours PRN Mild Pain (1 - 3)  oxyCODONE    5 mG/acetaminophen 325 mG 1 Tablet(s) Oral every 4 hours PRN Moderate Pain (4 - 6)      Allergies    No Known Allergies    Intolerances        Review of Systems:    General:  No wt loss, fevers, chills, night sweats, fatigue   Eyes:  Good vision, no reported pain  ENT:  No sore throat, pain, runny nose, dysphagia  CV:  No pain, palpitations, hypo/hypertension  Resp:  No dyspnea, cough, tachypnea, wheezing  GI:  No pain, No nausea, No vomiting, No diarrhea, No constipation, No weight loss, No fever, No pruritis, No rectal bleeding, No melena, No dysphagia  :  No pain, bleeding, incontinence, nocturia  Muscle:  No pain, weakness  Neuro:  No weakness, tingling, memory problems  Psych:  No fatigue, insomnia, mood problems, depression  Endocrine:  No polyuria, polydypsia, cold/heat intolerance  Heme:  No petechiae, ecchymosis, easy bruisability  Skin:  No rash, tattoos, scars, edema      Vital Signs Last 24 Hrs  T(C): 36.6 (2018 07:54), Max: 36.9 (2018 09:03)  T(F): 97.8 (2018 07:54), Max: 98.5 (2018 09:03)  HR: 110 (2018 07:54) (62 - 123)  BP: 102/67 (2018 07:54) (99/60 - 117/79)  BP(mean): --  RR: 17 (2018 07:54) (17 - 18)  SpO2: 97% (2018 07:54) (94% - 97%)    PHYSICAL EXAM:    Constitutional: NAD, sitting upright in bed  HEENT: EOMI, +scleral icterus  Neck: No LAD, supple  Respiratory: CTA   Cardiovascular: S1 and S2, irreg  Gastrointestinal: BS+, soft, NT +dt  Extremities: +mild le edema  Vascular: 2+ peripheral pulses  Neurological: A/O x 3  Skin: +jaundice      LABS:                        13.3   11.3  )-----------( 152      ( 2018 06:04 )             41.0         140  |  103  |  21  ----------------------------<  91  4.0   |  27  |  1.20    Ca    8.3<L>      2018 06:04    TPro  5.9<L>  /  Alb  2.4<L>  /  TBili  2.3<H>  /  DBili  x   /  AST  60<H>  /  ALT  34  /  AlkPhos  289<H>      PT/INR - ( 2018 14:30 )   PT: 13.0 sec;   INR: 1.19 ratio           Urinalysis Basic - ( 2018 16:51 )    Color: Yellow / Appearance: Clear / S.010 / pH: x  Gluc: x / Ketone: Negative  / Bili: Negative / Urobili: Negative   Blood: x / Protein: Negative / Nitrite: Negative   Leuk Esterase: Small / RBC: Negative /HPF / WBC 6-10   Sq Epi: x / Non Sq Epi: Occasional / Bacteria: Few        RADIOLOGY & ADDITIONAL TESTS:

## 2018-04-25 NOTE — PROGRESS NOTE ADULT - SUBJECTIVE AND OBJECTIVE BOX
VITALS/LABS  4/25/2018 4/25/2018 W 11.3 Hb 13.3 Plt 152 Na 140 K 4 CO2 27 cR 1.2     REVIEW OF SYMPTOMS    Able to give ROS  Yes     RELIABLE No   CONSTITUTIONAL Weakness Yes  Chills No Vision changes No  ENDOCRINE No unexplained hair loss No heat or cold intolerance    ALLERGY No hives  Sore throat No   RESP Coughing blood no  Shortness of breath YES   NEURO No Headache  Confusion Pain neck No   CARDIAC No Chest pain No Palpitations   GI No Pain abdomen NO   Vomiting NO     PHYSICAL EXAM    HEENT Unremarkable PERRLA atraumatic   RESP Fair air entry EXP prolonged    Harsh breath sound Resp distres mild   CARDIAC S1 S2 No S3     NO JVD    ABDOMEN SOFT BS PRESENT NOT DISTENDED No hepatosplenomegaly PEDAL EDEMA present No calf tenderness  NO rash   GENERAL Not TOXIC looking    GLOBAL ISSUE/BEST PRACTICE:        PROBLEM: Analgesia:     na                        PROBLEM: Sedation:     na               PROBLEM: HOB elevation:   y            PROBLEM: Stress ulcer proph:    na                      PROBLEM: VTE prophylaxis:      Lvnx 70.2 (4/23)   PROBLEM: Glycemic control:    na  PROBLEM: Nutrition:    reg diet (4/23)   PROBLEM: Advanced directive: na     PROBLEM: Allergies:  na      PROBLEM BASES ASSESSMENT PLAN 4/23/2018 ADMISSION [facility]   72 m with hilomediastinal parenchymal lung and liver masses with ascites admitted 4/23 with dyspnea  Likely has metaastatic hcc Prognosis grave   DYSPNEA   4/24 RA 96%   4/23 Check echo ro chf check v duplex legs ro dvt   4/23 O2 Monitor po  4/24 V duplex legs n   COPD  Duoneb.3 (4/23)     AF RVR   4/24 Cardio plans maximizing bb and if hemodynamic instability then DCCV   4/24 Pt on ac for pvt   RO MI   4/23 Tr 1 n.2   Metoprolol 50.4 (4/25)   CHF  4/24/2018 ECHO ef 50% Mod mr rvsp 20 large bl effsns   Lasix 40 (4/23)   HILOMEDIASTINAL PARENCHYMAL LUNG MASS AND LIVER MASS WITH ASCITES  4/25 Recd call from [facility] that they need lung bx to determine wheher pt has second primary Willdw Dr Dodge Bronch would  be dewayne risky in setting of cardiac problems   4/25 Pt had liver bx as well as paracentesis Results pending   4/23 cea 10.6   4/23 afp 51.5   4/23 Suggest paracentesis or liver biopsy before pulmonary invasive testing  4/24 case dw Dr Dodge Plan is for IR to do nb of liver and paracentesis     PORTAL VEIN THROMBOSIS   Lvnx 70.2 (4/23)   HEP C   4/23 Hep C ab pos     TIME SPENT Over 25 minutes aggregate care time spent on encounter; activities included   direct patient care, counseling and/or coordinating care reviewing notes, lab data/ imaging , discussion with multidisciplinary team/ patient  /family. Risks, benefits, alternatives  discussed in detail. Proper antibiotic use issues addressed Questions/concerns  were addressed  as  best as possible As applicable to this patient the following issues were addressed Pain was  assessed and addresed.Pt was screened for signs of clinical depression .Appropriate referral made.Risk of falls assessed.Fall prevention d/w family .Pt was screened for tobacco and etoh,counseling was done for etoh and tobacco cessation as applicable .Use of narcotic pain meds was discussed as applicable including  to use narcotic meds  wisely and to avoid overusing them

## 2018-04-25 NOTE — PROGRESS NOTE ADULT - PROBLEM SELECTOR PLAN 2
-? secondary to ETOH vs HCV  -HCV ab reactive, HCV RNA +, f/u genotype and riba  -f/u autoimmune labs  -ETOH abstinence

## 2018-04-25 NOTE — CONSULT NOTE ADULT - SUBJECTIVE AND OBJECTIVE BOX
Patient is a 72y old  Male who presents with a chief complaint of sob right upper quadrant pain for 2 weeks (23 Apr 2018 21:19)      HPI:  patient came to the office for ruq pain  sob for 1 week  refused cxr/colonoscopy in the past         ROS:    CONSTITUTIONAL: No fever, weight loss, or fatigue  EYES: No eye pain, visual disturbances, or discharge  ENMT:  No difficulty hearing, tinnitus, vertigo; No sinus or throat pain  NECK: No pain or stiffness  BREASTS: No pain, masses, or nipple discharge  RESPIRATORY: No cough, wheezing, chills or hemoptysis; No shortness of breath  CARDIOVASCULAR: No chest pain, palpitations, dizziness, or leg swelling, no decreased exercise tolerance  GASTROINTESTINAL: No abdominal or epigastric pain. No nausea, vomiting, or hematemesis; No diarrhea or constipation. No melena or hematochezia.  GENITOURINARY: No dysuria, frequency, hematuria, or incontinence  NEUROLOGICAL: No headaches, memory loss, loss of strength, numbness, or tremors  SKIN: No itching, burning, rashes, or lesions   LYMPH NODES: No enlargement or tenderness noted  ENDOCRINE: No heat or cold intolerance; No hair loss  MUSCULOSKELETAL: No muscle or back pain  PSYCHIATRIC: No depression, anxiety, mood swings, or difficulty sleeping  HEME/LYMPH: No easy bruising, or bleeding gums  ALLERGY AND IMMUNOLOGIC: No hives or eczema      PAST MEDICAL & SURGICAL HISTORY:  Hip arthritis: lt  Hypertension  No significant past surgical history      SOCIAL HISTORY:    FAMILY HISTORY:      MEDICATIONS  (STANDING):  ALBUTerol/ipratropium for Nebulization 3 milliLiter(s) Nebulizer every 8 hours  furosemide    Tablet 40 milliGRAM(s) Oral daily  metoprolol tartrate 50 milliGRAM(s) Oral every 6 hours  pantoprazole    Tablet 40 milliGRAM(s) Oral before breakfast  potassium chloride    Tablet ER 10 milliEquivalent(s) Oral daily  senna 2 Tablet(s) Oral at bedtime    MEDICATIONS  (PRN):  acetaminophen   Tablet. 325 milliGRAM(s) Oral every 4 hours PRN Mild Pain (1 - 3)  albumin human 25% IVPB 50 milliLiter(s) IV Intermittent once PRN see below  oxyCODONE    5 mG/acetaminophen 325 mG 1 Tablet(s) Oral every 4 hours PRN Moderate Pain (4 - 6)      Allergies    No Known Allergies    Intolerances        Vital Signs Last 24 Hrs  T(C): 36.6 (25 Apr 2018 07:54), Max: 36.8 (24 Apr 2018 13:23)  T(F): 97.8 (25 Apr 2018 07:54), Max: 98.3 (24 Apr 2018 13:23)  HR: 110 (25 Apr 2018 07:54) (62 - 117)  BP: 102/67 (25 Apr 2018 07:54) (99/60 - 110/79)  BP(mean): --  RR: 17 (25 Apr 2018 07:54) (17 - 18)  SpO2: 97% (25 Apr 2018 07:54) (94% - 97%)    PHYSICAL EXAM  General: adult in NAD  HEENT: clear oropharynx, anicteric sclera, pink conjunctivae  Neck: supple  CV: normal S1S2 with no murmur rubs or gallops  Lungs: clear to auscultation, no wheezes, no rhales  Abdomen: soft non-tender non-distended, no hepato/splenomegaly  Ext: no clubbing cyanosis or edema  Skin: no rashes and no petichiae  Neuro: alert and oriented X3 no focal deficits      LABS:    CBC Full  -  ( 25 Apr 2018 06:04 )  WBC Count : 11.3 K/uL  Hemoglobin : 13.3 g/dL  Hematocrit : 41.0 %  Platelet Count - Automated : 152 K/uL  Mean Cell Volume : 91.2 fl  Mean Cell Hemoglobin : 29.5 pg  Mean Cell Hemoglobin Concentration : 32.4 gm/dL  Auto Neutrophil # : 8.9 K/uL  Auto Lymphocyte # : 1.5 K/uL  Auto Monocyte # : 0.7 K/uL  Auto Eosinophil # : 0.2 K/uL  Auto Basophil # : 0.1 K/uL  Auto Neutrophil % : 78.7 %  Auto Lymphocyte % : 13.0 %  Auto Monocyte % : 6.1 %  Auto Eosinophil % : 1.6 %  Auto Basophil % : 0.5 %    04-25    140  |  103  |  21  ----------------------------<  91  4.0   |  27  |  1.20    Ca    8.3<L>      25 Apr 2018 06:04    TPro  5.9<L>  /  Alb  2.4<L>  /  TBili  2.3<H>  /  DBili  x   /  AST  60<H>  /  ALT  34  /  AlkPhos  289<H>  04-25    PT/INR - ( 23 Apr 2018 14:30 )   PT: 13.0 sec;   INR: 1.19 ratio             < from: US Duplex Venous Lower Ext Complete, Bilateral (04.24.18 @ 08:10) >  IMPRESSION:     No evidence of bilateral lower extremity deep venous thrombosis.    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 There is confluent   involvement of the right paratracheal, AP mediastinal and subcarinal   mediastinum, extending to the right azygo esophageal recess. The mass   surrounds and mildly narrows the bronchus intermedius.    There is bilateral centrilobular emphysematous disease.  There is an approximately 2.5 cm peripheral mass at the anterior right   upper lobe, with extension to the pleural surface and spiculated margins.   There is a small right-sided pleural effusion, with underlying   compressive atelectasis.  Small left-sided pleural effusion with underlying compressive atelectasis   is noted.    The central airways remain patent.    There is a nodular contour of the liver, likely reflecting cirrhotic   morphology.  There is a large, poorly defined, heterogeneous mass occupying the right   hepatic lobe, measuring approximate 7 cm. Central areas of decreased   attenuation may reflect necrosis.  There is an enlarged thrombosed main portal vein, with thrombus extending   from the portal confluence. There is heterogeneous soft tissue   enhancement compatible with tumor thrombus.  There is intrahepatic biliary ductal dilatation.    The spleen is enlarged measuring approximately 13 cm in length.  There are prominent collateral vessels in the perigastric, gastrohepatic   ligament and perisplenic region, findings likely reflecting portal venous   hypertension.    The gallbladder is distended, possibly containing gallstones.    There is heterogeneity at the head of the pancreas.    The adrenal glands are unremarkable in appearance.    There is a moderate volume of abdominal and pelvic ascites.    No hydronephrosis is noted.    There is arteriosclerotic calcification of the abdominal aorta, including   major branch vessels with aneurysmal dilatation of the bilateral common   iliac arteries.    No enlarged retroperitoneal lymphadenopathy is noted.    The evaluation of the stomach is limited without distention.  No bowel obstruction is noted. There is underdistended distention of the   colon, however, bowel wall thickening involving the right colon,   ascending colon through the hepatic flexure, and splenic flexure.    No free intraperitoneal air is noted.    There is a left inguinal hernia sac containing fluid.    The urinary bladder is moderately distended.  There are coarse central calcifications within the prostate gland.    There are multilevel degenerative changes of the lumbar spine.    There are severe degenerative changes at the left hip joint with   flattening of the femoral head and marked subchondral cystic erosion.    < end of copied text > Patient is a 72y old  Male who presents with a chief complaint of sob right upper quadrant pain for 2 weeks (23 Apr 2018 21:19)      Pt presented with several months h/o weight loss, weakness, LE edema, loose stool and abdominla discomfort.  pt also developed SOB over last month .    refused cxr/colonoscopy in the past           ROS:    CONSTITUTIONAL: No fever, weight loss, + fatigue  EYES: No eye pain, visual disturbances, or discharge  ENMT:  No difficulty hearing, tinnitus, vertigo; No sinus or throat pain  NECK: No pain or stiffness  BREASTS: No pain, masses, or nipple discharge  RESPIRATORY: No cough, wheezing, chills or hemoptysis; + shortness of breath  CARDIOVASCULAR: No chest pain, palpitations, dizziness, or leg swelling, no decreased exercise tolerance  GASTROINTESTINAL: No abdominal or epigastric pain. No nausea, vomiting, or hematemesis; + diarrhea or constipation. No melena or hematochezia.  GENITOURINARY: No dysuria, frequency, hematuria, or incontinence  NEUROLOGICAL: No headaches, memory loss, loss of strength, numbness, or tremors  SKIN: No itching, burning, rashes, or lesions   LYMPH NODES: No enlargement or tenderness noted  ENDOCRINE: No heat or cold intolerance; No hair loss  MUSCULOSKELETAL:+ swelling ankles   PSYCHIATRIC: No depression, anxiety, mood swings, or difficulty sleeping  HEME/LYMPH: No easy bruising, or bleeding gums  ALLERGY AND IMMUNOLOGIC: No hives or eczema      PAST MEDICAL & SURGICAL HISTORY:  Hip arthritis: lt  Hypertension  No significant past surgical history      SOCIAL HISTORY:    ETOH: wine   heavy ex smoker 2 packs per day     FAMILY HISTORY:  no malignancies ,has 2 siblings     MEDICATIONS  (STANDING):  ALBUTerol/ipratropium for Nebulization 3 milliLiter(s) Nebulizer every 8 hours  furosemide    Tablet 40 milliGRAM(s) Oral daily  metoprolol tartrate 50 milliGRAM(s) Oral every 6 hours  pantoprazole    Tablet 40 milliGRAM(s) Oral before breakfast  potassium chloride    Tablet ER 10 milliEquivalent(s) Oral daily  senna 2 Tablet(s) Oral at bedtime    MEDICATIONS  (PRN):  acetaminophen   Tablet. 325 milliGRAM(s) Oral every 4 hours PRN Mild Pain (1 - 3)  albumin human 25% IVPB 50 milliLiter(s) IV Intermittent once PRN see below  oxyCODONE    5 mG/acetaminophen 325 mG 1 Tablet(s) Oral every 4 hours PRN Moderate Pain (4 - 6)      Allergies    No Known Allergies    Intolerances        Vital Signs Last 24 Hrs  T(C): 36.6 (25 Apr 2018 07:54), Max: 36.8 (24 Apr 2018 13:23)  T(F): 97.8 (25 Apr 2018 07:54), Max: 98.3 (24 Apr 2018 13:23)  HR: 110 (25 Apr 2018 07:54) (62 - 117)  BP: 102/67 (25 Apr 2018 07:54) (99/60 - 110/79)  BP(mean): --  RR: 17 (25 Apr 2018 07:54) (17 - 18)  SpO2: 97% (25 Apr 2018 07:54) (94% - 97%)    PHYSICAL EXAM  General: adult in NAD  HEENT: clear oropharynx, anicteric sclera, pink conjunctivae  Neck: supple  CV: normal S1S2 with no murmur rubs or gallops  Lungs: clear to auscultation, no wheezes, no rales  Abdomen: soft non-tender non-distended, no hepatosplenomegaly  Ext: no clubbing cyanosis or edema  Skin: no rashes and no petechiae  Neuro: alert and oriented X3 no focal deficits      LABS:    CBC Full  -  ( 25 Apr 2018 06:04 )  WBC Count : 11.3 K/uL  Hemoglobin : 13.3 g/dL  Hematocrit : 41.0 %  Platelet Count - Automated : 152 K/uL  Mean Cell Volume : 91.2 fl  Mean Cell Hemoglobin : 29.5 pg  Mean Cell Hemoglobin Concentration : 32.4 gm/dL  Auto Neutrophil # : 8.9 K/uL  Auto Lymphocyte # : 1.5 K/uL  Auto Monocyte # : 0.7 K/uL  Auto Eosinophil # : 0.2 K/uL  Auto Basophil # : 0.1 K/uL  Auto Neutrophil % : 78.7 %  Auto Lymphocyte % : 13.0 %  Auto Monocyte % : 6.1 %  Auto Eosinophil % : 1.6 %  Auto Basophil % : 0.5 %    04-25    140  |  103  |  21  ----------------------------<  91  4.0   |  27  |  1.20    Ca    8.3<L>      25 Apr 2018 06:04    TPro  5.9<L>  /  Alb  2.4<L>  /  TBili  2.3<H>  /  DBili  x   /  AST  60<H>  /  ALT  34  /  AlkPhos  289<H>  04-25    PT/INR - ( 23 Apr 2018 14:30 )   PT: 13.0 sec;   INR: 1.19 ratio             < from: US Duplex Venous Lower Ext Complete, Bilateral (04.24.18 @ 08:10) >  IMPRESSION:     No evidence of bilateral lower extremity deep venous thrombosis.    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 There is confluent   involvement of the right paratracheal, AP mediastinal and subcarinal   mediastinum, extending to the right azygo esophageal recess. The mass   surrounds and mildly narrows the bronchus intermedius.    There is bilateral centrilobular emphysematous disease.  There is an approximately 2.5 cm peripheral mass at the anterior right   upper lobe, with extension to the pleural surface and spiculated margins.   There is a small right-sided pleural effusion, with underlying   compressive atelectasis.  Small left-sided pleural effusion with underlying compressive atelectasis   is noted.    The central airways remain patent.    There is a nodular contour of the liver, likely reflecting cirrhotic   morphology.  There is a large, poorly defined, heterogeneous mass occupying the right   hepatic lobe, measuring approximate 7 cm. Central areas of decreased   attenuation may reflect necrosis.  There is an enlarged thrombosed main portal vein, with thrombus extending   from the portal confluence. There is heterogeneous soft tissue   enhancement compatible with tumor thrombus.  There is intrahepatic biliary ductal dilatation.    The spleen is enlarged measuring approximately 13 cm in length.  There are prominent collateral vessels in the perigastric, gastrohepatic   ligament and perisplenic region, findings likely reflecting portal venous   hypertension.    The gallbladder is distended, possibly containing gallstones.    There is heterogeneity at the head of the pancreas.    The adrenal glands are unremarkable in appearance.    There is a moderate volume of abdominal and pelvic ascites.    No hydronephrosis is noted.    There is arteriosclerotic calcification of the abdominal aorta, including   major branch vessels with aneurysmal dilatation of the bilateral common   iliac arteries.    No enlarged retroperitoneal lymphadenopathy is noted.    The evaluation of the stomach is limited without distention.  No bowel obstruction is noted. There is underdistended distention of the   colon, however, bowel wall thickening involving the right colon,   ascending colon through the hepatic flexure, and splenic flexure.    No free intraperitoneal air is noted.    There is a left inguinal hernia sac containing fluid.    The urinary bladder is moderately distended.  There are coarse central calcifications within the prostate gland.    There are multilevel degenerative changes of the lumbar spine.    There are severe degenerative changes at the left hip joint with   flattening of the femoral head and marked subchondral cystic erosion.    < end of copied text > Patient is a 72y old  Male who presents with a chief complaint of sob right upper quadrant pain for 2 weeks (23 Apr 2018 21:19)      Pt presented with several months h/o weight loss, weakness, LE edema, loose stool and abdominla discomfort.  pt also developed SOB over last month    refused cxr/colonoscopy in the past           ROS:    CONSTITUTIONAL: No fever, weight loss, + fatigue  EYES: No eye pain, visual disturbances, or discharge  ENMT:  No difficulty hearing, tinnitus, vertigo; No sinus or throat pain  NECK: No pain or stiffness  BREASTS: No pain, masses, or nipple discharge  RESPIRATORY: No cough, wheezing, chills or hemoptysis; + shortness of breath  CARDIOVASCULAR: No chest pain, palpitations, dizziness, or leg swelling, no decreased exercise tolerance  GASTROINTESTINAL: No abdominal or epigastric pain. No nausea, vomiting, or hematemesis; + diarrhea or constipation. No melena or hematochezia.  GENITOURINARY: No dysuria, frequency, hematuria, or incontinence  NEUROLOGICAL: No headaches, memory loss, loss of strength, numbness, or tremors  SKIN: No itching, burning, rashes, or lesions   LYMPH NODES: No enlargement or tenderness noted  ENDOCRINE: No heat or cold intolerance; No hair loss  MUSCULOSKELETAL:+ swelling ankles   PSYCHIATRIC: No depression, anxiety, mood swings, or difficulty sleeping  HEME/LYMPH: No easy bruising, or bleeding gums  ALLERGY AND IMMUNOLOGIC: No hives or eczema      PAST MEDICAL & SURGICAL HISTORY:  Hip arthritis: lt  Hypertension  No significant past surgical history      SOCIAL HISTORY:    ETOH: wine   heavy ex smoker 2 packs per day     FAMILY HISTORY:  no malignancies ,has 2 siblings     MEDICATIONS  (STANDING):  ALBUTerol/ipratropium for Nebulization 3 milliLiter(s) Nebulizer every 8 hours  furosemide    Tablet 40 milliGRAM(s) Oral daily  metoprolol tartrate 50 milliGRAM(s) Oral every 6 hours  pantoprazole    Tablet 40 milliGRAM(s) Oral before breakfast  potassium chloride    Tablet ER 10 milliEquivalent(s) Oral daily  senna 2 Tablet(s) Oral at bedtime    MEDICATIONS  (PRN):  acetaminophen   Tablet. 325 milliGRAM(s) Oral every 4 hours PRN Mild Pain (1 - 3)  albumin human 25% IVPB 50 milliLiter(s) IV Intermittent once PRN see below  oxyCODONE    5 mG/acetaminophen 325 mG 1 Tablet(s) Oral every 4 hours PRN Moderate Pain (4 - 6)      Allergies    No Known Allergies    Intolerances        Vital Signs Last 24 Hrs  T(C): 36.6 (25 Apr 2018 07:54), Max: 36.8 (24 Apr 2018 13:23)  T(F): 97.8 (25 Apr 2018 07:54), Max: 98.3 (24 Apr 2018 13:23)  HR: 110 (25 Apr 2018 07:54) (62 - 117)  BP: 102/67 (25 Apr 2018 07:54) (99/60 - 110/79)  BP(mean): --  RR: 17 (25 Apr 2018 07:54) (17 - 18)  SpO2: 97% (25 Apr 2018 07:54) (94% - 97%)    PHYSICAL EXAM  General: adult in NAD  HEENT: clear oropharynx, anicteric sclera, pink conjunctivae  Neck: supple  CV: normal S1S2 with no murmur rubs or gallops  Lungs: clear to auscultation, no wheezes, no rales  Abdomen: soft non-tender non-distended, no hepatosplenomegaly umbilical LN s/p paracentesis   Ext:mild trace LE edema at ankles   Skin: no rashes and no petechiae  Neuro: alert and oriented X3 no focal deficits      LABS:    CBC Full  -  ( 25 Apr 2018 06:04 )  WBC Count : 11.3 K/uL  Hemoglobin : 13.3 g/dL  Hematocrit : 41.0 %  Platelet Count - Automated : 152 K/uL  Mean Cell Volume : 91.2 fl  Mean Cell Hemoglobin : 29.5 pg  Mean Cell Hemoglobin Concentration : 32.4 gm/dL  Auto Neutrophil # : 8.9 K/uL  Auto Lymphocyte # : 1.5 K/uL  Auto Monocyte # : 0.7 K/uL  Auto Eosinophil # : 0.2 K/uL  Auto Basophil # : 0.1 K/uL  Auto Neutrophil % : 78.7 %  Auto Lymphocyte % : 13.0 %  Auto Monocyte % : 6.1 %  Auto Eosinophil % : 1.6 %  Auto Basophil % : 0.5 %    04-25    140  |  103  |  21  ----------------------------<  91  4.0   |  27  |  1.20    Ca    8.3<L>      25 Apr 2018 06:04    TPro  5.9<L>  /  Alb  2.4<L>  /  TBili  2.3<H>  /  DBili  x   /  AST  60<H>  /  ALT  34  /  AlkPhos  289<H>  04-25    PT/INR - ( 23 Apr 2018 14:30 )   PT: 13.0 sec;   INR: 1.19 ratio             < from: US Duplex Venous Lower Ext Complete, Bilateral (04.24.18 @ 08:10) >  IMPRESSION:     No evidence of bilateral lower extremity deep venous thrombosis.    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 There is confluent   involvement of the right paratracheal, AP mediastinal and subcarinal   mediastinum, extending to the right azygo esophageal recess. The mass   surrounds and mildly narrows the bronchus intermedius.    There is bilateral centrilobular emphysematous disease.  There is an approximately 2.5 cm peripheral mass at the anterior right   upper lobe, with extension to the pleural surface and spiculated margins.   There is a small right-sided pleural effusion, with underlying   compressive atelectasis.  Small left-sided pleural effusion with underlying compressive atelectasis   is noted.    The central airways remain patent.    There is a nodular contour of the liver, likely reflecting cirrhotic   morphology.  There is a large, poorly defined, heterogeneous mass occupying the right   hepatic lobe, measuring approximate 7 cm. Central areas of decreased   attenuation may reflect necrosis.  There is an enlarged thrombosed main portal vein, with thrombus extending   from the portal confluence. There is heterogeneous soft tissue   enhancement compatible with tumor thrombus.  There is intrahepatic biliary ductal dilatation.    The spleen is enlarged measuring approximately 13 cm in length.  There are prominent collateral vessels in the perigastric, gastrohepatic   ligament and perisplenic region, findings likely reflecting portal venous   hypertension.    The gallbladder is distended, possibly containing gallstones.    There is heterogeneity at the head of the pancreas.    The adrenal glands are unremarkable in appearance.    There is a moderate volume of abdominal and pelvic ascites.    No hydronephrosis is noted.    There is arteriosclerotic calcification of the abdominal aorta, including   major branch vessels with aneurysmal dilatation of the bilateral common   iliac arteries.    No enlarged retroperitoneal lymphadenopathy is noted.    The evaluation of the stomach is limited without distention.  No bowel obstruction is noted. There is underdistended distention of the   colon, however, bowel wall thickening involving the right colon,   ascending colon through the hepatic flexure, and splenic flexure.    No free intraperitoneal air is noted.    There is a left inguinal hernia sac containing fluid.    The urinary bladder is moderately distended.  There are coarse central calcifications within the prostate gland.    There are multilevel degenerative changes of the lumbar spine.    There are severe degenerative changes at the left hip joint with   flattening of the femoral head and marked subchondral cystic erosion.    < end of copied text >

## 2018-04-25 NOTE — PROGRESS NOTE ADULT - PROBLEM SELECTOR PLAN 1
-AFP/CEA elevated  -IR guided bx/paracentesis planned for today per RN  -f/u bx and fluid results  -give IV albumin as needed  -f/u heme/onc

## 2018-04-26 DIAGNOSIS — R50.9 FEVER, UNSPECIFIED: ICD-10-CM

## 2018-04-26 DIAGNOSIS — J96.21 ACUTE AND CHRONIC RESPIRATORY FAILURE WITH HYPOXIA: ICD-10-CM

## 2018-04-26 DIAGNOSIS — I48.91 UNSPECIFIED ATRIAL FIBRILLATION: ICD-10-CM

## 2018-04-26 DIAGNOSIS — K74.60 UNSPECIFIED CIRRHOSIS OF LIVER: ICD-10-CM

## 2018-04-26 DIAGNOSIS — I48.1 PERSISTENT ATRIAL FIBRILLATION: ICD-10-CM

## 2018-04-26 DIAGNOSIS — I81 PORTAL VEIN THROMBOSIS: ICD-10-CM

## 2018-04-26 DIAGNOSIS — J96.00 ACUTE RESPIRATORY FAILURE, UNSPECIFIED WHETHER WITH HYPOXIA OR HYPERCAPNIA: ICD-10-CM

## 2018-04-26 LAB
ALBUMIN SERPL ELPH-MCNC: 2.5 G/DL — LOW (ref 3.3–5)
ALBUMIN SERPL ELPH-MCNC: 2.6 G/DL — LOW (ref 3.3–5)
ALP SERPL-CCNC: 339 U/L — HIGH (ref 40–120)
ALP SERPL-CCNC: 367 U/L — HIGH (ref 40–120)
ALT FLD-CCNC: 42 U/L — SIGNIFICANT CHANGE UP (ref 12–78)
ALT FLD-CCNC: 44 U/L — SIGNIFICANT CHANGE UP (ref 12–78)
ANION GAP SERPL CALC-SCNC: 11 MMOL/L — SIGNIFICANT CHANGE UP (ref 5–17)
ANION GAP SERPL CALC-SCNC: 16 MMOL/L — SIGNIFICANT CHANGE UP (ref 5–17)
AST SERPL-CCNC: 100 U/L — HIGH (ref 15–37)
AST SERPL-CCNC: 83 U/L — HIGH (ref 15–37)
BASE EXCESS BLDA CALC-SCNC: -3.8 MMOL/L — LOW (ref -2–2)
BASE EXCESS BLDA CALC-SCNC: -7.4 MMOL/L — LOW (ref -2–2)
BASOPHILS # BLD AUTO: 0.1 K/UL — SIGNIFICANT CHANGE UP (ref 0–0.2)
BASOPHILS NFR BLD AUTO: 0.7 % — SIGNIFICANT CHANGE UP (ref 0–2)
BILIRUB SERPL-MCNC: 2.7 MG/DL — HIGH (ref 0.2–1.2)
BILIRUB SERPL-MCNC: 3 MG/DL — HIGH (ref 0.2–1.2)
BLOOD GAS COMMENTS ARTERIAL: SIGNIFICANT CHANGE UP
BLOOD GAS COMMENTS ARTERIAL: SIGNIFICANT CHANGE UP
BUN SERPL-MCNC: 21 MG/DL — SIGNIFICANT CHANGE UP (ref 7–23)
BUN SERPL-MCNC: 21 MG/DL — SIGNIFICANT CHANGE UP (ref 7–23)
C-ANCA SER-ACNC: NEGATIVE — SIGNIFICANT CHANGE UP
CALCIUM SERPL-MCNC: 8.3 MG/DL — LOW (ref 8.5–10.1)
CALCIUM SERPL-MCNC: 8.4 MG/DL — LOW (ref 8.5–10.1)
CHLORIDE SERPL-SCNC: 102 MMOL/L — SIGNIFICANT CHANGE UP (ref 96–108)
CHLORIDE SERPL-SCNC: 103 MMOL/L — SIGNIFICANT CHANGE UP (ref 96–108)
CK MB BLD-MCNC: 1.4 % — SIGNIFICANT CHANGE UP (ref 0–3.5)
CK MB CFR SERPL CALC: 1.3 NG/ML — SIGNIFICANT CHANGE UP (ref 0–3.6)
CK SERPL-CCNC: 94 U/L — SIGNIFICANT CHANGE UP (ref 26–308)
CO2 SERPL-SCNC: 21 MMOL/L — LOW (ref 22–31)
CO2 SERPL-SCNC: 27 MMOL/L — SIGNIFICANT CHANGE UP (ref 22–31)
CREAT SERPL-MCNC: 1.3 MG/DL — SIGNIFICANT CHANGE UP (ref 0.5–1.3)
CREAT SERPL-MCNC: 1.5 MG/DL — HIGH (ref 0.5–1.3)
EOSINOPHIL # BLD AUTO: 0.1 K/UL — SIGNIFICANT CHANGE UP (ref 0–0.5)
EOSINOPHIL NFR BLD AUTO: 0.5 % — SIGNIFICANT CHANGE UP (ref 0–6)
GLUCOSE FLD-MCNC: 113 MG/DL — SIGNIFICANT CHANGE UP
GLUCOSE SERPL-MCNC: 114 MG/DL — HIGH (ref 70–99)
GLUCOSE SERPL-MCNC: 73 MG/DL — SIGNIFICANT CHANGE UP (ref 70–99)
GRAM STN FLD: SIGNIFICANT CHANGE UP
HCO3 BLDA-SCNC: 20 MMOL/L — LOW (ref 23–27)
HCO3 BLDA-SCNC: 22 MMOL/L — LOW (ref 23–27)
HCT VFR BLD CALC: 44 % — SIGNIFICANT CHANGE UP (ref 39–50)
HCT VFR BLD CALC: 44.1 % — SIGNIFICANT CHANGE UP (ref 39–50)
HCV RNA SERPL NAA DL=5-ACNC: SIGNIFICANT CHANGE UP
HCV RNA SPEC NAA+PROBE-LOG IU: SIGNIFICANT CHANGE UP LOGIU/ML
HGB BLD-MCNC: 14.1 G/DL — SIGNIFICANT CHANGE UP (ref 13–17)
HGB BLD-MCNC: 14.7 G/DL — SIGNIFICANT CHANGE UP (ref 13–17)
HOROWITZ INDEX BLDA+IHG-RTO: 100 — SIGNIFICANT CHANGE UP
HOROWITZ INDEX BLDA+IHG-RTO: SIGNIFICANT CHANGE UP
LDH SERPL L TO P-CCNC: 37 U/L — SIGNIFICANT CHANGE UP
LKM AB SER-ACNC: 1 UNITS — SIGNIFICANT CHANGE UP (ref 0–20)
LYMPHOCYTES # BLD AUTO: 0.7 K/UL — LOW (ref 1–3.3)
LYMPHOCYTES # BLD AUTO: 6.5 % — LOW (ref 13–44)
LYMPHOCYTES # BLD AUTO: 8 % — LOW (ref 13–44)
MAGNESIUM SERPL-MCNC: 1.8 MG/DL — SIGNIFICANT CHANGE UP (ref 1.6–2.6)
MCHC RBC-ENTMCNC: 29.3 PG — SIGNIFICANT CHANGE UP (ref 27–34)
MCHC RBC-ENTMCNC: 29.6 PG — SIGNIFICANT CHANGE UP (ref 27–34)
MCHC RBC-ENTMCNC: 32 GM/DL — SIGNIFICANT CHANGE UP (ref 32–36)
MCHC RBC-ENTMCNC: 33.2 GM/DL — SIGNIFICANT CHANGE UP (ref 32–36)
MCV RBC AUTO: 89.3 FL — SIGNIFICANT CHANGE UP (ref 80–100)
MCV RBC AUTO: 91.6 FL — SIGNIFICANT CHANGE UP (ref 80–100)
MITOCHONDRIA AB SER-ACNC: SIGNIFICANT CHANGE UP
MONOCYTES # BLD AUTO: 0.1 K/UL — SIGNIFICANT CHANGE UP (ref 0–0.9)
MONOCYTES NFR BLD AUTO: 0.6 % — LOW (ref 1–9)
MONOCYTES NFR BLD AUTO: 1 % — SIGNIFICANT CHANGE UP (ref 1–9)
NEUTROPHILS # BLD AUTO: 10.3 K/UL — HIGH (ref 1.8–7.4)
NEUTROPHILS NFR BLD AUTO: 83 % — HIGH (ref 43–77)
NEUTROPHILS NFR BLD AUTO: 91.6 % — HIGH (ref 43–77)
P-ANCA SER-ACNC: NEGATIVE — SIGNIFICANT CHANGE UP
PCO2 BLDA: 25 MMHG — LOW (ref 32–46)
PCO2 BLDA: 28 MMHG — LOW (ref 32–46)
PH BLDA: 7.43 — SIGNIFICANT CHANGE UP (ref 7.35–7.45)
PH BLDA: 7.46 — HIGH (ref 7.35–7.45)
PHOSPHATE SERPL-MCNC: 2.9 MG/DL — SIGNIFICANT CHANGE UP (ref 2.5–4.5)
PLATELET # BLD AUTO: 179 K/UL — SIGNIFICANT CHANGE UP (ref 150–400)
PLATELET # BLD AUTO: 179 K/UL — SIGNIFICANT CHANGE UP (ref 150–400)
PO2 BLDA: 260 MMHG — HIGH (ref 74–108)
PO2 BLDA: 358 MMHG — HIGH (ref 74–108)
POTASSIUM SERPL-MCNC: 4.1 MMOL/L — SIGNIFICANT CHANGE UP (ref 3.5–5.3)
POTASSIUM SERPL-MCNC: 4.2 MMOL/L — SIGNIFICANT CHANGE UP (ref 3.5–5.3)
POTASSIUM SERPL-SCNC: 4.1 MMOL/L — SIGNIFICANT CHANGE UP (ref 3.5–5.3)
POTASSIUM SERPL-SCNC: 4.2 MMOL/L — SIGNIFICANT CHANGE UP (ref 3.5–5.3)
PROT FLD-MCNC: <1 G/DL — SIGNIFICANT CHANGE UP
PROT SERPL-MCNC: 6.4 G/DL — SIGNIFICANT CHANGE UP (ref 6–8.3)
PROT SERPL-MCNC: 6.4 G/DL — SIGNIFICANT CHANGE UP (ref 6–8.3)
RBC # BLD: 4.81 M/UL — SIGNIFICANT CHANGE UP (ref 4.2–5.8)
RBC # BLD: 4.94 M/UL — SIGNIFICANT CHANGE UP (ref 4.2–5.8)
RBC # FLD: 15.1 % — HIGH (ref 10.3–14.5)
RBC # FLD: 15.4 % — HIGH (ref 10.3–14.5)
SAO2 % BLDA: 100 % — HIGH (ref 92–96)
SAO2 % BLDA: 100 % — HIGH (ref 92–96)
SMOOTH MUSCLE AB SER-ACNC: SIGNIFICANT CHANGE UP
SODIUM SERPL-SCNC: 140 MMOL/L — SIGNIFICANT CHANGE UP (ref 135–145)
SODIUM SERPL-SCNC: 140 MMOL/L — SIGNIFICANT CHANGE UP (ref 135–145)
SPECIMEN SOURCE: SIGNIFICANT CHANGE UP
TROPONIN I SERPL-MCNC: 0.03 NG/ML — SIGNIFICANT CHANGE UP (ref 0.01–0.04)
TTG IGA SER-ACNC: 6.6 UNITS — SIGNIFICANT CHANGE UP
TTG IGA SER-ACNC: NEGATIVE — SIGNIFICANT CHANGE UP
TTG IGG SER IA-ACNC: NEGATIVE — SIGNIFICANT CHANGE UP
TTG IGG SER-ACNC: 5.7 UNITS — SIGNIFICANT CHANGE UP
WBC # BLD: 11.2 K/UL — HIGH (ref 3.8–10.5)
WBC # BLD: 6.9 K/UL — SIGNIFICANT CHANGE UP (ref 3.8–10.5)
WBC # FLD AUTO: 11.2 K/UL — HIGH (ref 3.8–10.5)
WBC # FLD AUTO: 6.9 K/UL — SIGNIFICANT CHANGE UP (ref 3.8–10.5)

## 2018-04-26 PROCEDURE — 93010 ELECTROCARDIOGRAM REPORT: CPT

## 2018-04-26 PROCEDURE — 71045 X-RAY EXAM CHEST 1 VIEW: CPT | Mod: 26

## 2018-04-26 PROCEDURE — 71275 CT ANGIOGRAPHY CHEST: CPT | Mod: 26

## 2018-04-26 RX ORDER — MIDODRINE HYDROCHLORIDE 2.5 MG/1
5 TABLET ORAL THREE TIMES A DAY
Qty: 0 | Refills: 0 | Status: DISCONTINUED | OUTPATIENT
Start: 2018-04-26 | End: 2018-04-26

## 2018-04-26 RX ORDER — ALBUMIN HUMAN 25 %
100 VIAL (ML) INTRAVENOUS ONCE
Qty: 0 | Refills: 0 | Status: COMPLETED | OUTPATIENT
Start: 2018-04-26 | End: 2018-04-26

## 2018-04-26 RX ORDER — DIGOXIN 250 MCG
0.12 TABLET ORAL ONCE
Qty: 0 | Refills: 0 | Status: DISCONTINUED | OUTPATIENT
Start: 2018-04-26 | End: 2018-04-26

## 2018-04-26 RX ORDER — DIGOXIN 250 MCG
0.25 TABLET ORAL EVERY 6 HOURS
Qty: 0 | Refills: 0 | Status: COMPLETED | OUTPATIENT
Start: 2018-04-26 | End: 2018-04-26

## 2018-04-26 RX ORDER — ALBUMIN HUMAN 25 %
VIAL (ML) INTRAVENOUS
Qty: 0 | Refills: 0 | Status: COMPLETED | OUTPATIENT
Start: 2018-04-26 | End: 2018-04-27

## 2018-04-26 RX ORDER — ACETAMINOPHEN 500 MG
325 TABLET ORAL EVERY 4 HOURS
Qty: 0 | Refills: 0 | Status: DISCONTINUED | OUTPATIENT
Start: 2018-04-26 | End: 2018-04-26

## 2018-04-26 RX ORDER — MIDODRINE HYDROCHLORIDE 2.5 MG/1
15 TABLET ORAL EVERY 8 HOURS
Qty: 0 | Refills: 0 | Status: DISCONTINUED | OUTPATIENT
Start: 2018-04-26 | End: 2018-04-29

## 2018-04-26 RX ORDER — DILTIAZEM HCL 120 MG
10 CAPSULE, EXT RELEASE 24 HR ORAL
Qty: 125 | Refills: 0 | Status: DISCONTINUED | OUTPATIENT
Start: 2018-04-26 | End: 2018-04-26

## 2018-04-26 RX ORDER — VANCOMYCIN HCL 1 G
1000 VIAL (EA) INTRAVENOUS ONCE
Qty: 0 | Refills: 0 | Status: COMPLETED | OUTPATIENT
Start: 2018-04-26 | End: 2018-04-26

## 2018-04-26 RX ORDER — DIGOXIN 250 MCG
0.25 TABLET ORAL ONCE
Qty: 0 | Refills: 0 | Status: COMPLETED | OUTPATIENT
Start: 2018-04-26 | End: 2018-04-26

## 2018-04-26 RX ORDER — ENOXAPARIN SODIUM 100 MG/ML
70 INJECTION SUBCUTANEOUS EVERY 12 HOURS
Qty: 0 | Refills: 0 | Status: DISCONTINUED | OUTPATIENT
Start: 2018-04-26 | End: 2018-04-29

## 2018-04-26 RX ORDER — NOREPINEPHRINE BITARTRATE/D5W 8 MG/250ML
0.03 PLASTIC BAG, INJECTION (ML) INTRAVENOUS
Qty: 8 | Refills: 0 | Status: DISCONTINUED | OUTPATIENT
Start: 2018-04-26 | End: 2018-04-27

## 2018-04-26 RX ORDER — ONDANSETRON 8 MG/1
4 TABLET, FILM COATED ORAL ONCE
Qty: 0 | Refills: 0 | Status: COMPLETED | OUTPATIENT
Start: 2018-04-26 | End: 2018-04-26

## 2018-04-26 RX ORDER — IBUPROFEN 200 MG
600 TABLET ORAL ONCE
Qty: 0 | Refills: 0 | Status: COMPLETED | OUTPATIENT
Start: 2018-04-26 | End: 2018-04-26

## 2018-04-26 RX ORDER — SODIUM CHLORIDE 9 MG/ML
500 INJECTION, SOLUTION INTRAVENOUS ONCE
Qty: 0 | Refills: 0 | Status: COMPLETED | OUTPATIENT
Start: 2018-04-26 | End: 2018-04-26

## 2018-04-26 RX ORDER — ALBUMIN HUMAN 25 %
50 VIAL (ML) INTRAVENOUS EVERY 6 HOURS
Qty: 0 | Refills: 0 | Status: COMPLETED | OUTPATIENT
Start: 2018-04-27 | End: 2018-04-27

## 2018-04-26 RX ORDER — PIPERACILLIN AND TAZOBACTAM 4; .5 G/20ML; G/20ML
3.38 INJECTION, POWDER, LYOPHILIZED, FOR SOLUTION INTRAVENOUS EVERY 8 HOURS
Qty: 0 | Refills: 0 | Status: DISCONTINUED | OUTPATIENT
Start: 2018-04-26 | End: 2018-04-29

## 2018-04-26 RX ADMIN — SENNA PLUS 2 TABLET(S): 8.6 TABLET ORAL at 21:52

## 2018-04-26 RX ADMIN — Medication 50 MILLIGRAM(S): at 11:48

## 2018-04-26 RX ADMIN — PIPERACILLIN AND TAZOBACTAM 25 GRAM(S): 4; .5 INJECTION, POWDER, LYOPHILIZED, FOR SOLUTION INTRAVENOUS at 15:05

## 2018-04-26 RX ADMIN — Medication 0.25 MILLIGRAM(S): at 06:43

## 2018-04-26 RX ADMIN — Medication 0.25 MILLIGRAM(S): at 17:56

## 2018-04-26 RX ADMIN — Medication 600 MILLIGRAM(S): at 10:39

## 2018-04-26 RX ADMIN — OXYCODONE AND ACETAMINOPHEN 1 TABLET(S): 5; 325 TABLET ORAL at 13:55

## 2018-04-26 RX ADMIN — Medication 40 MILLIGRAM(S): at 06:41

## 2018-04-26 RX ADMIN — Medication 4.08 MICROGRAM(S)/KG/MIN: at 22:30

## 2018-04-26 RX ADMIN — Medication 250 MILLIGRAM(S): at 11:18

## 2018-04-26 RX ADMIN — Medication 10 MILLIEQUIVALENT(S): at 11:48

## 2018-04-26 RX ADMIN — MIDODRINE HYDROCHLORIDE 15 MILLIGRAM(S): 2.5 TABLET ORAL at 15:49

## 2018-04-26 RX ADMIN — ONDANSETRON 4 MILLIGRAM(S): 8 TABLET, FILM COATED ORAL at 10:07

## 2018-04-26 RX ADMIN — PIPERACILLIN AND TAZOBACTAM 25 GRAM(S): 4; .5 INJECTION, POWDER, LYOPHILIZED, FOR SOLUTION INTRAVENOUS at 21:53

## 2018-04-26 RX ADMIN — Medication 4.08 MICROGRAM(S)/KG/MIN: at 14:31

## 2018-04-26 RX ADMIN — MIDODRINE HYDROCHLORIDE 15 MILLIGRAM(S): 2.5 TABLET ORAL at 21:53

## 2018-04-26 RX ADMIN — Medication 600 MILLIGRAM(S): at 10:41

## 2018-04-26 RX ADMIN — Medication 50 MILLILITER(S): at 19:31

## 2018-04-26 RX ADMIN — PIPERACILLIN AND TAZOBACTAM 25 GRAM(S): 4; .5 INJECTION, POWDER, LYOPHILIZED, FOR SOLUTION INTRAVENOUS at 12:25

## 2018-04-26 RX ADMIN — Medication 0.25 MILLIGRAM(S): at 12:33

## 2018-04-26 RX ADMIN — ENOXAPARIN SODIUM 70 MILLIGRAM(S): 100 INJECTION SUBCUTANEOUS at 17:56

## 2018-04-26 RX ADMIN — Medication 10 MG/HR: at 10:08

## 2018-04-26 RX ADMIN — Medication 50 MILLIGRAM(S): at 06:40

## 2018-04-26 RX ADMIN — SODIUM CHLORIDE 1000 MILLILITER(S): 9 INJECTION, SOLUTION INTRAVENOUS at 06:40

## 2018-04-26 RX ADMIN — OXYCODONE AND ACETAMINOPHEN 1 TABLET(S): 5; 325 TABLET ORAL at 12:54

## 2018-04-26 RX ADMIN — PANTOPRAZOLE SODIUM 40 MILLIGRAM(S): 20 TABLET, DELAYED RELEASE ORAL at 06:41

## 2018-04-26 NOTE — PROGRESS NOTE ADULT - PROBLEM SELECTOR PLAN 1
-AFP/CEA elevated  -trend hepatic panel  -s/p IR guided bx/paracentesis 4/25; para revealed clear fluid, micro neg  -f/u bx and further fluid results  -f/u heme/onc, pending lung bx

## 2018-04-26 NOTE — CONSULT NOTE ADULT - SUBJECTIVE AND OBJECTIVE BOX
[facility], Division of Infectious Diseases  SAUL Kyle A. Lee  [phone]  MAKENZIEANANYASHELDON, DIONE  72y, Male  [id number]    HPI:  72m + tobacco patient went to  office for ruq pain  and sob for 1 week  He was being worked up preop clearance for THR and noted abnormal lfts.   Then Pcp sent to hospital with abdominal distention and edema  In ED, pt with new afib.  And CT scan with liver mass.  He had a bx 4/25.  This am  rrt called for rapid afib with resp distress.  T 104      PMH/PSH--  CHF (congestive heart failure)  Hip arthritis  Hypertension  No significant past surgical history      Allergies--NKDA      Medications--  Antibiotics: piperacillin/tazobactam IVPB. 3.375 Gram(s) IV Intermittent every 8 hours    Immunologic:   Other: albumin human 25% IVPB PRN  ALBUTerol/ipratropium for Nebulization  digoxin  Injectable  diltiazem Infusion  enoxaparin Injectable  furosemide    Tablet  metoprolol tartrate  oxyCODONE    5 mG/acetaminophen 325 mG PRN  pantoprazole    Tablet  potassium chloride    Tablet ER  senna      Social History--  EtOH: denies ***  Tobacco: +++  Drug Use: denies ***    Family/Marital History--  NC    Remainder not relevant to clinical concern.    Travel/Environmental/Occupational History:  retired    Review of Systems:  A >=10-point review of systems was obtained.     Pertinent positives and negatives--  Constitutional: No fevers. No Chills. No Rigors. + wt loss   Eyes: no blurry vision  ENMT: no sore throat  Cardiovascular: No chest pain. No palpitations.  Respiratory: ++shortness of breath. ++ cough.  Gastrointestinal: No nausea or vomiting. ++diarrhea   Genitourinary: no dyruria  Musculoskeletal: + arthritis  Skin: no rash  Neurologic: no headache  Psychiatric: no depression.    Review of systems otherwise negative except as previously noted.    Physical Exam--  Vital Signs: T(F): 104.4 (04-26-18 @ 10:36), Max: 104.4 (04-26-18 @ 10:36)  HR: 153 (04-26-18 @ 11:00)  BP: 93/64 (04-26-18 @ 11:00)  RR: 34 (04-26-18 @ 11:00)  SpO2: 96% (04-26-18 @ 11:00)  Wt(kg): --  General: Nontoxic-appearing Male in no acute distress.  HEENT: AT/NC. + icteric. Conjunctiva pink and moist. Oropharynx clear. Dentition fair.  Neck: Not rigid. No sense of mass.  Nodes: None palpable.  Lungs: decreased bs  Heart: tachy s1s2  Abdomen: Bowel sounds present and normoactive. Soft. +++distended. Nontender.  Back: No spinal tenderness. No costovertebral angle tenderness.   Extremities: No cyanosis or clubbing. No edema.   Skin: jaundice  Psychiatric: Appropriate affect and mood for situation.         Laboratory & Imaging Data--  CBC                        14.1   6.9   )-----------( 179      ( 26 Apr 2018 09:57 )             44.0       Chemistries  04-26    140  |  103  |  21  ----------------------------<  73  4.2   |  21<L>  |  1.50<H>    Ca    8.3<L>      26 Apr 2018 09:57  Phos  2.9     04-26  Mg     1.8     04-26    TPro  6.4  /  Alb  2.5<L>  /  TBili  3.0<H>  /  DBili  x   /  AST  100<H>  /  ALT  42  /  AlkPhos  367<H>  04-26      Culture Data    Culture - Body Fluid with Gram Stain (collected 25 Apr 2018 16:51)  Source: Peritoneal Peritoneal Fluid  Gram Stain (26 Apr 2018 02:48):    No polymorphonuclear cells seen per low power field    No organisms seen per oil power field    by cytocentrifuge        < from: CT Angio Chest w/ IV Cont (04.26.18 @ 07:55) >  EXAM:  CT ANGIO CHEST (W)AW IC                            PROCEDURE DATE:  04/26/2018          INTERPRETATION:  CLINICAL INFORMATION:  Shortness of breath.    PROCEDURE:  Using multislice helical technique,  CT angiography, 1.5 mm   sections were obtained  following the intravenous administration of 94   ccs of Omnipaque 350.  Multiplanar MIP reconstructed images were obtained.    FINDINGS:      Comparison: CT scan 4/23/2018.    There is large conglomerate low-density mediastinal and hilar mass,   involving the right paratracheal, AP mediastinal window, subcarinal and   right hilum, which encases and narrows the truncus anteriosus and   bronchus intermedius.  No intraluminal pulmonary artery filling defects are noted.    There is bilateral centrilobular emphysematous disease.  Again noted is an approximately 2.5 cm mass with spiculated margins at   the periphery of the anterior right upper lobe. This extends to the   pleural surface. There is a small left-sided pleural effusion with   underlying compressive atelectasis.  There is a trace right-sided pleural effusion.    Limited images that include the upper abdomen demonstrate abdominal   ascites.  There is a poorly defined mass right hepatic lobe with small foci of air,   likely related to recent biopsy.    Impression:    Conglomerate mediastinal and hilar lymphadenopathy, which is narrowing   the pulmonary truncus arteriosus, without CT evidence of acute pulmonary   emboli.    Spiculated right upper lobe mass, suspicious for lung neoplasm.    Recommend further evaluation with PET/CT scan.    Other findings as discussed above.      < end of copied text >        \< from: Xray Chest 1 View- PORTABLE-Urgent (04.26.18 @ 06:29) >  XAM:  XR CHEST PORTABLE URGENT 1V                            PROCEDURE DATE:  04/26/2018          INTERPRETATION:  Chest portable.    Clinical History: Shortness of breath, desaturation.    Comparison: 4/23/2018.    Single AP view submitted.    The evaluation of the cardiomediastinal silhouette is limited on portable   technique.    There is left lower lobe airspace consolidation which may reflect   atelectasis and/or pneumonia in the appropriate clinical setting. There   is blunting at the left costophrenic angle compatible with small pleural   effusion.  There is subsegmental atelectasis and/or fibrosis right lung base.    Impression:    Findings as discussed    < end of copied text >      < from: US Duplex Venous Lower Ext Complete, Bilateral (04.24.18 @ 08:10) >    EXAM:  US DPLX LWR EXT VEINS COMPL BI                            PROCEDURE DATE:  04/24/2018          INTERPRETATION:  CLINICAL INFORMATION: Dyspnea    COMPARISON: None available.    TECHNIQUE: Duplex sonography of the BILATERAL LOWER extremities with   color and spectral Doppler, with and without compression.      FINDINGS:    There is normal compressibility of the bilateral common femoral, femoral   and popliteal veins. No calf vein thrombosis is detected.    Doppler examination shows normal spontaneous and phasic flow.    IMPRESSION:     No evidence of bilateral lower extremity deep venous thrombosis.      < end of copied text >      < from: US Gallbladder (04.23.18 @ 15:34) >  XAM:  US GALLBLADDER                            PROCEDURE DATE:  04/23/2018          INTERPRETATION:  Gallbladder ultrasound    Clinical indications: Right upper quadrant pain    Comparison: None    FINDINGS:     The gallbladder is distended and contains multiple stones. There is   borderline gallbladder bladder wall thickening. Sonographic Nunez sign   is negative.    There is right upper quadrant ascites. The visualized liver is   heterogeneous and nodular. There is a round, slightly echogenic lesion   within the right hepatic lobe measuring 6.7 x 6.3 x 6.5 cm. The main   portal vein appears thrombosed with multiple adjacent collaterals.    IMPRESSION:      Gallstones and borderline gallbladder wall thickening, possibly reactive   due to ascites. Sonographic Nunez's sign is negative.    Heterogeneous liver with 6.7 cm mass in the right hepatic lobe. 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 Further evaluation with abdominal CT   or MRI is recommended.        < end of copied text >

## 2018-04-26 NOTE — CONSULT NOTE ADULT - PROBLEM SELECTOR RECOMMENDATION 9
check AFP  Called IR for possible liver biopsy  Pt took baby ASA yesterday  Oncology Eval
blood cx times 2  ua and cx  empiric broad spectrum antibx  follow temp curve  and wbc  check rvp

## 2018-04-26 NOTE — PROGRESS NOTE ADULT - ASSESSMENT
73 y/o man presented with abdominal pain, SOB and was found to have7 cm  right hepatic mass with extensive tumoral thrombosis, ascites, spiculated RUL mass with confluent right hilar and mediastinal lymphadenopathy.      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

## 2018-04-26 NOTE — PROGRESS NOTE ADULT - SUBJECTIVE AND OBJECTIVE BOX
INTERVAL HPI/OVERNIGHT EVENTS:  pt seen and examined earlier this morning  denies complaints  no n/v/d/c/abd pain/hematemesis/melena/brbpr  s/p IR yesterday for liver bx and para    MEDICATIONS  (STANDING):  ALBUTerol/ipratropium for Nebulization 3 milliLiter(s) Nebulizer every 8 hours  enoxaparin Injectable 70 milliGRAM(s) SubCutaneous every 12 hours  furosemide    Tablet 40 milliGRAM(s) Oral daily  metoprolol tartrate 50 milliGRAM(s) Oral every 6 hours  pantoprazole    Tablet 40 milliGRAM(s) Oral before breakfast  potassium chloride    Tablet ER 10 milliEquivalent(s) Oral daily  senna 2 Tablet(s) Oral at bedtime    MEDICATIONS  (PRN):  acetaminophen   Tablet. 325 milliGRAM(s) Oral every 4 hours PRN Mild Pain (1 - 3)  albumin human 25% IVPB 50 milliLiter(s) IV Intermittent once PRN see below  oxyCODONE    5 mG/acetaminophen 325 mG 1 Tablet(s) Oral every 4 hours PRN Moderate Pain (4 - 6)      Allergies    No Known Allergies    Intolerances        Review of Systems:    General:  No wt loss, fevers, chills, night sweats, fatigue   Eyes:  Good vision, no reported pain  ENT:  No sore throat, pain, runny nose, dysphagia  CV:  No pain, palpitations, hypo/hypertension  Resp:  No dyspnea, cough, tachypnea, wheezing  GI:  No pain, No nausea, No vomiting, No diarrhea, No constipation, No weight loss, No fever, No pruritis, No rectal bleeding, No melena, No dysphagia  :  No pain, bleeding, incontinence, nocturia  Muscle:  No pain, weakness  Neuro:  No weakness, tingling, memory problems  Psych:  No fatigue, insomnia, mood problems, depression  Endocrine:  No polyuria, polydypsia, cold/heat intolerance  Heme:  No petechiae, ecchymosis, easy bruisability  Skin:  No rash, tattoos, scars, edema      Vital Signs Last 24 Hrs  T(C): 37.7 (26 Apr 2018 08:03), Max: 37.7 (26 Apr 2018 08:03)  T(F): 99.8 (26 Apr 2018 08:03), Max: 99.8 (26 Apr 2018 08:03)  HR: 78 (26 Apr 2018 08:20) (78 - 118)  BP: 97/67 (26 Apr 2018 08:03) (91/60 - 132/77)  BP(mean): --  RR: 18 (26 Apr 2018 08:03) (16 - 18)  SpO2: 95% (26 Apr 2018 08:20) (94% - 98%)    PHYSICAL EXAM:    Constitutional: NAD, lying in bed  HEENT: EOMI, +scleral icterus  Neck: No LAD, supple  Respiratory: CTA   Cardiovascular: S1 and S2, irreg  Gastrointestinal: BS+, soft, NT +dt  Extremities: +mild le edema improving  Vascular: 2+ peripheral pulses  Neurological: A/O x 3  Skin: +jaundice      LABS:                        14.7   11.2  )-----------( 179      ( 26 Apr 2018 06:37 )             44.1     04-26    140  |  102  |  21  ----------------------------<  114<H>  4.1   |  27  |  1.30    Ca    8.4<L>      26 Apr 2018 06:37    TPro  6.4  /  Alb  2.6<L>  /  TBili  2.7<H>  /  DBili  x   /  AST  83<H>  /  ALT  44  /  AlkPhos  339<H>  04-26          RADIOLOGY & ADDITIONAL TESTS:  < from: US Paracentesis (04.25.18 @ 11:29) >    EXAM:  US BX LIVER#                          EXAM:  US PARACENTESIS [facility]                            PROCEDURE DATE:  04/25/2018          INTERPRETATION:  INDICATION: Ascites, liver mass, mediastinal adenopathy  COMPARISON:  Prior studies including CT dated 04/23/2018      PROCEDURE:  Risks and benefits of the procedure were discussed with the patient, and   informed consent was obtained.  Prior to any intervention, a "timeout"   was performed confirming both patient and procedure.  The patient was   placed supine on the stretcher.   Limited ultrasound examination of the   right lower quadrant  was performed and a suitable site for paracentesis   was marked.  The patient was prepped and draped in the usual sterile   manner. 1% lidocaine was administered for local anesthesia.    Under direct ultrasound guidance a 19-gauge Leaders2020eh needle was advanced into   the intra-abdominal ascites. Clear straw-colored fluid was readily   aspirated. The catheter was connected to wall suction.  A total of 1500   cc of straw-colored fluid was drained.      Attention was then turned to the right upper quadrant. Limited ultrasound   examination of the liver was performed and a permanent images acquired.   1% lidocaine was administered for local anesthesia.Using an intercostal   approach, a 17-gauge trocar needle was advanced under direct ultrasound   guidance into a dominant right hepatic lobe mass. Three 18-gauge core   biopsy specimens were obtained in the usual manner.  Gelfoam slurry was   administered via the cannula and the guiding needle removed. A sterile   dressing was applied.      The Yueh catheter use for paracentesis was subsequently removed and a   sterile dressing applied, thus terminating the procedure.    The patient tolerated theprocedure well without immediate complication.     FINDINGS:  Limited right lower quadrant ultrasound examination demonstrates a   moderate amount of intra-abdominal ascites.  There are no vessels or   intervening structures in the region of the percutaneous approach.    1500 cc of straw-colored  fluid was readily drained. A sample was sent   for further laboratory analysis.    Limited ultrasound examination of the liver redemonstrates a mass in the   right lobe, as seen on the recent CT. No significant intervening   structures were demonstrated along the percutaneous approach.    There is successful ultrasound-guided core biopsy of the dominant mass in   the right lobe of the liver.    IMPRESSION:  Successful ultrasound-guided paracentesis.  Successful ultrasound guided percutaneous biopsy of the right liver   lesion.                MARCELINO JUAREZ M.D., ATTENDING RADIOLOGIST  This document has been electronically signed. Apr 25 2018  2:06PM                < end of copied text >  < from: US Paracentesis (04.25.18 @ 11:29) >    EXAM:  US BX LIVER#                          EXAM:  US PARACENTESIS [facility]                            PROCEDURE DATE:  04/25/2018          INTERPRETATION:  INDICATION: Ascites, liver mass, mediastinal adenopathy  COMPARISON:  Prior studies including CT dated 04/23/2018      PROCEDURE:  Risks and benefits of the procedure were discussed with the patient, and   informed consent was obtained.  Prior to any intervention, a "timeout"   was performed confirming both patient and procedure.  The patient was   placed supine on the stretcher.   Limited ultrasound examination of the   right lower quadrant  was performed and a suitable site for paracentesis   was marked.  The patient was prepped and draped in the usual sterile   manner. 1% lidocaine was administered for local anesthesia.    Under direct ultrasound guidance a 19-gauge Yueh needle was advanced into   the intra-abdominal ascites. Clear straw-colored fluid was readily   aspirated. The catheter was connected to wall suction.  A total of 1500   cc of straw-colored fluid was drained.      Attention was then turned to the right upper quadrant. Limited ultrasound   examination of the liver was performed and a permanent images acquired.   1% lidocaine was administered for local anesthesia.Using an intercostal   approach, a 17-gauge trocar needle was advanced under direct ultrasound   guidance into a dominant right hepatic lobe mass. Three 18-gauge core   biopsy specimens were obtained in the usual manner.  Gelfoam slurry was   administered via the cannula and the guiding needle removed. A sterile   dressing was applied.      The Yueh catheter use for paracentesis was subsequently removed and a   sterile dressing applied, thus terminating the procedure.    The patient tolerated theprocedure well without immediate complication.     FINDINGS:  Limited right lower quadrant ultrasound examination demonstrates a   moderate amount of intra-abdominal ascites.  There are no vessels or   intervening structures in the region of the percutaneous approach.    1500 cc of straw-colored  fluid was readily drained. A sample was sent   for further laboratory analysis.    Limited ultrasound examination of the liver redemonstrates a mass in the   right lobe, as seen on the recent CT. No significant intervening   structures were demonstrated along the percutaneous approach.    There is successful ultrasound-guided core biopsy of the dominant mass in   the right lobe of the liver.    IMPRESSION:  Successful ultrasound-guided paracentesis.  Successful ultrasound guided percutaneous biopsy of the right liver   lesion.      < from: US Liver Biopsy (04.25.18 @ 11:29) >    EXAM:  US BX LIVER#                          EXAM:  US PARACENTESIS [facility]                            PROCEDURE DATE:  04/25/2018          INTERPRETATION:  INDICATION: Ascites, liver mass, mediastinal adenopathy  COMPARISON:  Prior studies including CT dated 04/23/2018      PROCEDURE:  Risks and benefits of the procedure were discussed with the patient, and   informed consent was obtained.  Prior to any intervention, a "timeout"   was performed confirming both patient and procedure.  The patient was   placed supine on the stretcher.   Limited ultrasound examination of the   right lower quadrant  was performed and a suitable site for paracentesis   was marked.  The patient was prepped and draped in the usual sterile   manner. 1% lidocaine was administered for local anesthesia.    Under direct ultrasound guidance a 19-gauge Yueh needle was advanced into   the intra-abdominal ascites. Clear straw-colored fluid was readily   aspirated. The catheter was connected to wall suction.  A total of 1500   cc of straw-colored fluid was drained.      Attention was then turned to the right upper quadrant. Limited ultrasound   examination of the liver was performed and a permanent images acquired.   1% lidocaine was administered for local anesthesia.Using an intercostal   approach, a 17-gauge trocar needle was advanced under direct ultrasound   guidance into a dominant right hepatic lobe mass. Three 18-gauge core   biopsy specimens were obtained in the usual manner.  Gelfoam slurry was   administered via the cannula and the guiding needle removed. A sterile   dressing was applied.      The Yueh catheter use for paracentesis was subsequently removed and a   sterile dressing applied, thus terminating the procedure.    The patient tolerated theprocedure well without immediate complication.     FINDINGS:  Limited right lower quadrant ultrasound examination demonstrates a   moderate amount of intra-abdominal ascites.  There are no vessels or   intervening structures in the region of the percutaneous approach.    1500 cc of straw-colored  fluid was readily drained. A sample was sent   for further laboratory analysis.    Limited ultrasound examination of the liver redemonstrates a mass in the   right lobe, as seen on the recent CT. No significant intervening   structures were demonstrated along the percutaneous approach.    There is successful ultrasound-guided core biopsy of the dominant mass in   the right lobe of the liver.    IMPRESSION:  Successful ultrasound-guided paracentesis.  Successful ultrasound guided percutaneous biopsy of the right liver   lesion.                MARCELINO JUAREZ M.D., ATTENDING RADIOLOGIST  This document has been electronically signed. Apr 25 2018  2:06PM                < end of copied text >            MARCELINO JUAREZ M.D., ATTENDING RADIOLOGIST  This document has been electronically signed. Apr 25 2018  2:06PM                < end of copied text >

## 2018-04-26 NOTE — PROGRESS NOTE ADULT - PROBLEM SELECTOR PLAN 4
primary or secondary  I spoke to dr hernandez and dr templeton this morning  s/p 1600ml thoracentesis yesterday

## 2018-04-26 NOTE — PROGRESS NOTE ADULT - PROBLEM SELECTOR PLAN 2
s/p bipap this morning  on oxygen  febrile  iv abxs  cultures  lung mass  primary or mets?  I spoke to wife at length

## 2018-04-26 NOTE — CONSULT NOTE ADULT - ATTENDING COMMENTS
71 yo M with Hep C, COPD, CHF, presenting with abnormal LFTs, found to have large liver mass, extensive mediastinal LAD, spiculated BRENT nodule concerning for malignancy, course now complicated by uncontrolled Afib and suspected septic shock.    --Afib better controlled on digoxin, dilt gtt off since hypotensive  hold lasix for now  suspected septic shock, wean levophed as tolerates, added midodrine  trial of albumin  --respiratory status improved with Afib control  BiPAP prn  COPD, cont duoneb  --mild DAVID, volume challenge as above  --empiric broad spectrum Abx with vanc, zosyn  f/u panCx  --future workup for BRENT mass and mediastinal LAD depending on clinical course and results of liver Bx  --discussed with family

## 2018-04-26 NOTE — CONSULT NOTE ADULT - ASSESSMENT
72m htn, + tobacco, here with jaundice  ascites, liver mass, lung mass  complicated by afib  portal vein thrombosis  fever

## 2018-04-26 NOTE — CHART NOTE - NSCHARTNOTEFT_GEN_A_CORE
Resident Rapid Response Note    Patient is a 72y old  Male who presents with a chief complaint of sob right upper quadrant pain for 2 weeks (23 Apr 2018 21:19)    Rapid response was called at 9:01AM on this 72y Male patient for desaturation to 70% and rapid A.fib. Patient seen and examined at bedside in acute respiratory distress. He was awake, alert and talking throughout RR. He complained of sob and chills/shivering, which started suddenly while he was laying comfortably. He     Patient was seen and examined at the bedside by the rapid response team and primary team.  ___ () at bedside.    Rapid Response Vital Signs:  BP:  HR:  RR:  SpO2: % on   Temp:  FS:    Vital Signs Last 24 Hrs  T(C): 37.7 (26 Apr 2018 08:03), Max: 37.7 (26 Apr 2018 08:03)  T(F): 99.8 (26 Apr 2018 08:03), Max: 99.8 (26 Apr 2018 08:03)  HR: 78 (26 Apr 2018 08:20) (78 - 118)  BP: 97/67 (26 Apr 2018 08:03) (91/60 - 132/77)  BP(mean): --  RR: 18 (26 Apr 2018 08:03) (16 - 18)  SpO2: 95% (26 Apr 2018 08:20) (94% - 98%)    Physical Exam:  General: Well developed, well nourished, NAD  HEENT: NCAT, PERRLA, EOMI bl, moist mucous membranes   Neck: Supple, nontender, no mass  Neurology: A&Ox3, nonfocal, CN II-XII grossly intact, sensation intact, no gait abnormalities   Respiratory: CTA B/L, No W/R/R  CV: RRR, +S1/S2, no murmurs, rubs or gallops  Abdominal: Soft, NT, ND +BSx4  Extremities: No C/C/E, + peripheral pulses  MSK: Normal ROM, no joint erythema or warmth, no joint swelling   Skin: warm, dry, normal color, no rash or abnormal lesions    LABS:                        14.7   11.2  )-----------( 179      ( 26 Apr 2018 06:37 )             44.1     26 Apr 2018 06:37    140    |  102    |  21     ----------------------------<  114    4.1     |  27     |  1.30     Ca    8.4        26 Apr 2018 06:37    TPro  6.4    /  Alb  2.6    /  TBili  2.7    /  DBili  x      /  AST  83     /  ALT  44     /  AlkPhos  339    26 Apr 2018 06:37        CAPILLARY BLOOD GLUCOSE      POCT Blood Glucose.: 87 mg/dL (26 Apr 2018 09:18)  POCT Blood Glucose.: 115 mg/dL (26 Apr 2018 05:46)    ABG - ( 26 Apr 2018 09:37 )  pH, Arterial: 7.43  pH, Blood: x     /  pCO2: 25    /  pO2: 260   / HCO3: 20    / Base Excess: -7.4  /  SaO2: 100                 RADIOLOGY & ADDITIONAL TESTS:      Assessment/Plan:      -Will continue to follow, RN to call if any changes. Resident Rapid Response Note    Patient is a 72y old  Male who presents with a chief complaint of sob right upper quadrant pain for 2 weeks (23 Apr 2018 21:19)    Rapid response was called at 9:01AM on this 72y Male patient for desaturation to 70% and rapid A.fib. Patient seen and examined at bedside in acute respiratory distress. He was awake, alert and talking throughout RR. He complained of sob and chills/shivering, which started suddenly while he was laying comfortably. He denied chest pain, palpitations, abdominal pain.     Patient was seen and examined at the bedside by the rapid response team and primary team. ICU LAEKSANDAR Cook and RRT at bedside.    Rapid Response Vital Signs:  BP: 128/78  HR: 110  RR: 52  SpO2: 100 % on ventimask  Temp: 98.4  FS: 87    Vital Signs Last 24 Hrs  T(C): 37.7 (26 Apr 2018 08:03), Max: 37.7 (26 Apr 2018 08:03)  T(F): 99.8 (26 Apr 2018 08:03), Max: 99.8 (26 Apr 2018 08:03)  HR: 78 (26 Apr 2018 08:20) (78 - 118)  BP: 97/67 (26 Apr 2018 08:03) (91/60 - 132/77)  BP(mean): --  RR: 18 (26 Apr 2018 08:03) (16 - 18)  SpO2: 95% (26 Apr 2018 08:20) (94% - 98%)    Physical Exam:  General: Elderly  male in acute respiratory distress, shivering, accessory muscles in use, well developed, well nourished   HEENT: NCAT  Neck: JVD   Neurology: A&Ox3, nonfocal, moving all extremities   Respiratory: coarse breath sounds bilaterally   CV: RRR, +S1/S2, no murmurs, rubs or gallops  Abdominal: Soft, NT, ND +BSx4, dressings from procedure on Right flank. the dressings are c/d/i  Extremities: No C/C/E, + peripheral pulses  MSK: Normal ROM, no joint erythema or warmth, no joint swelling   Skin: slightly cold, dry, normal color, no rash or abnormal lesions    LABS:                        14.7   11.2  )-----------( 179      ( 26 Apr 2018 06:37 )             44.1     26 Apr 2018 06:37    140    |  102    |  21     ----------------------------<  114    4.1     |  27     |  1.30     Ca    8.4        26 Apr 2018 06:37    TPro  6.4    /  Alb  2.6    /  TBili  2.7    /  DBili  x      /  AST  83     /  ALT  44     /  AlkPhos  339    26 Apr 2018 06:37        CAPILLARY BLOOD GLUCOSE  POCT Blood Glucose.: 87 mg/dL (26 Apr 2018 09:18)  POCT Blood Glucose.: 115 mg/dL (26 Apr 2018 05:46)    ABG - ( 26 Apr 2018 09:37 )  pH, Arterial: 7.43  pH, Blood: x     /  pCO2: 25    /  pO2: 260   / HCO3: 20    / Base Excess: -7.4  /  SaO2: 100         RADIOLOGY & ADDITIONAL TESTS:  EKG: Rapid a.fib   ABG: unchanged from prior       Assessment/Plan: 73 YO m with PMH hepatic vein thrombosis, Afib (diagnosed on admission), +hep C AB, cirrhosis with RUQ pain and hilomediastinal parenchymal lung and liver masses with ascites admitted 4/23 with dyspnea. Post paracentesis yesterday with removal of 1.6 L and biopsy. Now with RR called for SOB with desaturation to 70% on RA and Afib with RVR.    SOB, ?hypoxia (very difficult to obtain SpO2) vs. flash pulmonary edema, tachycardia Likely 2/2 to Afib with RVR vs MI  - SOB improved on ventimask but advanced to BiPAP as continues to be tachycardic and unable to obtain SpO2. Zofran x1 ordered for nausea   -CT angio of chest negative from previous RR negative for PE. Continue monitoring with continuous pulse ox.   - CXR, no significant changes from CXR from admission, unlikely PTX, awaiting official read  - For Dr. Atilio tijerina ordered Motrin and Tylenol   A.fib: Start Cardizem gtt and continue to give digoxin 0.25 mg IV loading dose given per Dr. Del Real' recommendation. Continue Metoprolol tartrate 50mg PO q6hrs. Continue telemetry monitoring. Cardiology, Dr. Courtney, following. Continue FD Lovenox 70mg SQ BID. f/u CBC, BMP, CE's. Patient accepted to ICU.   -12 lead EKG showed Atrial fibrillation, no significant changes from EKG from admission   -BP with manual cuff 98/58, stable from previous RR.   - Dr. Yue Gutierrez notified of acute event.   - Family at bedside  - ICU team to discuss advanced directives with patient   -Will continue to follow, RN to call if any changes.

## 2018-04-26 NOTE — CONSULT NOTE ADULT - PROBLEM SELECTOR RECOMMENDATION 2
? secondary to ETOH vs HCV  Check HCV VL and genotype  ETOH abstinance  check autoimmune markers
supportive care by icu  bipap supplemental oxyygen,

## 2018-04-26 NOTE — CHART NOTE - NSCHARTNOTEFT_GEN_A_CORE
Resident Rapid Response Note    Patient is a 72y old  Male who presents with a chief complaint of sob right upper quadrant pain for 2 weeks (23 Apr 2018 21:19)      Rapid response was called at on this 72y Male patient for hypoxia    Patient was seen and examined at the bedside by the rapid response team and primary team. Senior resident Dr. Kyle, ICU Javy HUERTAS at bedside. Dr. Mclean called and made aware.    Rapid Response Vital Signs:  BP:95/75  HR: 140  RR: 18  SpO2: 70% on 100 nonrebreather  Temp: 98.4  FS: 115    Vital Signs Last 24 Hrs  T(C): 36.8 (26 Apr 2018 04:35), Max: 36.9 (25 Apr 2018 23:54)  T(F): 98.3 (26 Apr 2018 04:35), Max: 98.5 (25 Apr 2018 23:54)  HR: 101 (26 Apr 2018 04:35) (93 - 118)  BP: 132/77 (26 Apr 2018 04:35) (91/60 - 132/77)  BP(mean): --  RR: 18 (26 Apr 2018 04:35) (16 - 18)  SpO2: 96% (26 Apr 2018 04:35) (95% - 98%)    Physical Exam:  General: Well developed, well nourished, NAD  HEENT: NCAT, PERRLA, EOMI bl, moist mucous membranes   Neck: Supple, nontender, no mass  Neurology: A&Ox4, nonfocal, CN II-XII grossly intact, sensation intact  Respiratory: Coarse breath sounds diffusely  CV: tachycardic irregular rhythm,  no murmurs, rubs or gallops  Abdominal: Soft, NT, ND +BSx4  Extremities: No C/C/E, + peripheral pulses  MSK: Normal ROM, no joint erythema or warmth, no joint swelling   Skin: warm, dry, normal color, no rash or abnormal lesions    LABS:      Ca    8.3        25 Apr 2018 06:04          CAPILLARY BLOOD GLUCOSE      POCT Blood Glucose.: 115 mg/dL (26 Apr 2018 05:46)    ABG - ( 26 Apr 2018 05:54 )  pH, Arterial: 7.46  pH, Blood: x     /  pCO2: 28    /  pO2: 358   / HCO3: 22    / Base Excess: -3.8  /  SaO2: 100               Assessment/Plan:  71 YO m with PMH hepatic vein thrombosis, Afib (diagnosed on admission), +hep C AB, cirrhosis with RUQ pain and hilomediastinal parenchymal lung and liver masses with ascites admitted 4/23 with dyspnea. Post paracentesis yesterday with removal of 1.6 L and biopsy. Now with RR called for SOB and hypoxia and Afib with RVR  Likely 2/2 to Afib with RVR vs PTX following paracentesis vs PE vs MI  12 lead EKG showed A flutter at 101, no significant changes from EKG from admission   500 cc LR   digoxin 0.25 mg     follow up stat CBC, BMP, cardiac enzymes  CXR ordered, no significant changes from CXR from admission, unlikely PTX, awaiting official read  Lovenox 70 mg BID ordered  follow up CTA       -Will continue to follow, RN to call if any changes.    Dr. Stokes  PGY-1 [phone] Resident Rapid Response Note    Patient is a 72y old  Male who presents with a chief complaint of sob right upper quadrant pain for 2 weeks (23 Apr 2018 21:19)      Rapid response was called at on this 72y Male patient for hypoxia    Patient was seen and examined at the bedside by the rapid response team and primary team. Senior resident Dr. Kyle, ICU Javy HUERTAS at bedside. Dr. Mclean called and made aware.    Rapid Response Vital Signs:  BP:95/75  HR: 140  RR: 18  SpO2: 70% on 100 nonrebreather  Temp: 98.4  FS: 115    Vital Signs Last 24 Hrs  T(C): 36.8 (26 Apr 2018 04:35), Max: 36.9 (25 Apr 2018 23:54)  T(F): 98.3 (26 Apr 2018 04:35), Max: 98.5 (25 Apr 2018 23:54)  HR: 101 (26 Apr 2018 04:35) (93 - 118)  BP: 132/77 (26 Apr 2018 04:35) (91/60 - 132/77)  BP(mean): --  RR: 18 (26 Apr 2018 04:35) (16 - 18)  SpO2: 96% (26 Apr 2018 04:35) (95% - 98%)    Physical Exam:  General: Well developed, well nourished, NAD  HEENT: NCAT, PERRLA, EOMI bl, moist mucous membranes   Neck: Supple, nontender, no mass  Neurology: A&Ox4, nonfocal, CN II-XII grossly intact, sensation intact  Respiratory: Coarse breath sounds diffusely  CV: tachycardic irregular rhythm,  no murmurs, rubs or gallops  Abdominal: Soft, NT, ND +BSx4  Extremities: No C/C/E, + peripheral pulses  MSK: Normal ROM, no joint erythema or warmth, no joint swelling   Skin: warm, dry, normal color, no rash or abnormal lesions    LABS:      Ca    8.3        25 Apr 2018 06:04          CAPILLARY BLOOD GLUCOSE      POCT Blood Glucose.: 115 mg/dL (26 Apr 2018 05:46)    ABG - ( 26 Apr 2018 05:54 )  pH, Arterial: 7.46  pH, Blood: x     /  pCO2: 28    /  pO2: 358   / HCO3: 22    / Base Excess: -3.8  /  SaO2: 100               Assessment/Plan:  73 YO m with PMH hepatic vein thrombosis, Afib (diagnosed on admission), +hep C AB, cirrhosis with RUQ pain and hilomediastinal parenchymal lung and liver masses with ascites admitted 4/23 with dyspnea. Post paracentesis yesterday with removal of 1.6 L and biopsy. Now with RR called for SOB and hypoxia and Afib with RVR  Likely 2/2 to Afib with RVR vs PTX following paracentesis vs PE vs MI  12 lead EKG showed A flutter at 101, no significant changes from EKG from admission   500 cc LR   digoxin 0.25 mg   cardizem 10 mg IV push given    follow up stat CBC, BMP, cardiac enzymes  CXR ordered, no significant changes from CXR from admission, unlikely PTX, awaiting official read  Lovenox 70 mg BID ordered  follow up CTA       -Will continue to follow, RN to call if any changes.    Dr. Donis  PGY-1 [phone] Resident Rapid Response Note    Patient is a 72y old  Male who presents with a chief complaint of sob right upper quadrant pain for 2 weeks (23 Apr 2018 21:19)      Rapid response was called at on this 72y Male patient for hypoxia    Patient was seen and examined at the bedside by the rapid response team and primary team. Senior resident Dr. Kyle, ICU Javy HUERTAS at bedside. Dr. Mclean called and made aware.  Patient reports he was sitting up in bed, suddenly felt SOB, RN took vitals and could not obtain pulse ox, HR in 150s, placed on ventimask, SOB improved, but pulse ox still not reading, RR called.     Rapid Response Vital Signs:  BP:95/75  HR: 140  RR: 18  SpO2: 70% on 100 nonrebreather  Temp: 98.4  FS: 115    Vital Signs Last 24 Hrs  T(C): 36.8 (26 Apr 2018 04:35), Max: 36.9 (25 Apr 2018 23:54)  T(F): 98.3 (26 Apr 2018 04:35), Max: 98.5 (25 Apr 2018 23:54)  HR: 101 (26 Apr 2018 04:35) (93 - 118)  BP: 132/77 (26 Apr 2018 04:35) (91/60 - 132/77)  BP(mean): --  RR: 18 (26 Apr 2018 04:35) (16 - 18)  SpO2: 96% (26 Apr 2018 04:35) (95% - 98%)    Physical Exam:  General: Well developed, well nourished, NAD  HEENT: NCAT, PERRLA, EOMI bl, moist mucous membranes   Neck: Supple, nontender, no mass  Neurology: A&Ox4, nonfocal, CN II-XII grossly intact, sensation intact  Respiratory: Coarse breath sounds diffusely  CV: tachycardic irregular rhythm,  no murmurs, rubs or gallops  Abdominal: Soft, NT, ND +BSx4  Extremities: No C/C/E, + peripheral pulses  MSK: Normal ROM, no joint erythema or warmth, no joint swelling   Skin: warm, dry, normal color, no rash or abnormal lesions    LABS:      Ca    8.3        25 Apr 2018 06:04          CAPILLARY BLOOD GLUCOSE      POCT Blood Glucose.: 115 mg/dL (26 Apr 2018 05:46)    ABG - ( 26 Apr 2018 05:54 )  pH, Arterial: 7.46  pH, Blood: x     /  pCO2: 28    /  pO2: 358   / HCO3: 22    / Base Excess: -3.8  /  SaO2: 100               Assessment/Plan:  71 YO m with PMH hepatic vein thrombosis, Afib (diagnosed on admission), +hep C AB, cirrhosis with RUQ pain and hilomediastinal parenchymal lung and liver masses with ascites admitted 4/23 with dyspnea. Post paracentesis yesterday with removal of 1.6 L and biopsy. Now with RR called for SOB and hypoxia and Afib with RVR, HR 150s.   -SOB, ?hypoxia (poor read), hypotension, tachycardia Likely 2/2 to Afib with RVR vs PTX following paracentesis vs PE (pt off AC for 2 days for procedure) vs MI  -given cardizem 10 mg IV push given, HR improved to 110s  -12 lead EKG showed A flutter at 101, no significant changes from EKG from admission   -given 500 cc LR   -digoxin 0.25 mg IV loading dose given per Dr. Quezada's recommendation      follow up stat CBC, BMP, cardiac enzymes  CXR ordered, no significant changes from CXR from admission, unlikely PTX, awaiting official read  Lovenox 70 mg BID ordered  follow up CTA     -Will continue to follow, RN to call if any changes.    Dr. Donis  PGY-1 [phone]

## 2018-04-26 NOTE — PROGRESS NOTE ADULT - PROBLEM SELECTOR PLAN 2
-? secondary to ETOH vs HCV  -HCV ab reactive, HCV RNA +, viral load [id number],  f/u genotype  -f/u autoimmune labs- pending  -ETOH abstinence

## 2018-04-26 NOTE — CONSULT NOTE ADULT - ASSESSMENT
Patient is a 71 yo male pmhx CHF, COPD, Hep C, former smoker and Hip arthritis was sent to ED by PCP with complaints of elevated bilirubin and liver enzymes admitted for workup of liver and lung masses, with hospital course complicated by afib with rvr.      NEURO: No active issues.   CV: Sepsis, febrile, rectal temp performed upon arrival to ICU temp: 104.4F.  Antipyretics, Pan culture, broad spectrum abx.  Afib with rvr, started on cardizem gtt, Digoxin iv,   RESP: Respiratory distress of unknown etiology, started on BiPAP 12/5 FiO2 40%, Keep HOB >30 degrees, titrate FiO2 for sPO2 >88%.  COPD continue duonebs.   RENAL:   GI:   ENDO:  ID: Septic, pan culture, antipyretics, cooling blanket, Broad spectrum abx.    HEME:   DISPO: Patient is a 71 yo male pmhx CHF, COPD, Hep C, former smoker and Hip arthritis was sent to ED by PCP with complaints of elevated bilirubin and liver enzymes admitted for workup of liver and lung masses, with hospital course complicated by afib with rvr.      NEURO: No active issues.   CV: Sepsis, febrile, rectal temp performed upon arrival to ICU temp: 104.4F.  Antipyretics, Pan culture, broad spectrum abx.  Afib with rvr, started on cardizem gtt, Digoxin iv.  Continue Lasix as per cardiology.   RESP: Respiratory distress of unknown etiology, started on BiPAP 12/5 FiO2 40%, Keep HOB >30 degrees, titrate FiO2 for sPO2 >88%.  COPD continue duonebs.   RENAL: DAVID, Avoid nephrotoxic medications. Renally dose medications. Trend urine output, BUN/Cr and Electrolytes, replace as needed.    GI: Elevated transaminase, Liver Mass s/p biopsy, results pending.     ENDO:   ID: Septic, pan culture, antipyretics, cooling blanket, Broad spectrum abx.    HEME:   DISPO: Patient is a 71 yo male pmhx CHF, COPD, Hep C, former smoker and Hip arthritis was sent to ED by PCP with complaints of elevated bilirubin and liver enzymes admitted for workup of liver and lung masses, with hospital course complicated by afib with rvr.      NEURO: No active issues.   CV: Sepsis, febrile, rectal temp performed upon arrival to ICU temp: 104.4F.  Antipyretics, Pan culture, broad spectrum abx.  Afib with rvr, started on cardizem gtt, Digoxin iv.  Continue Lasix as per cardiology.   RESP: Respiratory distress of unknown etiology, started on BiPAP 12/5 FiO2 40%, Keep HOB >30 degrees, titrate FiO2 for sPO2 >88%.  COPD continue duonebs.   RENAL: DAVID, Avoid nephrotoxic medications. Renally dose medications. Trend urine output, BUN/Cr and Electrolytes, replace as needed.    GI: Cirrhosis, Elevated transaminase, Liver Mass s/p biopsy, results pending.     ENDO: No active issues.   ID: Septic, pan culture, antipyretics, cooling blanket, Broad spectrum abx.  ID consulted.   HEME: No active issues.   DISPO: Full Code.

## 2018-04-26 NOTE — PROGRESS NOTE ADULT - SUBJECTIVE AND OBJECTIVE BOX
VITALS/LABS  4/26/2018 998 94 97/67 18 94% 68.4   4/26/2018 W 6.9 Hb 14.1 Plt 179 Na 140 K 4.2 CO2 21 Cr 1,5     REVIEW OF SYMPTOMS    Able to give ROS  Yes     RELIABLE No   CONSTITUTIONAL Weakness Yes  Chills No Vision changes No  ENDOCRINE No unexplained hair loss No heat or cold intolerance    ALLERGY No hives  Sore throat No   RESP Coughing blood no  Shortness of breath YES   NEURO No Headache  Confusion Pain neck No   CARDIAC No Chest pain No Palpitations   GI No Pain abdomen NO   Vomiting NO     PHYSICAL EXAM    HEENT Unremarkable PERRLA atraumatic   RESP Fair air entry EXP prolonged    Harsh breath sound Resp distres mild   CARDIAC S1 S2 No S3     NO JVD    ABDOMEN SOFT BS PRESENT NOT DISTENDED No hepatosplenomegaly PEDAL EDEMA present No calf tenderness  NO rash   GENERAL Not TOXIC looking    GLOBAL ISSUE/BEST PRACTICE:        PROBLEM: Analgesia:     na                        PROBLEM: Sedation:     na               PROBLEM: HOB elevation:   y            PROBLEM: Stress ulcer proph:    na                      PROBLEM: VTE prophylaxis:      Lvnx 70.2 (4/23)   PROBLEM: Glycemic control:    na  PROBLEM: Nutrition:    reg diet (4/23)   PROBLEM: Advanced directive: na     PROBLEM: Allergies:  na      PROBLEM BASES ASSESSMENT PLAN 4/23/2018 ADMISSION [facility] P   72 m with hilomediastinal parenchymal lung and liver masses with ascites admitted 4/23 with dyspnea  Likely has metaastatic hcc Prognosis grave   Pt had CTNB liver and paracentesis 4/25  CTNB lung mass  option explored 4/26 with Dr Dodge is not safely accessible  Pt has AFRVR and seems too unatabe for bronch at this point (dw DR Mclean on 4/26 am  DYSPNEA   4/26/2018 100% 743/25/260   4/24 RA 96%   4/23 Check echo ro chf check v duplex legs ro dvt   4/23 O2 Monitor po  4/24 V duplex legs n   COPD  Duoneb.3 (4/23)     AF RVR   4/24 Cardio plans maximizing bb and if hemodynamic instability then DCCV   4/24 Pt on ac for pvt   RO MI   4/23 Tr 1 n.2   Metoprolol 50.4 (4/25)   CHF  4/24/2018 ECHO ef 50% Mod mr rvsp 20 large bl effsns   Lasix 40 (4/23)   HILOMEDIASTINAL PARENCHYMAL LUNG MASS AND LIVER MASS WITH ASCITES  4/25 Recd call from [facility] that they need lung bx to determine wheher pt has second primary Willdw Dr Dodge Bronch would  be dewayne risky in setting of cardiac problems   4/25 Pt had liver bx as well as paracentesis Results pending   4/23 cea 10.6   4/23 afp 51.5   4/23 Suggest paracentesis or liver biopsy before pulmonary invasive testing  4/24 case dw Dr Dodge Plan is for IR to do nb of liver and paracentesis     PORTAL VEIN THROMBOSIS   Lvnx 70.2 (4/23)   HEP C   4/23 Hep C ab pos     TIME SPENT Over 25 minutes aggregate care time spent on encounter; activities included   direct patient care, counseling and/or coordinating care reviewing notes, lab data/ imaging , discussion with multidisciplinary team/ patient  /family. Risks, benefits, alternatives  discussed in detail. Proper antibiotic use issues addressed Questions/concerns  were addressed  as  best as possible As applicable to this patient the following issues were addressed Pain was  assessed and addresed.Pt was screened for signs of clinical depression .Appropriate referral made.Risk of falls assessed.Fall prevention d/w family .Pt was screened for tobacco and etoh,counseling was done for etoh and tobacco cessation as applicable .Use of narcotic pain meds was discussed as applicable including  to use narcotic meds  wisely and to avoid overusing them

## 2018-04-26 NOTE — PROGRESS NOTE ADULT - SUBJECTIVE AND OBJECTIVE BOX
PAST MEDICAL & SURGICAL HISTORY:  Hip arthritis: lt  Hypertension  No significant past surgical history      MEDICATIONS  (STANDING):  ALBUTerol/ipratropium for Nebulization 3 milliLiter(s) Nebulizer every 8 hours  digoxin  Injectable 0.25 milliGRAM(s) IV Push every 6 hours  diltiazem Infusion 10 mG/Hr (10 mL/Hr) IV Continuous <Continuous>  enoxaparin Injectable 70 milliGRAM(s) SubCutaneous every 12 hours  furosemide    Tablet 40 milliGRAM(s) Oral daily  metoprolol tartrate 50 milliGRAM(s) Oral every 6 hours  pantoprazole    Tablet 40 milliGRAM(s) Oral before breakfast  piperacillin/tazobactam IVPB. 3.375 Gram(s) IV Intermittent every 8 hours  potassium chloride    Tablet ER 10 milliEquivalent(s) Oral daily  senna 2 Tablet(s) Oral at bedtime  vancomycin  IVPB 1000 milliGRAM(s) IV Intermittent once    MEDICATIONS  (PRN):  acetaminophen   Tablet. 325 milliGRAM(s) Oral every 4 hours PRN Mild Pain (1 - 3)  albumin human 25% IVPB 50 milliLiter(s) IV Intermittent once PRN see below  oxyCODONE    5 mG/acetaminophen 325 mG 1 Tablet(s) Oral every 4 hours PRN Moderate Pain (4 - 6)      Patient is a 72y old  Male who presents with a chief complaint of sob right upper quadrant pain for 2 weeks (23 Apr 2018 21:19)      Vital Signs Last 24 Hrs  T(C): 37.7 (26 Apr 2018 08:03), Max: 37.7 (26 Apr 2018 08:03)  T(F): 99.8 (26 Apr 2018 08:03), Max: 99.8 (26 Apr 2018 08:03)  HR: 154 (26 Apr 2018 10:12) (78 - 154)  BP: 108/75 (26 Apr 2018 10:12) (91/60 - 132/77)  BP(mean): --  RR: 23 (26 Apr 2018 10:12) (16 - 23)  SpO2: 95% (26 Apr 2018 08:20) (94% - 98%)          04-25 @ 07:01  -  04-26 @ 07:00  --------------------------------------------------------  IN: 300 mL / OUT: 100 mL / NET: 200 mL    04-26 @ 07:01 - 04-26 @ 10:40  --------------------------------------------------------  IN: 200 mL / OUT: 0 mL / NET: 200 mL        REVIEW OF SYSTEMS:    Constitutional: No fever, weight loss or fatigue  Eyes: No eye pain, visual disturbances, or discharge  ENT:  No difficulty hearing, tinnitus, vertigo; No sinus or throat pain  Neck: No pain or stiffness  Breasts: No pain, masses or nipple discharge  Respiratory: No cough, wheezing, chills or hemoptysis  Cardiovascular: No chest pain, palpitations, shortness of breath, dizziness or leg swelling  Gastrointestinal: No abdominal or epigastric pain. No nausea, vomiting or hematemesis; No diarrhea or constipation. No melena or hematochezia.  Genitourinary: No dysuria, frequency, hematuria or incontinence  Rectal: No pain, hemorrhoids or incontinence  Neurological: No headaches, memory loss, loss of strength, numbness or tremors  Skin: No itching, burning, rashes or lesions   Lymph Nodes: No enlarged glands  Endocrine: No heat or cold intolerance; No hair loss  Musculoskeletal: No joint pain or swelling; No muscle, back or extremity pain  Psychiatric: No depression, anxiety, mood swings or difficulty sleeping  Heme/Lymph: No easy bruising or bleeding gums  Allergy and Immunologic: No hives or eczema    PHYSICAL EXAM:  temp 101,,taken off bipap  tachycardic at 160  received digoxin ivp x2  received cardizem iv on drip  bolus fluids at first rapid  then lasix ivp  s/p lovenox  dr thakkar at bedside  transferred to icu  wife at bedside  Constitutional: as above  HEENT: PERRLA, EOMI, Normal Hearing,   Neck: No LAD, No JVD  Back: Normal spine flexure, No CVA tenderness  Respiratory: dec bs bl   Cardiovascular: tachy irregular  Gastrointestinal: BS+, soft, NT/ND  Extremities:pitting edema +1,Vascular  Neurological: Alert but lethargic,arousable,,responds to questions  Skin: No rashes      decubiti: none                          14.1   6.9   )-----------( 179      ( 26 Apr 2018 09:57 )             44.0     04-26    140  |  103  |  21  ----------------------------<  73  4.2   |  21<L>  |  1.50<H>    Ca    8.3<L>      26 Apr 2018 09:57  Phos  2.9     04-26  Mg     1.8     04-26    TPro  6.4  /  Alb  2.5<L>  /  TBili  3.0<H>  /  DBili  x   /  AST  100<H>  /  ALT  42  /  AlkPhos  367<H>  04-26        CARDIAC MARKERS ( 26 Apr 2018 09:57 )  .032 ng/mL / x     / 94 U/L / x     / 1.3 ng/mL  CARDIAC MARKERS ( 26 Apr 2018 06:37 )  <.015 ng/mL / x     / 72 U/L / x     / 1.4 ng/mL  CARDIAC MARKERS ( 24 Apr 2018 13:36 )  <.015 ng/mL / x     / x     / x     / x          04-26 @ 09:57    TSH: --  B12:  --   Folate: --   GUALBERTO: --   Rheumatoid: --   Ammonia:  --   CPK:  1.4  Total PSA:  --  Free PSA: --  Cortisol: --  C-Diff:  --  BNP:  --  SPEP: --  Troponin:  .032  CKMB: --  Valproic acid level:  --  tegretol level: --  dilantin level:  --  phenobarb level: --  keppra level:  --  04-26 @ 06:37    TSH: --  B12:  --   Folate: --   GUALBERTO: --   Rheumatoid: --   Ammonia:  --   CPK:  1.9  Total PSA:  --  Free PSA: --  Cortisol: --  C-Diff:  --  BNP:  --  SPEP: --  Troponin:  <.015  CKMB: --  Valproic acid level:  --  tegretol level: --  dilantin level:  --  phenobarb level: --  keppra level:  --    Peritoneal Peritoneal Fluid  04-25 @ 16:51 --  --    No polymorphonuclear cells seen per low power field  No organisms seen per oil power field  by cytocentrifuge              Radiology:

## 2018-04-26 NOTE — PROGRESS NOTE ADULT - SUBJECTIVE AND OBJECTIVE BOX
[facility] Cardiology    CHIEF COMPLAINT: Patient is a 72y old  Male who presents with a chief complaint of sob right upper quadrant pain for 2 weeks (23 Apr 2018 21:19)      Follow Up: [ ] Chest Pain      [ ] Dyspnea     [ ] Palpitations    [ ] Atrial Fibrillation     [ ] Ventricular Dysrhythmia    [ ] Abnormal EKG                      [ ] Abnormal Cardiac Enzymes     [ ] Valvular Disease    HPI:  patient came to the office for ruq pain  sob for 1 week  saw dr morse friday,lft mildly elevated,was told to be seen  patient has refused all preventative w/u in the past,except labs and bp medications  refused cxr/colonoscopy  when he came in today,i knew there was a problem,,he usually will come only once a year  he had ruq pain,hepatomegally,leg edema 2+  i sent him to the er,,i explained it may be cancer primarily or simpler like acute choly/pancreatitis  girl friend was present (23 Apr 2018 21:19)    Rapid response in progress for SOB, Rapid AF, hypoxia  pt started on bipap  EKG with rapid AF and lateral ST dep  Pt denies CP or pressure    s/p liver bx and paracentesis  s/p CT PE last night, neg for PE, positive for RUQ spiculated lung mass/neoplasm    PAST MEDICAL & SURGICAL HISTORY:  Hip arthritis: lt  Hypertension  No significant past surgical history      MEDICATIONS  (STANDING):  ALBUTerol/ipratropium for Nebulization 3 milliLiter(s) Nebulizer every 8 hours  digoxin  Injectable 0.25 milliGRAM(s) IV Push every 6 hours  diltiazem Infusion 10 mG/Hr (10 mL/Hr) IV Continuous <Continuous>  enoxaparin Injectable 70 milliGRAM(s) SubCutaneous every 12 hours  furosemide    Tablet 40 milliGRAM(s) Oral daily  ibuprofen  Suspension 600 milliGRAM(s) Oral once  metoprolol tartrate 50 milliGRAM(s) Oral every 6 hours  pantoprazole    Tablet 40 milliGRAM(s) Oral before breakfast  piperacillin/tazobactam IVPB. 3.375 Gram(s) IV Intermittent every 8 hours  potassium chloride    Tablet ER 10 milliEquivalent(s) Oral daily  senna 2 Tablet(s) Oral at bedtime  vancomycin  IVPB 1000 milliGRAM(s) IV Intermittent once    MEDICATIONS  (PRN):  acetaminophen   Tablet 325 milliGRAM(s) Oral every 4 hours PRN For Temp greater than 38 C (100.4 F)  acetaminophen   Tablet. 325 milliGRAM(s) Oral every 4 hours PRN Mild Pain (1 - 3)  albumin human 25% IVPB 50 milliLiter(s) IV Intermittent once PRN see below  oxyCODONE    5 mG/acetaminophen 325 mG 1 Tablet(s) Oral every 4 hours PRN Moderate Pain (4 - 6)      Allergies    No Known Allergies    Intolerances        REVIEW OF SYSTEMS:    CONSTITUTIONAL: ill  EYES/ENT: No visual changes;  No vertigo or throat pain   NECK: No pain or stiffness  RESPIRATORY: No cough, wheezing, hemoptysis; positive shortness of breath  CARDIOVASCULAR: No chest pain or palpitations  GASTROINTESTINAL: No abdominal or epigastric pain. No nausea, vomiting, or hematemesis; No diarrhea or constipation. No melena or hematochezia.  GENITOURINARY: No dysuria, frequency or hematuria  NEUROLOGICAL: weakness  SKIN: No itching, burning, rashes, or lesions   All other review of systems is negative unless indicated above    Vital Signs Last 24 Hrs  T(C): 37.7 (26 Apr 2018 08:03), Max: 37.7 (26 Apr 2018 08:03)  T(F): 99.8 (26 Apr 2018 08:03), Max: 99.8 (26 Apr 2018 08:03)  HR: 78 (26 Apr 2018 08:20) (78 - 118)  BP: 97/67 (26 Apr 2018 08:03) (91/60 - 132/77)  BP(mean): --  RR: 18 (26 Apr 2018 08:03) (16 - 18)  SpO2: 95% (26 Apr 2018 08:20) (94% - 98%)    I&O's Summary    25 Apr 2018 07:01  -  26 Apr 2018 07:00  --------------------------------------------------------  IN: 300 mL / OUT: 100 mL / NET: 200 mL    26 Apr 2018 07:01  -  26 Apr 2018 09:52  --------------------------------------------------------  IN: 200 mL / OUT: 0 mL / NET: 200 mL        PHYSICAL EXAM:    Constitutional: acute distress, awake, alert  Neurological: Alert,   HEENT: no JVD, EOMI  Cardiovascular: irr, S1 and S2, tachy  Pulmonary: breath sounds bilaterally rhonchi  Gastrointestinal: Bowel Sounds present, soft, nontender  EXT:  peripheral edema  Skin: No rashes.  Psych:  Mood & affect appropriate    LABS: All Labs Reviewed:                        14.7   11.2  )-----------( 179      ( 26 Apr 2018 06:37 )             44.1     04-26    140  |  102  |  21  ----------------------------<  114<H>  4.1   |  27  |  1.30    Ca    8.4<L>      26 Apr 2018 06:37    TPro  6.4  /  Alb  2.6<L>  /  TBili  2.7<H>  /  DBili  x   /  AST  83<H>  /  ALT  44  /  AlkPhos  339<H>  04-26      CARDIAC MARKERS ( 26 Apr 2018 06:37 )  <.015 ng/mL / x     / 72 U/L / x     / 1.4 ng/mL  CARDIAC MARKERS ( 24 Apr 2018 13:36 )  <.015 ng/mL / x     / x     / x     / x          Blood Culture: Organism --  Gram Stain Blood -- Gram Stain   No polymorphonuclear cells seen per low power field  No organisms seen per oil power field  by cytocentrifuge  Specimen Source Peritoneal Peritoneal Fluid  Culture-Blood --      04-24 @ 06:07  Pro Bnp [id number]  04-23 @ 14:30  Pro Bnp [id number]    · Assessment		  72M getting work up for severe left Hip OA and replacement found with elevated liver chemistries, Right lower costal pain, with sono noted for liver mass, portal vein thrombosis. CT in hospital shows lung masses in addition to liver mass and ascites.  Afib with better rapid rate, relatively new onset (was in NSR 3/13/2018), chronic edema Acute on chronic diastolic HF (HFpEF), diffuse ST and T abnormalities on EKG, could be ischemic changes. These were noted before.      AFIB  -  still with rapid ventricular rates prolonged up to 110-130s.  On metoprolol to 50 mg q6h with hold parameters.  Limited by low BP with systolics in the high 90s to low 100.    PLAN: Add digoxin 0.25 IV q 6 hours X 3 doses then start dig 0.125 mg PO qday  Add cardizem drip at 10 mg/hr  Admit to ICU  No Amio sec to cirrhosis    Troponin negative for MI this far  Echo with rapid heart rates and possible anterior wall hypokinesis with EF 50-55%.  continue asa 81 and metop    Edema Ascites Pleural effusion/ CHF?/cirrhosis  - chronic edema despite lasix.  Hold lasix now sec to lowish BP.    Awaiting work up of liver mass and portal vein thrombosis.  Eventual lung bx, too unstable to go today. Lovenox if no plan for Lung bx tomorrow    Overall guarded prognosis.

## 2018-04-26 NOTE — PROGRESS NOTE ADULT - SUBJECTIVE AND OBJECTIVE BOX
Interval History: above noted. transferred to ICU for rapid and diagnosed with atrial fibrillation  lung biopsy cancelled    Chart reviewed and events noted;   Overnight events:    MEDICATIONS  (STANDING):  ALBUTerol/ipratropium for Nebulization 3 milliLiter(s) Nebulizer every 8 hours  digoxin  Injectable 0.25 milliGRAM(s) IV Push every 6 hours  diltiazem Infusion 10 mG/Hr (10 mL/Hr) IV Continuous <Continuous>  enoxaparin Injectable 70 milliGRAM(s) SubCutaneous every 12 hours  furosemide    Tablet 40 milliGRAM(s) Oral daily  metoprolol tartrate 50 milliGRAM(s) Oral every 6 hours  pantoprazole    Tablet 40 milliGRAM(s) Oral before breakfast  piperacillin/tazobactam IVPB. 3.375 Gram(s) IV Intermittent every 8 hours  potassium chloride    Tablet ER 10 milliEquivalent(s) Oral daily  senna 2 Tablet(s) Oral at bedtime    MEDICATIONS  (PRN):  albumin human 25% IVPB 50 milliLiter(s) IV Intermittent once PRN see below  oxyCODONE    5 mG/acetaminophen 325 mG 1 Tablet(s) Oral every 4 hours PRN Moderate Pain (4 - 6)      Vital Signs Last 24 Hrs  T(C): 40.2 (26 Apr 2018 10:36), Max: 40.2 (26 Apr 2018 10:36)  T(F): 104.4 (26 Apr 2018 10:36), Max: 104.4 (26 Apr 2018 10:36)  HR: 153 (26 Apr 2018 11:00) (78 - 161)  BP: 93/64 (26 Apr 2018 11:00) (91/60 - 132/77)  BP(mean): 74 (26 Apr 2018 11:00) (74 - 83)  RR: 34 (26 Apr 2018 11:00) (16 - 37)  SpO2: 96% (26 Apr 2018 11:00) (94% - 98%)    PHYSICAL EXAM  General: adult in NAD  HEENT: clear oropharynx, anicteric sclera, pink conjunctivae  Neck: supple  CV: normal S1S2 with no murmur rubs or gallops  Lungs: clear to auscultation, no wheezes, no rhales  Abdomen: soft non-tender non-distended, no hepato/splenomegaly  Ext: no clubbing cyanosis or edema  Skin: no rashes and no petichiae  Neuro: alert and oriented X3 no focal deficits      LABS:  CBC Full  -  ( 26 Apr 2018 09:57 )  WBC Count : 6.9 K/uL  Hemoglobin : 14.1 g/dL  Hematocrit : 44.0 %  Platelet Count - Automated : 179 K/uL  Mean Cell Volume : 91.6 fl  Mean Cell Hemoglobin : 29.3 pg  Mean Cell Hemoglobin Concentration : 32.0 gm/dL  Auto Neutrophil # : x  Auto Lymphocyte # : x  Auto Monocyte # : x  Auto Eosinophil # : x  Auto Basophil # : x  Auto Neutrophil % : 83.0 %  Auto Lymphocyte % : 8.0 %  Auto Monocyte % : 1.0 %  Auto Eosinophil % : x  Auto Basophil % : x    04-26    140  |  103  |  21  ----------------------------<  73  4.2   |  21<L>  |  1.50<H>    Ca    8.3<L>      26 Apr 2018 09:57  Phos  2.9     04-26  Mg     1.8     04-26    TPro  6.4  /  Alb  2.5<L>  /  TBili  3.0<H>  /  DBili  x   /  AST  100<H>  /  ALT  42  /  AlkPhos  367<H>  04-26        fe studies  Iron - Total Binding Capacity.: 213 ug/dL (04-25 @ 07:46)  Ferritin, Serum: 452 ng/mL (04-24 @ 07:43)      WBC trend  6.9 K/uL (04-26-18 @ 09:57)  11.2 K/uL (04-26-18 @ 06:37)  11.3 K/uL (04-25-18 @ 06:04)  13.7 K/uL (04-23-18 @ 14:30)      Hgb trend  14.1 g/dL (04-26-18 @ 09:57)  14.7 g/dL (04-26-18 @ 06:37)  13.3 g/dL (04-25-18 @ 06:04)  13.8 g/dL (04-23-18 @ 14:30)      plt trend  179 K/uL (04-26-18 @ 09:57)  179 K/uL (04-26-18 @ 06:37)  152 K/uL (04-25-18 @ 06:04)  182 K/uL (04-23-18 @ 14:30)        RADIOLOGY & ADDITIONAL STUDIES:

## 2018-04-26 NOTE — CHART NOTE - NSCHARTNOTEFT_GEN_A_CORE
Resident Rapid Response FOLLOW UP Note    Patient is a 72y old  Male who presents with a chief complaint of sob right upper quadrant pain for 2 weeks (23 Apr 2018 21:19)    Rapid response was called at on this 72y Male patient for hypoxia    Patient was seen and examined at the bedside by the rapid response team and primary team. Senior resident Dr. Kyle, ICU PA, Javy Salgado at bedside. Dr. Mclean called and made aware.  Patient reports he was sitting up in bed, suddenly felt SOB, RN took vitals and could not obtain pulse ox, HR in 150s, placed on ventimask, SOB improved, but pulse ox still not reading, RR called.     FOLLOW UP: Patient was seen and examined lying comfortably in bed in NAD. He was off respiratory support, saturating 94% on RA. He denied fevers, chills, sob, chest pain, abdominal pain. On telemetry monitoring, patient continues to be in A.fib, intermittent episodes of rapid with 's but HR ranging from 100-140's.     Rapid Response Vital Signs:  BP:95/75  HR: 140  RR: 18  SpO2: 70% on 100 nonrebreather  Temp: 98.4  FS: 115    Vital Signs Last 24 Hrs  T(C): 37.7 (26 Apr 2018 08:03), Max: 37.7 (26 Apr 2018 08:03)  T(F): 99.8 (26 Apr 2018 08:03), Max: 99.8 (26 Apr 2018 08:03)  HR: 94 (26 Apr 2018 08:03) (93 - 118)  BP: 97/67 (26 Apr 2018 08:03) (91/60 - 132/77)  BP(mean): --  RR: 18 (26 Apr 2018 08:03) (16 - 18)  SpO2: 94% (26 Apr 2018 08:03) (94% - 98%)    Physical Exam:  General: Elderly man appears younger than stated age, Well developed, well nourished, NAD, no use of accessory muscles.   HEENT: NCAT   Neck: Supple, nontender, no mass  Neurology: Awake and alert, conversational   Respiratory: clear to auscultation bilaterally, no wheezing/rales/rhonchi   CV: tachycardic irregular rhythm,  no murmurs, rubs or gallops  Abdominal: Soft, NT, ND +BSx4  Extremities: No C/C/E, + peripheral pulses  MSK: no joint erythema or warmth, no joint swelling       LABS:                        14.7   11.2  )-----------( 179      ( 26 Apr 2018 06:37 )             44.1     26 Apr 2018 06:37    140    |  102    |  21     ----------------------------<  114    4.1     |  27     |  1.30     Ca    8.4        26 Apr 2018 06:37    TPro  6.4    /  Alb  2.6    /  TBili  2.7    /  DBili  x      /  AST  83     /  ALT  44     /  AlkPhos  339    26 Apr 2018 06:37    LIVER FUNCTIONS - ( 26 Apr 2018 06:37 )  Alb: 2.6 g/dL / Pro: 6.4 g/dL / ALK PHOS: 339 U/L / ALT: 44 U/L / AST: 83 U/L / GGT: x           CAPILLARY BLOOD GLUCOSE  POCT Blood Glucose.: 115 mg/dL (26 Apr 2018 05:46)    CARDIAC MARKERS ( 26 Apr 2018 06:37 )  <.015 ng/mL / x     / 72 U/L / x     / 1.4 ng/mL  CARDIAC MARKERS ( 24 Apr 2018 13:36 )  <.015 ng/mL / x     / x     / x     / x      ABG - ( 26 Apr 2018 05:54 )  pH, Arterial: 7.46  pH, Blood: x     /  pCO2: 28    /  pO2: 358   / HCO3: 22    / Base Excess: -3.8  /  SaO2: 100       RADIOLOGY:  EXAM:  CT ANGIO CHEST (W)AW IC                        PROCEDURE DATE:  04/26/2018      INTERPRETATION:  CLINICAL INFORMATION:  Shortness of breath.  PROCEDURE:  Using multislice helical technique,  CT angiography, 1.5 mm   sections were obtained  following the intravenous administration of 94   ccs of Omnipaque 350.  Multiplanar MIP reconstructed images were obtained.    FINDINGS:    Comparison: CT scan 4/23/2018.    There is large conglomerate low-density mediastinal and hilar mass,   involving the right paratracheal, AP mediastinal window, subcarinal and   right hilum, which encases and narrows the truncus anteriosus and   bronchus intermedius.  No intraluminal pulmonary artery filling defects are noted.    There is bilateral centrilobular emphysematous disease.  Again noted is an approximately 2.5 cm mass with spiculated margins at   the periphery of the anterior right upper lobe. This extends to the   pleural surface. There is a small left-sided pleural effusion with   underlying compressive atelectasis.  There is a trace right-sided pleural effusion.    Limited images that include the upper abdomen demonstrate abdominal   ascites.  There is a poorly defined mass right hepatic lobe with small foci of air,   likely related to recent biopsy.    Impression:    Conglomerate mediastinal and hilar lymphadenopathy, which is narrowing   the pulmonary truncus arteriosus, without CT evidence of acute pulmonary   emboli.    Spiculated right upper lobe mass, suspicious for lung neoplasm.    Recommend further evaluation with PET/CT scan.    Other findings as discussed above.          Assessment/Plan:  73 YO m with PMH hepatic vein thrombosis, Afib (diagnosed on admission), +hep C AB, cirrhosis with RUQ pain and hilomediastinal parenchymal lung and liver masses with ascites admitted 4/23 with dyspnea. Post paracentesis yesterday with removal of 1.6 L and biopsy. Now with RR called for SOB and hypoxia and Afib with RVR, HR 150s.   SOB, ?hypoxia (poor read), hypotension, tachycardia Likely 2/2 to Afib with RVR vs PTX following paracentesis vs PE (pt off AC for 2 days for procedure) vs MI  - SpO2 saturation is appropriate on room air. CT angio of chest negative for PE. Continue monitoring with continuous pulse ox.   - CXR ordered, no significant changes from CXR from admission, unlikely PTX, awaiting official read  A.fib: during rapid patient given cardizem 10 mg IV push given, digoxin 0.25 mg IV loading dose given per Dr. Quezada's recommendation. HR improved to 110s. Continue Metoprolol tartrate 50mg PO q6hrs. Continue telemetry monitoring. Cardiology, Dr. Hutchinson, following. Continue FD Lovenox 70mg SQ BID. CE's negative during rapid.   -12 lead EKG showed A flutter at 101, no significant changes from EKG from admission   -given 500 cc LR for hypotension, BP currently is 97/67. RN to repeat BP for manual read. Patient asymptomatic at this time and is clinically stable.   - WBC slightly elevated at 11.2 but unchanged from previous. Otherwise CBC WNL. CMP significant for elevated Alk phos 339, AST 83 but with hx of cirrhosis and hep C, patient may have chronically elevated LFTs.

## 2018-04-26 NOTE — CHART NOTE - NSCHARTNOTEFT_GEN_A_CORE
Resident Rapid Response Note    Patient is a 72y old  Male who presents with a chief complaint of sob right upper quadrant pain for 2 weeks (23 Apr 2018 21:19)    Rapid response was called at 9:01AM on this 72y Male patient for desaturation to 70% and rapid A.fib. Patient seen and examined at bedside in acute respiratory distress. He was awake, alert and talking throughout RR. He complained of sob and chills/shivering, which started suddenly while he was laying comfortably. He denied chest pain, palpitations, abdominal pain.     Patient was seen and examined at the bedside by the rapid response team and primary team. ICU ALEKSANDAR Cook and RRT at bedside.    FOLLOW UP: Patient seen and examined in ICU with nurses at bedside. He is comfortably, awake, alert and having full conversations with me. He states his sob had improved, no longer feeling the chills. He is now on cardizem drip, IV vancomycin, with cooling blanket for rectal temperature of 104. He is saturating well on nasal cannula. He continues to deny chest pain, palpitations, numbness/tingling.     Vital Signs Last 24 Hrs  T(C): 37.7 (26 Apr 2018 08:03), Max: 37.7 (26 Apr 2018 08:03)  T(F): 99.8 (26 Apr 2018 08:03), Max: 99.8 (26 Apr 2018 08:03)  HR: 154 (26 Apr 2018 10:12) (78 - 154)  BP: 108/75 (26 Apr 2018 10:12) (91/60 - 132/77)  RR: 23 (26 Apr 2018 10:12) (16 - 23)  SpO2: 95% (26 Apr 2018 08:20) (94% - 98%) supplemental 02     Physical Exam:  General: Elderly  male in acute respiratory distress, shivering, accessory muscles in use, well developed, well nourished   HEENT: NCAT  Neck: JVD   Neurology: A&Ox3, nonfocal, moving all extremities   Respiratory: coarse breath sounds bilaterally   CV: RRR, +S1/S2, no murmurs, rubs or gallops  Abdominal: Soft, NT, ND +BSx4, dressings from procedure on Right flank. the dressings are c/d/i  Extremities: No C/C/E, + peripheral pulses  MSK: Normal ROM, no joint erythema or warmth, no joint swelling   Skin: slightly cold, dry, normal color, no rash or abnormal lesions    LABS:                          14.1   6.9   )-----------( 179      ( 26 Apr 2018 09:57 )             44.0     26 Apr 2018 09:57    140    |  103    |  21     ----------------------------<  73     4.2     |  21     |  1.50     Ca    8.3        26 Apr 2018 09:57  Phos  2.9       26 Apr 2018 09:57  Mg     1.8       26 Apr 2018 09:57    TPro  6.4    /  Alb  2.5    /  TBili  3.0    /  DBili  x      /  AST  100    /  ALT  42     /  AlkPhos  367    26 Apr 2018 09:57    LIVER FUNCTIONS - ( 26 Apr 2018 09:57 )  Alb: 2.5 g/dL / Pro: 6.4 g/dL / ALK PHOS: 367 U/L / ALT: 42 U/L / AST: 100 U/L / GGT: x           CAPILLARY BLOOD GLUCOSE  POCT Blood Glucose.: 87 mg/dL (26 Apr 2018 09:18)  POCT Blood Glucose.: 115 mg/dL (26 Apr 2018 05:46)    CARDIAC MARKERS ( 26 Apr 2018 09:57 )  .032 ng/mL / x     / 94 U/L / x     / 1.3 ng/mL  CARDIAC MARKERS ( 26 Apr 2018 06:37 )  <.015 ng/mL / x     / 72 U/L / x     / 1.4 ng/mL  CARDIAC MARKERS ( 24 Apr 2018 13:36 )  <.015 ng/mL / x     / x     / x     / x          RADIOLOGY & ADDITIONAL TESTS:  EKG: Rapid a.fib   ABG: unchanged from prior     Assessment/Plan: 71 YO m with PMH hepatic vein thrombosis, Afib (diagnosed on admission), +hep C AB, cirrhosis with RUQ pain and hilomediastinal parenchymal lung and liver masses with ascites admitted 4/23 with dyspnea. Post paracentesis yesterday with removal of 1.6 L and biopsy. Now with RR called for SOB with desaturation to 70% on RA and Afib with RVR.  Patient is clinically stable now on nasal cannula, cooling blanket and cardizem gtt.   SOB, ?hypoxia (very difficult to obtain SpO2) from pneumonia? with new rectal tempreature, complicated by tachycardia Likely 2/2 to Afib with RVR vs MI  - SOB improved on ventimask but advanced to BiPAP as continues to be tachycardic and unable to obtain SpO2. Zofran x1 ordered for nausea. Patient now on Nasal cannula, saturating well.   -CT angio of chest negative from previous RR negative for PE. Continue monitoring with continuous pulse ox.   - CXR, no significant changes from CXR from admission, unlikely PTX, awaiting official read  -Fever, 2/2 pneumonia? Motrin and Tylenol given. Cooling blanket in place and continue to monitor with rectal probe. f/u blood cultures. Continue IV vancomycin.   A.fib: Started on Cardizem gtt and continue to give digoxin 0.25 mg IV loading dose given per Dr. Del Real' recommendation. Continue Metoprolol tartrate 50mg PO q6hrs. Continue telemetry monitoring. Cardiology, Dr. Courtney, following. Continue FD Lovenox 70mg SQ BID. CBC WNL, Cr increased to 1.5, CE's significant for trop 0.32, likely from increased work of breathing. Continue monitoring in ICU.   -12 lead EKG showed Atrial fibrillation, no significant changes from EKG from admission   - Dr. Yue Gutierrez notified of acute event.   - Family at bedside  - ICU team to discuss advanced directives with patient   -Will continue to follow, RN to call if any changes. Resident Rapid Response Note    Patient is a 72y old  Male who presents with a chief complaint of sob right upper quadrant pain for 2 weeks (23 Apr 2018 21:19)    Rapid response was called at 9:01AM on this 72y Male patient for desaturation to 70% and rapid A.fib. Patient seen and examined at bedside in acute respiratory distress. He was awake, alert and talking throughout RR. He complained of sob and chills/shivering, which started suddenly while he was laying comfortably. He denied chest pain, palpitations, abdominal pain.     Patient was seen and examined at the bedside by the rapid response team and primary team. ICU ALEKSANDAR Cook and RRT at bedside.    FOLLOW UP: Patient seen and examined in ICU with nurses at bedside. He is comfortably, awake, alert and having full conversations with me. He states his sob had improved, no longer feeling the chills. He is now on cardizem drip, IV vancomycin, with cooling blanket for rectal temperature of 104. He is saturating well on nasal cannula. He continues to deny chest pain, palpitations, numbness/tingling.     Vital Signs Last 24 Hrs  T(C): 37.7 (26 Apr 2018 08:03), Max: 37.7 (26 Apr 2018 08:03)  T(F): 99.8 (26 Apr 2018 08:03), Max: 99.8 (26 Apr 2018 08:03)  HR: 154 (26 Apr 2018 10:12) (78 - 154)  BP: 108/75 (26 Apr 2018 10:12) (91/60 - 132/77)  RR: 23 (26 Apr 2018 10:12) (16 - 23)  SpO2: 95% (26 Apr 2018 08:20) (94% - 98%) supplemental 02     Physical Exam:  General: Elderly  male in acute respiratory distress, shivering, accessory muscles in use, well developed, well nourished   HEENT: NCAT  Neck: JVD   Neurology: A&Ox3, nonfocal, moving all extremities   Respiratory: coarse breath sounds bilaterally   CV: RRR, +S1/S2, no murmurs, rubs or gallops  Abdominal: Soft, NT, ND +BSx4, dressings from procedure on Right flank. the dressings are c/d/i  Extremities: No C/C/E, + peripheral pulses  MSK: Normal ROM, no joint erythema or warmth, no joint swelling   Skin: slightly cold, dry, normal color, no rash or abnormal lesions    LABS:                          14.1   6.9   )-----------( 179      ( 26 Apr 2018 09:57 )             44.0     26 Apr 2018 09:57    140    |  103    |  21     ----------------------------<  73     4.2     |  21     |  1.50     Ca    8.3        26 Apr 2018 09:57  Phos  2.9       26 Apr 2018 09:57  Mg     1.8       26 Apr 2018 09:57    TPro  6.4    /  Alb  2.5    /  TBili  3.0    /  DBili  x      /  AST  100    /  ALT  42     /  AlkPhos  367    26 Apr 2018 09:57    LIVER FUNCTIONS - ( 26 Apr 2018 09:57 )  Alb: 2.5 g/dL / Pro: 6.4 g/dL / ALK PHOS: 367 U/L / ALT: 42 U/L / AST: 100 U/L / GGT: x           CAPILLARY BLOOD GLUCOSE  POCT Blood Glucose.: 87 mg/dL (26 Apr 2018 09:18)  POCT Blood Glucose.: 115 mg/dL (26 Apr 2018 05:46)    CARDIAC MARKERS ( 26 Apr 2018 09:57 )  .032 ng/mL / x     / 94 U/L / x     / 1.3 ng/mL  CARDIAC MARKERS ( 26 Apr 2018 06:37 )  <.015 ng/mL / x     / 72 U/L / x     / 1.4 ng/mL  CARDIAC MARKERS ( 24 Apr 2018 13:36 )  <.015 ng/mL / x     / x     / x     / x          RADIOLOGY & ADDITIONAL TESTS:  EKG: Rapid a.fib   ABG: unchanged from prior     Assessment/Plan: 73 YO m with PMH hepatic vein thrombosis, Afib (diagnosed on admission), +hep C AB, cirrhosis with RUQ pain and hilomediastinal parenchymal lung and liver masses with ascites admitted 4/23 with dyspnea. Post paracentesis yesterday with removal of 1.6 L and biopsy. Now with RR called for SOB with desaturation to 70% on RA and Afib with RVR.  Patient is clinically stable now on nasal cannula, cooling blanket and cardizem gtt.   SOB, ?hypoxia (very difficult to obtain SpO2) from pneumonia? with new rectal tempreature, complicated by tachycardia Likely 2/2 to Afib with RVR vs MI  - SOB improved on ventimask but advanced to BiPAP as continues to be tachycardic and unable to obtain SpO2. Zofran x1 ordered for nausea. Patient now on Nasal cannula, saturating well.   -CT angio of chest negative from previous RR negative for PE. Continue monitoring with continuous pulse ox.   - CXR, no significant changes from CXR from admission, unlikely PTX, awaiting official read  -Fever, 2/2 pneumonia? Motrin and Tylenol given. Cooling blanket in place and continue to monitor with rectal probe. f/u blood cultures. Continue IV vancomycin. Consider RVP.   A.fib: Started on Cardizem gtt and continue to give digoxin 0.25 mg IV loading dose given per Dr. Del Real' recommendation. Continue Metoprolol tartrate 50mg PO q6hrs. Continue telemetry monitoring. Cardiology, Dr. Courtney, following. Continue FD Lovenox 70mg SQ BID. CBC WNL, Cr increased to 1.5, CE's significant for trop 0.32, likely from increased work of breathing. Continue monitoring in ICU.   -12 lead EKG showed Atrial fibrillation, no significant changes from EKG from admission   - Dr. Yue Gutierrez notified of acute event.   - Family at bedside  - ICU team to discuss advanced directives with patient   - Continue management as per ICU team   -Will continue to follow, RN to call if any changes.

## 2018-04-27 DIAGNOSIS — A41.9 SEPSIS, UNSPECIFIED ORGANISM: ICD-10-CM

## 2018-04-27 LAB
ALBUMIN SERPL ELPH-MCNC: 2.9 G/DL — LOW (ref 3.3–5)
ALP SERPL-CCNC: 261 U/L — HIGH (ref 40–120)
ALT FLD-CCNC: 109 U/L — HIGH (ref 12–78)
AMMONIA BLD-MCNC: <17 UMOL/L — SIGNIFICANT CHANGE UP (ref 11–32)
ANION GAP SERPL CALC-SCNC: 15 MMOL/L — SIGNIFICANT CHANGE UP (ref 5–17)
APTT BLD: 67.3 SEC — HIGH (ref 27.5–37.4)
AST SERPL-CCNC: 673 U/L — HIGH (ref 15–37)
AUTO DIFF PNL BLD: ABNORMAL
BILIRUB SERPL-MCNC: 3.9 MG/DL — HIGH (ref 0.2–1.2)
BUN SERPL-MCNC: 29 MG/DL — HIGH (ref 7–23)
CALCIUM SERPL-MCNC: 7.9 MG/DL — LOW (ref 8.5–10.1)
CHLORIDE SERPL-SCNC: 102 MMOL/L — SIGNIFICANT CHANGE UP (ref 96–108)
CO2 SERPL-SCNC: 22 MMOL/L — SIGNIFICANT CHANGE UP (ref 22–31)
CREAT SERPL-MCNC: 1.9 MG/DL — HIGH (ref 0.5–1.3)
GLUCOSE SERPL-MCNC: 106 MG/DL — HIGH (ref 70–99)
HCT VFR BLD CALC: 37.5 % — LOW (ref 39–50)
HCT VFR BLD CALC: 39.8 % — SIGNIFICANT CHANGE UP (ref 39–50)
HGB BLD-MCNC: 12.2 G/DL — LOW (ref 13–17)
HGB BLD-MCNC: 13 G/DL — SIGNIFICANT CHANGE UP (ref 13–17)
INR BLD: 1.69 RATIO — HIGH (ref 0.88–1.16)
LYMPHOCYTES # BLD AUTO: 5 % — LOW (ref 13–44)
LYMPHOCYTES # BLD AUTO: 8 % — LOW (ref 13–44)
MAGNESIUM SERPL-MCNC: 1.9 MG/DL — SIGNIFICANT CHANGE UP (ref 1.6–2.6)
MCHC RBC-ENTMCNC: 29.5 PG — SIGNIFICANT CHANGE UP (ref 27–34)
MCHC RBC-ENTMCNC: 29.7 PG — SIGNIFICANT CHANGE UP (ref 27–34)
MCHC RBC-ENTMCNC: 32.5 GM/DL — SIGNIFICANT CHANGE UP (ref 32–36)
MCHC RBC-ENTMCNC: 32.6 GM/DL — SIGNIFICANT CHANGE UP (ref 32–36)
MCV RBC AUTO: 90.7 FL — SIGNIFICANT CHANGE UP (ref 80–100)
MCV RBC AUTO: 91.2 FL — SIGNIFICANT CHANGE UP (ref 80–100)
MONOCYTES NFR BLD AUTO: 1 % — SIGNIFICANT CHANGE UP (ref 1–9)
MONOCYTES NFR BLD AUTO: 3 % — SIGNIFICANT CHANGE UP (ref 1–9)
NEUTROPHILS NFR BLD AUTO: 76 % — SIGNIFICANT CHANGE UP (ref 43–77)
NEUTROPHILS NFR BLD AUTO: 86 % — HIGH (ref 43–77)
PHOSPHATE SERPL-MCNC: 5.2 MG/DL — HIGH (ref 2.5–4.5)
PLATELET # BLD AUTO: 129 K/UL — LOW (ref 150–400)
PLATELET # BLD AUTO: 168 K/UL — SIGNIFICANT CHANGE UP (ref 150–400)
POTASSIUM SERPL-MCNC: 5 MMOL/L — SIGNIFICANT CHANGE UP (ref 3.5–5.3)
POTASSIUM SERPL-SCNC: 5 MMOL/L — SIGNIFICANT CHANGE UP (ref 3.5–5.3)
PROT SERPL-MCNC: 6 G/DL — SIGNIFICANT CHANGE UP (ref 6–8.3)
PROTHROM AB SERPL-ACNC: 18.6 SEC — HIGH (ref 9.8–12.7)
RBC # BLD: 4.12 M/UL — LOW (ref 4.2–5.8)
RBC # BLD: 4.39 M/UL — SIGNIFICANT CHANGE UP (ref 4.2–5.8)
RBC # FLD: 15.4 % — HIGH (ref 10.3–14.5)
RBC # FLD: 15.5 % — HIGH (ref 10.3–14.5)
SODIUM SERPL-SCNC: 139 MMOL/L — SIGNIFICANT CHANGE UP (ref 135–145)
WBC # BLD: 41.1 K/UL — CRITICAL HIGH (ref 3.8–10.5)
WBC # BLD: 43 K/UL — CRITICAL HIGH (ref 3.8–10.5)
WBC # FLD AUTO: 41.1 K/UL — CRITICAL HIGH (ref 3.8–10.5)
WBC # FLD AUTO: 43 K/UL — CRITICAL HIGH (ref 3.8–10.5)

## 2018-04-27 PROCEDURE — 99291 CRITICAL CARE FIRST HOUR: CPT

## 2018-04-27 RX ORDER — VANCOMYCIN HCL 1 G
1000 VIAL (EA) INTRAVENOUS ONCE
Qty: 0 | Refills: 0 | Status: COMPLETED | OUTPATIENT
Start: 2018-04-27 | End: 2018-04-27

## 2018-04-27 RX ORDER — DIGOXIN 250 MCG
0.12 TABLET ORAL DAILY
Qty: 0 | Refills: 0 | Status: DISCONTINUED | OUTPATIENT
Start: 2018-04-27 | End: 2018-04-29

## 2018-04-27 RX ORDER — DRONABINOL 2.5 MG
2.5 CAPSULE ORAL
Qty: 0 | Refills: 0 | Status: DISCONTINUED | OUTPATIENT
Start: 2018-04-27 | End: 2018-04-29

## 2018-04-27 RX ORDER — LIDOCAINE 4 G/100G
2 CREAM TOPICAL DAILY
Qty: 0 | Refills: 0 | Status: DISCONTINUED | OUTPATIENT
Start: 2018-04-27 | End: 2018-04-29

## 2018-04-27 RX ORDER — SODIUM CHLORIDE 9 MG/ML
500 INJECTION, SOLUTION INTRAVENOUS ONCE
Qty: 0 | Refills: 0 | Status: COMPLETED | OUTPATIENT
Start: 2018-04-27 | End: 2018-04-27

## 2018-04-27 RX ADMIN — Medication 25 MILLILITER(S): at 00:18

## 2018-04-27 RX ADMIN — SODIUM CHLORIDE 500 MILLILITER(S): 9 INJECTION, SOLUTION INTRAVENOUS at 01:16

## 2018-04-27 RX ADMIN — Medication 2.5 MILLIGRAM(S): at 14:08

## 2018-04-27 RX ADMIN — Medication 250 MILLIGRAM(S): at 14:08

## 2018-04-27 RX ADMIN — Medication 3 MILLILITER(S): at 19:54

## 2018-04-27 RX ADMIN — PANTOPRAZOLE SODIUM 40 MILLIGRAM(S): 20 TABLET, DELAYED RELEASE ORAL at 06:04

## 2018-04-27 RX ADMIN — OXYCODONE AND ACETAMINOPHEN 1 TABLET(S): 5; 325 TABLET ORAL at 23:00

## 2018-04-27 RX ADMIN — ENOXAPARIN SODIUM 70 MILLIGRAM(S): 100 INJECTION SUBCUTANEOUS at 18:24

## 2018-04-27 RX ADMIN — PIPERACILLIN AND TAZOBACTAM 25 GRAM(S): 4; .5 INJECTION, POWDER, LYOPHILIZED, FOR SOLUTION INTRAVENOUS at 21:47

## 2018-04-27 RX ADMIN — MIDODRINE HYDROCHLORIDE 15 MILLIGRAM(S): 2.5 TABLET ORAL at 14:09

## 2018-04-27 RX ADMIN — Medication 25 MILLILITER(S): at 05:43

## 2018-04-27 RX ADMIN — ENOXAPARIN SODIUM 70 MILLIGRAM(S): 100 INJECTION SUBCUTANEOUS at 05:44

## 2018-04-27 RX ADMIN — PIPERACILLIN AND TAZOBACTAM 25 GRAM(S): 4; .5 INJECTION, POWDER, LYOPHILIZED, FOR SOLUTION INTRAVENOUS at 05:44

## 2018-04-27 RX ADMIN — Medication 0.12 MILLIGRAM(S): at 15:37

## 2018-04-27 RX ADMIN — MIDODRINE HYDROCHLORIDE 15 MILLIGRAM(S): 2.5 TABLET ORAL at 21:45

## 2018-04-27 RX ADMIN — OXYCODONE AND ACETAMINOPHEN 1 TABLET(S): 5; 325 TABLET ORAL at 21:46

## 2018-04-27 RX ADMIN — PIPERACILLIN AND TAZOBACTAM 25 GRAM(S): 4; .5 INJECTION, POWDER, LYOPHILIZED, FOR SOLUTION INTRAVENOUS at 14:07

## 2018-04-27 RX ADMIN — MIDODRINE HYDROCHLORIDE 15 MILLIGRAM(S): 2.5 TABLET ORAL at 05:43

## 2018-04-27 NOTE — PROGRESS NOTE ADULT - ASSESSMENT
71 yo M with Hep C, COPD, CHF, presenting with abnormal LFTs, found to have large liver mass, extensive mediastinal LAD, spiculated BRENT nodule concerning for malignancy, course now complicated by uncontrolled Afib and suspected septic shock.    -Neuro: AAOx3, additional pain control with lidocaine patch, continue percocet; stable  -Cardio; afib, continue digoxin after f/u with digoxin level; on levophed currently for BP support, plan to wean; continue midodrine; discontinue daily potassium supplementation due to elevated K 71 yo M with Hep C, COPD, CHF, presenting with abnormal LFTs, found to have large liver mass, extensive mediastinal LAD, spiculated BRENT nodule concerning for malignancy, course now complicated by uncontrolled Afib and suspected septic shock.    -Neuro: AAOx3, additional pain control with lidocaine patch, continue percocet; stable  -Cardio; afib, continue digoxin after f/u with digoxin level; on levophed currently for BP support, plan to wean; continue midodrine; discontinue daily potassium supplementation due to elevated K, lasix held due to hypotension, continue full dose AC with lovenox for portal vein thrombosis and afib  -Resp: currently saturating well on NC, will discontinue bipap. continue duoneb; Patient was planned for lung biopsy when stable, however, patient and family considering no invasive procedures at this point  -GI: 71 yo M with Hep C, COPD, CHF, presenting with abnormal LFTs, found to have large liver mass, extensive mediastinal LAD, spiculated BRENT nodule concerning for malignancy, course now complicated by uncontrolled Afib and suspected septic shock.    -Neuro: AAOx3, additional pain control with lidocaine patch, continue percocet; stable  -Cardio; afib, continue digoxin after f/u with digoxin level; on levophed currently for BP support, plan to wean; continue midodrine; discontinue daily potassium supplementation due to elevated K, lasix held due to hypotension, continue full dose AC with lovenox for portal vein thrombosis and afib  -Resp: currently saturating well on NC, will discontinue bipap. continue duoneb; Patient was planned for lung biopsy when stable, however, patient and family considering no invasive procedures at this point  -GI: continue to trend hepatic panel, s/p IR biopsy/paracentesis on 4/25-negative and no growth to date; f/u liver biopsy results. Portal vein thrombosis.   -Renal: DAVID, avoid nephrotoxic medications, s/p LR bolus; f/u urine cx; trend renal fxn   -Endo: stable  -ID: significantly elevated WBC today, febrile episode yesterday AM, patient recently underwent liver biopsy, evaluate for possible source of infection vs malignancy; f/u urine culture, f/u blood cx, f/u liver biopsy result, trend wbc. Patient received 1x vanco yesterday and 1x vanco today, recheck vanco level in AM and renally dose as appropriate. Continue zosyn   Dispo: patient and family wants no aggressive measures, hospice evaluation, and DNR/DNI.   PPx: lovenox, protonix 73 yo M with Hep C, COPD, CHF, presenting with abnormal LFTs, found to have large liver mass, extensive mediastinal LAD, spiculated BRENT nodule concerning for malignancy, course now complicated by uncontrolled Afib and suspected septic shock.    -Neuro: AAOx3, additional pain control with lidocaine patch, continue percocet; stable  -Cardio; afib, continue digoxin after f/u with digoxin level; on levophed currently for BP support, plan to wean; continue midodrine; discontinue daily potassium supplementation due to elevated K, lasix held due to hypotension, continue full dose AC with lovenox for portal vein thrombosis and afib  -Resp: currently saturating well on NC, will discontinue bipap. continue duoneb; Patient was planned for lung biopsy when stable, however, patient and family considering no invasive procedures at this point  -GI: continue to trend hepatic panel, s/p IR biopsy/paracentesis on 4/25-negative and no growth to date; f/u liver biopsy results. Portal vein thrombosis.   -Renal: DAVID, avoid nephrotoxic medications, s/p LR bolus; f/u urine cx; trend renal fxn, f/u urine lytes  -Endo: stable  -ID: significantly elevated WBC today, febrile episode yesterday AM, patient recently underwent liver biopsy, evaluate for possible source of infection vs malignancy; f/u urine culture, f/u blood cx, f/u liver biopsy result, trend wbc. Patient received 1x vanco yesterday and 1x vanco today, recheck vanco level in AM and renally dose as appropriate. Continue zosyn   Dispo: patient and family wants no aggressive measures, hospice evaluation, and DNR/DNI.   PPx: lovenox, protonix 71 yo M with Hep C, COPD, CHF, presenting with abnormal LFTs, found to have large liver mass, extensive mediastinal LAD, spiculated BRENT nodule concerning for malignancy, course now complicated by uncontrolled Afib and suspected septic shock.    -Neuro: AAOx3, additional pain control with lidocaine patch, continue percocet; stable  -Cardio; afib, continue digoxin 0.125 daily, f/u digoxin level; on levophed currently for BP support, plan to wean; continue midodrine; discontinue daily potassium supplementation due to elevated K, lasix held due to hypotension, continue full dose AC with lovenox for portal vein thrombosis and afib.   -Resp: currently saturating well on NC, will discontinue bipap. continue duoneb; Patient was planned for lung biopsy when stable, however, patient and family considering no invasive procedures at this point  -GI: continue to trend hepatic panel, s/p IR biopsy/paracentesis on 4/25-negative and no growth to date; f/u liver biopsy results. Portal vein thrombosis. Started on marinol for appetite stimulation  -Renal: DAVID, avoid nephrotoxic medications, s/p LR bolus; f/u urine cx; trend renal fxn, f/u urine lytes  -Endo: stable  -ID: significantly elevated WBC today, febrile episode yesterday AM, patient recently underwent liver biopsy, evaluate for possible source of infection vs malignancy; f/u urine culture, f/u blood cx, f/u liver biopsy result, trend wbc. Patient received 1x vanco yesterday and 1x vanco today, recheck vanco level in AM and renally dose as appropriate. Continue zosyn. Patient wishes to go home, ID to evaluate to switch to possible PO antibiotics on discharge.   Dispo: patient and family wants no aggressive measures, hospice evaluation, and DNR/DNI.   PPx: lovenox, protonix

## 2018-04-27 NOTE — PROGRESS NOTE ADULT - PROBLEM SELECTOR PLAN 1
Now with tender4 RUQ, leukocytosis with bandemia, abn LFTs, mass, concern for infectious versus due to malignancy but will discuss with ICU team   -would continue broad spectrum IV abx pending further micro data and results of path

## 2018-04-27 NOTE — PROGRESS NOTE ADULT - ASSESSMENT
72m htn, + tobacco, here with jaundice  ascites, liver mass, lung mass, Hep C  complicated by afib  portal vein thrombosis  fever, leukocytosis and bandemia

## 2018-04-27 NOTE — PROGRESS NOTE ADULT - SUBJECTIVE AND OBJECTIVE BOX
infectious diseases progress note:    DIONE PALMER is a 72y y. o. Male patient    Patient reports: really wanting to go home because it is hard to sleep here and nothing really happens on the weekends, had two breathing episodes yesterday    ROS:    EYES:  Negative  blurry vision or double vision  GASTROINTESTINAL:  Negative for nausea, vomiting, diarrhea  -otherwise negative except for subjective    Allergies    No Known Allergies    Intolerances        ANTIBIOTICS/RELEVANT:  antimicrobials  piperacillin/tazobactam IVPB. 3.375 Gram(s) IV Intermittent every 8 hours    immunologic:    OTHER:  albumin human 25% IVPB 50 milliLiter(s) IV Intermittent once PRN  ALBUTerol/ipratropium for Nebulization 3 milliLiter(s) Nebulizer every 8 hours  enoxaparin Injectable 70 milliGRAM(s) SubCutaneous every 12 hours  midodrine 15 milliGRAM(s) Oral every 8 hours  norepinephrine Infusion 0.03 MICROgram(s)/kG/Min IV Continuous <Continuous>  oxyCODONE    5 mG/acetaminophen 325 mG 1 Tablet(s) Oral every 4 hours PRN  pantoprazole    Tablet 40 milliGRAM(s) Oral before breakfast  potassium chloride    Tablet ER 10 milliEquivalent(s) Oral daily  senna 2 Tablet(s) Oral at bedtime      Objective:  Last 24-Vital Signs Last 24 Hrs  T(C): 36.4 (27 Apr 2018 07:52), Max: 40.2 (26 Apr 2018 10:36)  T(F): 97.5 (27 Apr 2018 07:52), Max: 104.4 (26 Apr 2018 10:36)  HR: 97 (27 Apr 2018 07:17) (96 - 161)  BP: 110/75 (27 Apr 2018 07:00) (79/56 - 145/58)  BP(mean): 90 (27 Apr 2018 07:00) (62 - 98)  RR: 22 (27 Apr 2018 07:00) (22 - 37)  SpO2: 95% (27 Apr 2018 07:17) (85% - 100%)    T(C): 36.4 (04-27-18 @ 07:52), Max: 40.2 (04-26-18 @ 10:36)  T(F): 97.5 (04-27-18 @ 07:52), Max: 104.4 (04-26-18 @ 10:36)  T(C): 36.4 (04-27-18 @ 07:52), Max: 40.2 (04-26-18 @ 10:36)  T(F): 97.5 (04-27-18 @ 07:52), Max: 104.4 (04-26-18 @ 10:36)  T(C): 36.4 (04-27-18 @ 07:52), Max: 40.2 (04-26-18 @ 10:36)  T(F): 97.5 (04-27-18 @ 07:52), Max: 104.4 (04-26-18 @ 10:36)    PHYSICAL EXAM:  Constitutional: Well-developed, well nourished  Eyes: PERRLA, EOMI  Ear/Nose/Throat: oropharynx normal	  Neck: no JVD, no lymphadenopathy, supple, elevated JVP  Respiratory: no accessory muscle use, lung fields bilaterally clear  Cardiovascular: irregularly irregular  Gastrointestinal: soft, tender in RUQ, noted liver enlargement  Extremities: no clubbing, no cyanosis, edema absent  Neuro: patient alert, oriented and appropriate  Skin: no sig lesions      LABS:                        13.0   41.1  )-----------( 129      ( 27 Apr 2018 08:14 )             39.8     Band Neutrophils %: 10.0 % (04.27.18 @ 08:14)        WBC 41.1  04-27 @ 08:14  WBC 43.0  04-27 @ 05:27  WBC 6.9  04-26 @ 09:57  WBC 11.2  04-26 @ 06:37  WBC 11.3  04-25 @ 06:04  WBC 13.7  04-23 @ 14:30      04-27    139  |  102  |  29<H>  ----------------------------<  106<H>  5.0   |  22  |  1.90<H>    Ca    7.9<L>      27 Apr 2018 05:27  Phos  5.2     04-27  Mg     1.9     04-27    TPro  6.0  /  Alb  2.9<L>  /  TBili  3.9<H>  /  DBili  x   /  AST  673<H>  /  ALT  109<H>  /  AlkPhos  261<H>  04-27      Creatinine, Serum: 1.90 mg/dL (04-27-18 @ 05:27)  Creatinine, Serum: 1.50 mg/dL (04-26-18 @ 09:57)  Creatinine, Serum: 1.30 mg/dL (04-26-18 @ 06:37)  Creatinine, Serum: 1.20 mg/dL (04-25-18 @ 06:04)  Creatinine, Serum: 1.30 mg/dL (04-23-18 @ 14:30)      PT/INR - ( 27 Apr 2018 05:27 )   PT: 18.6 sec;   INR: 1.69 ratio         PTT - ( 27 Apr 2018 05:27 )  PTT:67.3 sec      Hepatitis C Virus RNA Detection by PCR: [id number] IU/mL (04.24.18 @ 11:18)        MICROBIOLOGY:    Culture - Body Fluid with Gram Stain (04.25.18 @ 16:51)    Gram Stain:   No polymorphonuclear cells seen per low power field  No organisms seen per oil power field  by cytocentrifuge    Specimen Source: Peritoneal Peritoneal Fluid    Culture Results:   No growth to date    RADIOLOGY & ADDITIONAL STUDIES:    < from: CT Angio Chest w/ IV Cont (04.26.18 @ 07:55) >    EXAM:  CT ANGIO CHEST (W)AW IC                            PROCEDURE DATE:  04/26/2018          INTERPRETATION:  CLINICAL INFORMATION:  Shortness of breath.    PROCEDURE:  Using multislice helical technique,  CT angiography, 1.5 mm   sections were obtained  following the intravenous administration of 94   ccs of Omnipaque 350.  Multiplanar MIP reconstructed images were obtained.    FINDINGS:      Comparison: CT scan 4/23/2018.    There is large conglomerate low-density mediastinal and hilar mass,   involving the right paratracheal, AP mediastinal window, subcarinal and   right hilum, which encases and narrows the truncus anteriosus and   bronchus intermedius.  No intraluminal pulmonary artery filling defects are noted.    There is bilateral centrilobular emphysematous disease.  Again noted is an approximately 2.5 cm mass with spiculated margins at   the periphery of the anterior right upper lobe. This extends to the   pleural surface. There is a small left-sided pleural effusion with   underlying compressive atelectasis.  There is a trace right-sided pleural effusion.    Limited images that include the upper abdomen demonstrate abdominal   ascites.  There is a poorly defined mass right hepatic lobe with small foci of air,   likely related to recent biopsy.    Impression:    Conglomerate mediastinal and hilar lymphadenopathy, which is narrowing   the pulmonary truncus arteriosus, without CT evidence of acute pulmonary   emboli.    Spiculated right upper lobe mass, suspicious for lung neoplasm.    Recommend further evaluation with PET/CT scan.    < from: CT Abdomen and Pelvis w/ Oral Cont and w/ IV Cont (04.23.18 @ 18:54) >    EXAM:  CT ABDOMEN AND PELVIS OC IC                          EXAM:  CT CHEST IC                            PROCEDURE DATE:  04/23/2018          INTERPRETATION:  CT CHEST/ABDOMEN/PELVIS:     CLINICAL INFORMATION: Shortness of breath, Right upper quadrant abdominal   pain, elevated liver enzymes and jaundice.    COMPARISON: None available.    PROCEDURE:  Using multislice helical CT, following the intravenous   administration of 95 cc of Omnipaque 350, 2.5 mm sections were obtained   from the thoracic inlet through the ischial tuberosities.  Multiplanar reformatted images.      There is a large conglomerate right hilar and mediastinal mass, which   encases and markedly narrows the truncus arteriosus. There is confluent   involvement of the right paratracheal, AP mediastinal and subcarinal   mediastinum, extending to the right azygo esophageal recess. The mass   surrounds and mildly narrows the bronchus intermedius.    There is bilateral centrilobular emphysematous disease.  There is an approximately 2.5 cm peripheral mass at the anterior right   upper lobe, with extension to the pleural surface and spiculated margins.   There is a small right-sided pleural effusion, with underlying   compressive atelectasis.  Small left-sided pleural effusion with underlying compressive atelectasis   is noted.    The central airways remain patent.    There is a nodular contour of the liver, likely reflecting cirrhotic   morphology.  There is a large, poorly defined, heterogeneous mass occupying the right   hepatic lobe, measuring approximate 7 cm. Central areas of decreased   attenuation may reflect necrosis.  There is an enlarged thrombosed main portal vein, with thrombus extending   from the portal confluence. There is heterogeneous soft tissue   enhancement compatible with tumor thrombus.  There is intrahepatic biliary ductal dilatation.    The spleen is enlarged measuring approximately 13 cm in length.  There are prominent collateral vessels in the perigastric, gastrohepatic   ligament and perisplenic region, findings likely reflecting portal venous   hypertension.    The gallbladder is distended, possibly containing gallstones.    There is heterogeneity at the head of the pancreas.    The adrenal glands are unremarkable in appearance.    There is a moderate volume of abdominal and pelvic ascites.    No hydronephrosis is noted.    There is arteriosclerotic calcification of the abdominal aorta, including   major branch vessels with aneurysmal dilatation of the bilateral common   iliac arteries.    No enlarged retroperitoneal lymphadenopathy is noted.    The evaluation of the stomach is limited without distention.  No bowel obstruction is noted. There is underdistended distention of the   colon, however, bowel wall thickening involving the right colon,   ascending colon through the hepatic flexure, and splenic flexure.    No free intraperitoneal air is noted.    There is a left inguinal hernia sac containing fluid.    The urinary bladder is moderately distended.  There are coarse central calcifications within the prostate gland.    There are multilevel degenerative changes of the lumbar spine.    There are severe degenerative changes at the left hip joint with   flattening of the femoral head and marked subchondral cystic erosion.    Impression:    Cirrhotic morphology with large heterogeneous right hepatic mass, which   may reflect hepatocellular carcinoma.  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  Moderate abdominal pelvic ascites.    Extensive confluent right hilar and mediastinal lymphadenopathy, could   reflect metastatic disease.  Spiculated mass right upper lobe. Cannot exclude additional primary   bronchogenic lung cancer.    Other findings as discussed.    Recommend further evaluation with PET/CT scan as well as MRI of the   abdomen if there are no clinical contraindications.

## 2018-04-27 NOTE — PROGRESS NOTE ADULT - SUBJECTIVE AND OBJECTIVE BOX
[facility] Cardiology    CHIEF COMPLAINT: Patient is a 72y old  Male who presents with a chief complaint of sob right upper quadrant pain for 2 weeks (23 Apr 2018 21:19)      Follow Up: [ ] Chest Pain      [ ] Dyspnea     [ ] Palpitations    [ ] Atrial Fibrillation     [ ] Ventricular Dysrhythmia    [ ] Abnormal EKG                      [ ] Abnormal Cardiac Enzymes     [ ] Valvular Disease    HPI:  patient came to the office for ruq pain  sob for 1 week  saw dr morse friday,lft mildly elevated,was told to be seen  patient has refused all preventative w/u in the past,except labs and bp medications  refused cxr/colonoscopy  when he came in today,i knew there was a problem,,he usually will come only once a year  he had ruq pain,hepatomegally,leg edema 2+  i sent him to the er,,i explained it may be cancer primarily or simpler like acute choly/pancreatitis  girl friend was present (23 Apr 2018 21:19)    Pt now in ICU   on norepinephrine Infusion 0.03 MICROgram(s)/kG/Min (4.084 mL/Hr) IV Continuous <Continuous>  BB held sec to hypotension  Pt mildly tachycardic -110 bpm, AF    PAST MEDICAL & SURGICAL HISTORY:  Hip arthritis: lt  Hypertension  No significant past surgical history      MEDICATIONS  (STANDING):  ALBUTerol/ipratropium for Nebulization 3 milliLiter(s) Nebulizer every 8 hours  enoxaparin Injectable 70 milliGRAM(s) SubCutaneous every 12 hours  midodrine 15 milliGRAM(s) Oral every 8 hours  norepinephrine Infusion 0.03 MICROgram(s)/kG/Min (4.084 mL/Hr) IV Continuous <Continuous>  pantoprazole    Tablet 40 milliGRAM(s) Oral before breakfast  piperacillin/tazobactam IVPB. 3.375 Gram(s) IV Intermittent every 8 hours  potassium chloride    Tablet ER 10 milliEquivalent(s) Oral daily  senna 2 Tablet(s) Oral at bedtime    MEDICATIONS  (PRN):  albumin human 25% IVPB 50 milliLiter(s) IV Intermittent once PRN see below  oxyCODONE    5 mG/acetaminophen 325 mG 1 Tablet(s) Oral every 4 hours PRN Moderate Pain (4 - 6)      Allergies    No Known Allergies    Intolerances        REVIEW OF SYSTEMS:    CONSTITUTIONAL: No weakness, fevers or chills.   EYES/ENT: No visual changes;  No vertigo or throat pain   NECK: No pain or stiffness  RESPIRATORY: No cough, wheezing, hemoptysis; No shortness of breath  CARDIOVASCULAR: No chest pain or palpitations  GASTROINTESTINAL: No abdominal or epigastric pain. No nausea, vomiting, or hematemesis; No diarrhea or constipation. No melena or hematochezia.  GENITOURINARY: No dysuria, frequency or hematuria  NEUROLOGICAL: No numbness or weakness  SKIN: No itching, burning, rashes, or lesions   All other review of systems is negative unless indicated above    Vital Signs Last 24 Hrs  T(C): 36.4 (27 Apr 2018 07:52), Max: 38.6 (26 Apr 2018 12:00)  T(F): 97.5 (27 Apr 2018 07:52), Max: 101.5 (26 Apr 2018 12:00)  HR: 105 (27 Apr 2018 10:00) (95 - 134)  BP: 115/80 (27 Apr 2018 10:00) (79/56 - 145/58)  BP(mean): 92 (27 Apr 2018 10:00) (62 - 98)  RR: 21 (27 Apr 2018 10:00) (21 - 30)  SpO2: 100% (27 Apr 2018 10:00) (85% - 100%)    I&O's Summary    26 Apr 2018 07:01  -  27 Apr 2018 07:00  --------------------------------------------------------  IN: 1584.3 mL / OUT: 700 mL / NET: 884.3 mL    27 Apr 2018 07:01  -  27 Apr 2018 11:13  --------------------------------------------------------  IN: 130 mL / OUT: 60 mL / NET: 70 mL        PHYSICAL EXAM:  Constitutional: acute distress, awake, alert  Neurological: Alert,   HEENT: no JVD, EOMI  Cardiovascular: irr, S1 and S2, tachy  Pulmonary: breath sounds bilaterally rhonchi  Gastrointestinal: Bowel Sounds present, soft, nontender  EXT:  peripheral edema  Skin: No rashes.  Psych:  Mood & affect appropriate    LABS: All Labs Reviewed:                        13.0   41.1  )-----------( 129      ( 27 Apr 2018 08:14 )             39.8     04-27    139  |  102  |  29<H>  ----------------------------<  106<H>  5.0   |  22  |  1.90<H>    Ca    7.9<L>      27 Apr 2018 05:27  Phos  5.2     04-27  Mg     1.9     04-27    TPro  6.0  /  Alb  2.9<L>  /  TBili  3.9<H>  /  DBili  x   /  AST  673<H>  /  ALT  109<H>  /  AlkPhos  261<H>  04-27    PT/INR - ( 27 Apr 2018 05:27 )   PT: 18.6 sec;   INR: 1.69 ratio         PTT - ( 27 Apr 2018 05:27 )  PTT:67.3 sec  CARDIAC MARKERS ( 26 Apr 2018 09:57 )  .032 ng/mL / x     / 94 U/L / x     / 1.3 ng/mL  CARDIAC MARKERS ( 26 Apr 2018 06:37 )  <.015 ng/mL / x     / 72 U/L / x     / 1.4 ng/mL      Blood Culture: Organism --  Gram Stain Blood -- Gram Stain   No polymorphonuclear cells seen per low power field  No organisms seen per oil power field  by cytocentrifuge  Specimen Source Peritoneal Peritoneal Fluid  Culture-Blood --    · Assessment		  72M getting work up for severe left Hip OA and replacement found with elevated liver chemistries, Right lower costal pain, with sono noted for liver mass, portal vein thrombosis. CT in hospital shows lung masses in addition to liver mass and ascites.  Afib with better rapid rate, relatively new onset (was in NSR 3/13/2018), chronic edema Acute on chronic diastolic HF (HFpEF), diffuse ST and T abnormalities on EKG, could be ischemic changes. These were noted before.    Pt now in ICU   on norepinephrine Infusion 0.03 MICROgram(s)/kG/Min (4.084 mL/Hr) IV Continuous <Continuous>  BB held sec to hypotension  Pt mildly tachycardic -110 bpm, AF  s/p IV digoxin yesterday  Would add dig 0.125 mg PO qday  No Amio sec to cirrhosis    Troponin negative for MI thus    Echo with rapid heart rates and possible anterior wall hypokinesis with EF 50-55%.  continue asa 81     Edema Ascites Pleural effusion/ CHF?/cirrhosis  - chronic edema despite lasix.  Hold lasix now sec to lowish BP.    Awaiting work up of liver mass   Eventual lung bx, too unstable to go today.   Lovenox ordered    Overall guarded prognosis.

## 2018-04-27 NOTE — DIETITIAN INITIAL EVALUATION ADULT. - SIGNS/SYMPTOMS
evidenced by pts report of ~12# wt loss past month evidenced by new liver & ?lung Ca, pressure injuries

## 2018-04-27 NOTE — DIETITIAN INITIAL EVALUATION ADULT. - ETIOLOGY
related to intake less than estimated needs, catabolic illness related to catabolic illness and increased demand for wound healing

## 2018-04-27 NOTE — GOALS OF CARE CONVERSATION - PERSONAL ADVANCE DIRECTIVE - CONVERSATION DETAILS
pt wishes for comfort care, does not want to pursue further testing  see today's critical care note for full details

## 2018-04-27 NOTE — DIETITIAN INITIAL EVALUATION ADULT. - ORAL INTAKE PTA
Pt reports has had no appetite for about past month. Has been having only 2 yogurts and a malted made from ice cream and an ensure shake once daily.  Pt reports has never been a big eater, normally would only eat 1 meal a day (dinner) with coffee in morning. Maybe would have a snack or something during day./poor

## 2018-04-27 NOTE — PROGRESS NOTE ADULT - SUBJECTIVE AND OBJECTIVE BOX
24 hour events: Patient transferred to ICU after Rapid Response for episodes of Afib with rvr, hypotension, and tachypnea. During rapid response, patient was given cardizem gtt, lopressor, and started on digoxin loading dose. Patient was started on levophed overnight for pressure support. Received albumin, IVF overnight. Of note, prior to rapid response, patient had a febrile episode with Tmax of 104.4. Also noted that patient's WBC showed significant elevation today AM to 43 from 6.9. On evaluation at bedside, patient states that he is feeling better and wants to go home. Family at bedside wishes to discuss goal of care.     Review of Systems:  Constitutional: No fever, chills, fatigue  Neuro: No headache, numbness, weakness  Resp: No cough, wheezing, shortness of breath  CVS: No chest pain, palpitations, leg swelling  GI: +mild unspecific epigastric discomfort, no nausea, vomiting, diarrhea   : No dysuria, frequency, incontinence      T(F): 98.2 (04-27-18 @ 12:00), Max: 99 (04-26-18 @ 14:00)  HR: 108 (04-27-18 @ 12:30) (95 - 134)  BP: 96/68 (04-27-18 @ 12:30) (81/58 - 145/58)  RR: 30 (04-27-18 @ 12:30) (21 - 32)  SpO2: 95% (04-27-18 @ 12:30) (85% - 100%)  Wt(kg): --        CAPILLARY BLOOD GLUCOSE  POCT Blood Glucose.: 87 mg/dL (26 Apr 2018 09:18)      I&O's Detail    26 Apr 2018 07:01  -  27 Apr 2018 07:00  --------------------------------------------------------  IN:    norepinephrine Infusion: 484.3 mL    Oral Fluid: 450 mL    Solution: 650 mL  Total IN: 1584.3 mL    OUT:    Voided: 700 mL  Total OUT: 700 mL    Total NET: 884.3 mL      27 Apr 2018 07:01  -  27 Apr 2018 13:24  --------------------------------------------------------  IN:    norepinephrine Infusion: 30 mL    Oral Fluid: 100 mL  Total IN: 130 mL    OUT:    Voided: 60 mL  Total OUT: 60 mL    Total NET: 70 mL          Physical Exam:   Gen: NAD, lying in bed comfortable, speaking in full sentences  Neuro: AAOx3, no focal deficits   HEENT: +scleral icterus, NCAT  Resp: mild coarse breathsounds b/l  CVS: irregular, tachy, +S1/S2  Abd: Soft, nontender, no guarding, mild unspecific epigastric discomfort on palpation, +BS  Ext: 1+ pitting edema b/l LE  Skin: +jaundice, warm     Meds:  piperacillin/tazobactam IVPB. IV Intermittent  vancomycin  IVPB IV Intermittent    midodrine Oral  norepinephrine Infusion IV Continuous      ALBUTerol/ipratropium for Nebulization Nebulizer    oxyCODONE    5 mG/acetaminophen 325 mG Oral PRN      enoxaparin Injectable SubCutaneous    pantoprazole    Tablet Oral  senna Oral      albumin human 25% IVPB IV Intermittent PRN                            13.0   41.1  )-----------( 129      ( 27 Apr 2018 08:14 )             39.8     Bands 10.0  Bands 8.0    04-27    139  |  102  |  29<H>  ----------------------------<  106<H>  5.0   |  22  |  1.90<H>    Ca    7.9<L>      27 Apr 2018 05:27  Phos  5.2     04-27  Mg     1.9     04-27    TPro  6.0  /  Alb  2.9<L>  /  TBili  3.9<H>  /  DBili  x   /  AST  673<H>  /  ALT  109<H>  /  AlkPhos  261<H>  04-27      CARDIAC MARKERS ( 26 Apr 2018 09:57 )  .032 ng/mL / x     / 94 U/L / x     / 1.3 ng/mL  CARDIAC MARKERS ( 26 Apr 2018 06:37 )  <.015 ng/mL / x     / 72 U/L / x     / 1.4 ng/mL      PT/INR - ( 27 Apr 2018 05:27 )   PT: 18.6 sec;   INR: 1.69 ratio         PTT - ( 27 Apr 2018 05:27 )  PTT:67.3 sec    Peritoneal Peritoneal Fluid   No growth to date   No polymorphonuclear cells seen per low power field  No organisms seen per oil power field  by cytocentrifuge 04-25 @ 16:51      Radiology:   < from: CT Angio Chest w/ IV Cont (04.26.18 @ 07:55) >  EXAM:  CT ANGIO CHEST (W)AW IC                        PROCEDURE DATE:  04/26/2018      INTERPRETATION:  CLINICAL INFORMATION:  Shortness of breath.  PROCEDURE:  Using multislice helical technique,  CT angiography, 1.5 mm   sections were obtained  following the intravenous administration of 94   ccs of Omnipaque 350.  Multiplanar MIP reconstructed images were obtained.  FINDINGS:      Comparison: CT scan 4/23/2018.    There is large conglomerate low-density mediastinal and hilar mass,   involving the right paratracheal, AP mediastinal window, subcarinal and   right hilum, which encases and narrows the truncus anteriosus and   bronchus intermedius.  No intraluminal pulmonary artery filling defects are noted.    There is bilateral centrilobular emphysematous disease.  Again noted is an approximately 2.5 cm mass with spiculated margins at   the periphery of the anterior right upper lobe. This extends to the   pleural surface. There is a small left-sided pleural effusion with   underlying compressive atelectasis.  There is a trace right-sided pleural effusion.    Limited images that include the upper abdomen demonstrate abdominal   ascites.  There is a poorly defined mass right hepatic lobe with small foci of air,   likely related to recent biopsy.    Impression:  Conglomerate mediastinal and hilar lymphadenopathy, which is narrowing   the pulmonary truncus arteriosus, without CT evidence of acute pulmonary   emboli.    Spiculated right upper lobe mass, suspicious for lung neoplasm.    Recommend further evaluation with PET/CT scan.    Other findings as discussed above.    ISIS COLÓN M.D., ATTENDING RADIOLOGIST  This document has been electronically signed. Apr 26 2018  8:15AM  < end of copied text >      CENTRAL LINE: N        SMILEY: N  A-LINE: N    GLOBAL ISSUE/BEST PRACTICE:  Analgesia: Y  Sedation: N  HOB elevation: yes  Stress ulcer prophylaxis: Y  VTE prophylaxis: Y  Nutrition: regular    CODE STATUS: DNR/DNI 24 hour events: Patient transferred to ICU after Rapid Response for episodes of Afib with rvr, hypotension, and tachypnea. During rapid response, patient was given cardizem gtt, lopressor, and started on digoxin loading dose. Patient was started on levophed overnight for pressure support. Received albumin, IVF overnight. Of note, during RRT patient had a febrile episode with Tmax of 104.4. Also noted that patient's WBC showed significant elevation today AM to 43 from 6.9. On evaluation at bedside, patient states that he is feeling better and wants to go home. Family at bedside wishes to discuss goal of care.     Review of Systems:  Constitutional: No fever, chills, fatigue  Neuro: No headache, numbness, weakness  Resp: No cough, wheezing, shortness of breath  CVS: No chest pain, palpitations, leg swelling  GI: +mild unspecific epigastric discomfort, no nausea, vomiting, diarrhea   : No dysuria, frequency, incontinence      T(F): 98.2 (04-27-18 @ 12:00), Max: 99 (04-26-18 @ 14:00)  HR: 108 (04-27-18 @ 12:30) (95 - 134)  BP: 96/68 (04-27-18 @ 12:30) (81/58 - 145/58)  RR: 30 (04-27-18 @ 12:30) (21 - 32)  SpO2: 95% (04-27-18 @ 12:30) (85% - 100%)  Wt(kg): --        CAPILLARY BLOOD GLUCOSE  POCT Blood Glucose.: 87 mg/dL (26 Apr 2018 09:18)      I&O's Detail    26 Apr 2018 07:01  -  27 Apr 2018 07:00  --------------------------------------------------------  IN:    norepinephrine Infusion: 484.3 mL    Oral Fluid: 450 mL    Solution: 650 mL  Total IN: 1584.3 mL    OUT:    Voided: 700 mL  Total OUT: 700 mL    Total NET: 884.3 mL      27 Apr 2018 07:01  -  27 Apr 2018 13:24  --------------------------------------------------------  IN:    norepinephrine Infusion: 30 mL    Oral Fluid: 100 mL  Total IN: 130 mL    OUT:    Voided: 60 mL  Total OUT: 60 mL    Total NET: 70 mL          Physical Exam:   Gen: NAD, lying in bed comfortable, speaking in full sentences  Neuro: AAOx3, no focal deficits   HEENT: +scleral icterus, NCAT  Resp: mild coarse breathsounds b/l  CVS: irregular, tachy, +S1/S2  Abd: Soft, nontender, no guarding, mild unspecific epigastric discomfort on palpation, +BS  Ext: 1+ pitting edema b/l LE  Skin: +jaundice, warm     Meds:  piperacillin/tazobactam IVPB. IV Intermittent  vancomycin  IVPB IV Intermittent    midodrine Oral  norepinephrine Infusion IV Continuous      ALBUTerol/ipratropium for Nebulization Nebulizer    oxyCODONE    5 mG/acetaminophen 325 mG Oral PRN      enoxaparin Injectable SubCutaneous    pantoprazole    Tablet Oral  senna Oral      albumin human 25% IVPB IV Intermittent PRN                            13.0   41.1  )-----------( 129      ( 27 Apr 2018 08:14 )             39.8     Bands 10.0  Bands 8.0    04-27    139  |  102  |  29<H>  ----------------------------<  106<H>  5.0   |  22  |  1.90<H>    Ca    7.9<L>      27 Apr 2018 05:27  Phos  5.2     04-27  Mg     1.9     04-27    TPro  6.0  /  Alb  2.9<L>  /  TBili  3.9<H>  /  DBili  x   /  AST  673<H>  /  ALT  109<H>  /  AlkPhos  261<H>  04-27      CARDIAC MARKERS ( 26 Apr 2018 09:57 )  .032 ng/mL / x     / 94 U/L / x     / 1.3 ng/mL  CARDIAC MARKERS ( 26 Apr 2018 06:37 )  <.015 ng/mL / x     / 72 U/L / x     / 1.4 ng/mL      PT/INR - ( 27 Apr 2018 05:27 )   PT: 18.6 sec;   INR: 1.69 ratio         PTT - ( 27 Apr 2018 05:27 )  PTT:67.3 sec    Peritoneal Peritoneal Fluid   No growth to date   No polymorphonuclear cells seen per low power field  No organisms seen per oil power field  by cytocentrifuge 04-25 @ 16:51        Cytopathology Non-Gyn Final Report -  (04.25.18 @ 11:36)    Cytopathology Non-Gyn Final Report - :   ACCESSION No:  [id number]    DINOE PALMER  Specimen Description  Peritoneal fluid      Clinical Information  Ascites, liver mass  Procedure: Paracentesis, liver biopsy      Gross Description  75 cc cloudy yellow fluid received in 50% alcohol      Statement of Adequacy  SATISFACTORY FOR INTERPRETATION      Final Diagnosis  NEGATIVE FOR MALIGNANT CELLS    MD Akila Walters MD  (Electronic Signature)  Reported on: 04/26/18  ________________________________________________________________        Radiology:   < from: CT Angio Chest w/ IV Cont (04.26.18 @ 07:55) >  EXAM:  CT ANGIO CHEST (W)AW IC                        PROCEDURE DATE:  04/26/2018      INTERPRETATION:  CLINICAL INFORMATION:  Shortness of breath.  PROCEDURE:  Using multislice helical technique,  CT angiography, 1.5 mm   sections were obtained  following the intravenous administration of 94   ccs of Omnipaque 350.  Multiplanar MIP reconstructed images were obtained.  FINDINGS:      Comparison: CT scan 4/23/2018.    There is large conglomerate low-density mediastinal and hilar mass,   involving the right paratracheal, AP mediastinal window, subcarinal and   right hilum, which encases and narrows the truncus anteriosus and   bronchus intermedius.  No intraluminal pulmonary artery filling defects are noted.    There is bilateral centrilobular emphysematous disease.  Again noted is an approximately 2.5 cm mass with spiculated margins at   the periphery of the anterior right upper lobe. This extends to the   pleural surface. There is a small left-sided pleural effusion with   underlying compressive atelectasis.  There is a trace right-sided pleural effusion.    Limited images that include the upper abdomen demonstrate abdominal   ascites.  There is a poorly defined mass right hepatic lobe with small foci of air,   likely related to recent biopsy.    Impression:  Conglomerate mediastinal and hilar lymphadenopathy, which is narrowing   the pulmonary truncus arteriosus, without CT evidence of acute pulmonary   emboli.    Spiculated right upper lobe mass, suspicious for lung neoplasm.    Recommend further evaluation with PET/CT scan.    Other findings as discussed above.    ISIS COLÓN M.D., ATTENDING RADIOLOGIST  This document has been electronically signed. Apr 26 2018  8:15AM  < end of copied text >      CENTRAL LINE: N        SMILEY: N  A-LINE: N    GLOBAL ISSUE/BEST PRACTICE:  Analgesia: Y  Sedation: N  HOB elevation: yes  Stress ulcer prophylaxis: Y  VTE prophylaxis: Y  Nutrition: regular    CODE STATUS: DNR/DNI

## 2018-04-27 NOTE — PROGRESS NOTE ADULT - PROBLEM SELECTOR PLAN 2
-? secondary to ETOH vs HCV  -HCV ab reactive, HCV RNA +, viral load [id number],  f/u genotype  -f/u autoimmune labs- pending/in progress  -ETOH abstinence -? secondary to ETOH vs HCV  -HCV ab reactive, HCV RNA +, viral load [id number],  f/u genotype  -hcv rna repeated and neg?? recommend to repeat labs to confirm hcv status  -f/u autoimmune labs- pending/in progress  -ETOH abstinence

## 2018-04-27 NOTE — DIETITIAN INITIAL EVALUATION ADULT. - PERTINENT LABORATORY DATA
4/27 WBC 41.1, BUN 29, Cre 1.9, Glu 106, Ca 7.9, eGFR 34, Alb 2.9, Phos 5.2, Alk Phos and AST/ALT elevated

## 2018-04-27 NOTE — PROGRESS NOTE ADULT - PROBLEM SELECTOR PLAN 5
-elevated leukocytosis, cdiff specimen pending, care per icu -elevated leukocytosis, cdiff specimen pending, care per icu/ID  -cont abx -elevated leukocytosis, source unclear, s/p bx/para 4/25, cdiff specimen pending, care per icu/ID  -cont abx

## 2018-04-27 NOTE — PROGRESS NOTE ADULT - SUBJECTIVE AND OBJECTIVE BOX
VITALS/LABS  4/27/2018 97 110 92/64   4/27/2018 W 41 Hb 13 Plt 129  Na 139 K 5 CO2 22 Cr 1.9     REVIEW OF SYMPTOMS    Able to give ROS  Yes     RELIABLE No   CONSTITUTIONAL Weakness Yes  Chills No Vision changes No  ENDOCRINE No unexplained hair loss No heat or cold intolerance    ALLERGY No hives  Sore throat No   RESP Coughing blood no  Shortness of breath YES   NEURO No Headache  Confusion Pain neck No   CARDIAC No Chest pain No Palpitations   GI No Pain abdomen NO   Vomiting NO     PHYSICAL EXAM    HEENT Unremarkable PERRLA atraumatic   RESP Fair air entry EXP prolonged    Harsh breath sound Resp distres mild   CARDIAC S1 S2 No S3     NO JVD    ABDOMEN SOFT BS PRESENT NOT DISTENDED No hepatosplenomegaly PEDAL EDEMA present No calf tenderness  NO rash   GENERAL Not TOXIC looking    GLOBAL ISSUE/BEST PRACTICE:        PROBLEM: Analgesia:     na                        PROBLEM: Sedation:     na               PROBLEM: HOB elevation:   y            PROBLEM: Stress ulcer proph:    na                      PROBLEM: VTE prophylaxis:      Lvnx 70.2 (4/23)   PROBLEM: Glycemic control:    na  PROBLEM: Nutrition:    reg diet (4/23)   PROBLEM: Advanced directive: na     PROBLEM: Allergies:  na    BRIEF PATIENT DESCRIPTION ADMISSION  72 male former smoker PMH CHF Hip arthritis Htn sent to ER for evaluation of elevated bilirubin and liver enzymes found during preop testing for hip replacement . Patient states for the last month he has had lower extremity edema, for the past week he has had exertional dyspnea and for the past 2 days he has had right upper quadrant discomfort.  BACKGROUND 4/23/2018 Pt is fully active puts up fences till upto a few d pta Former 2 ppd smokermoderate ETOH use PMH Rheumatoid arthritis sees Dr Dickey  HOME MEDS atenolol 50 lasix 20 losartan 25 tramadol 50.2   ER Vitals 4/23/2018 afeb 140 120/90 72        PROBLEM BASES ASSESSMENT PLAN 4/23/2018 ADMISSION [facility]   NOTE Pt tr to ICU 4/26  NOTE In accordance with  [facility] VS policy ICU final medical management decisions/MD orders are  determined/done only by ICU Intensivists    72 m with hilomediastinal parenchymal lung and liver masses with ascites admitted 4/23 with dyspnea  Likely has metaastatic hcc Prognosis grave   Pt had CTNB liver and paracentesis 4/25  CTNB lung mass  option explored 4/26 with Dr Dodge is not safely accessible  Pt has AFRVR and seems too unatabe for bronch at this point (dw DR Mclean on 4/26 am    DYSPNEA   4/26/2018 100% 743/25/260   4/24 RA 96%   4/23 Check echo ro chf check v duplex legs ro dvt   4/23 O2 Monitor po  4/24 V duplex legs n   COPD  Duoneb.3 (4/23)   SEPSIS   Zosyn (4/26)     AF RVR   4/24 Cardio plans maximizing bb and if hemodynamic instability then DCCV   4/24 Pt on ac for pvt   RO MI   4/23 Tr 1 n.2   Metoprolol 50.4 (4/25)   CHF  4/24/2018 ECHO ef 50% Mod mr rvsp 20 large bl effsns   Lasix 40 (4/23)   HILOMEDIASTINAL PARENCHYMAL LUNG MASS AND LIVER MASS WITH ASCITES  4/25 Recd call from [facility] that they need lung bx to determine wheher pt has second primary Willdw Dr Dodge Bronch would  be dewayne risky in setting of cardiac problems   4/25 Pt had liver bx as well as paracentesis Results pending   4/23 cea 10.6   4/23 afp 51.5   4/23 Suggest paracentesis or liver biopsy before pulmonary invasive testing  4/24 case dw Dr Dodge Plan is for IR to do nb of liver and paracentesis     PORTAL VEIN THROMBOSIS   Lvnx 70.2 (4/23)   HEP C   4/23 Hep C ab pos     TIME SPENT Over 25 minutes aggregate care time spent on encounter; activities included   direct patient care, counseling and/or coordinating care reviewing notes, lab data/ imaging , discussion with multidisciplinary team/ patient  /family. Risks, benefits, alternatives  discussed in detail. Proper antibiotic use issues addressed Questions/concerns  were addressed  as  best as possible As applicable to this patient the following issues were addressed Pain was  assessed and addresed.Pt was screened for signs of clinical depression .Appropriate referral made.Risk of falls assessed.Fall prevention d/w family .Pt was screened for tobacco and etoh,counseling was done for etoh and tobacco cessation as applicable .Use of narcotic pain meds was discussed as applicable including  to use narcotic meds  wisely and to avoid overusing them

## 2018-04-27 NOTE — PROGRESS NOTE ADULT - ASSESSMENT
71 yo M with hx of HCV, cirrhosis, presented with abdominal pain, SOB and was found to have7 cm  right hepatic mass with extensive tumoral thrombosis, ascites, spiculated RUL mass with confluent right hilar and mediastinal lymphadenopathy.    Findings are concerning for  advanced malignancy.   s/p CT guided  paracentesis and liver Bx by Dr Dupree   cytology path was sent from fluid, which showed no malignant cells.   Pending pathology from liver Bx. CEA 10, AFP 51.     Had d/w Dr Machuca: possible need tissue from the lung (to be decided btw Dr Dodge and Dr Machuca method to obtain. But now pt rather go home, not go for doctors appointments. .     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 Worsening renal failure.     RECOMMEND  Continue lovenox.   Palliative care eval.   Hospice referral.   Await path from liver biopsy.   supportive measure per ICU  Emotional support, corbin with wife.   continue medical and cardiac evaluation

## 2018-04-27 NOTE — PROGRESS NOTE ADULT - SUBJECTIVE AND OBJECTIVE BOX
INTERVAL HX:     Chart reviewed and events noted;     MEDICATIONS  (STANDING):  ALBUTerol/ipratropium for Nebulization 3 milliLiter(s) Nebulizer every 8 hours  digoxin     Tablet 0.125 milliGRAM(s) Oral daily  dronabinol 2.5 milliGRAM(s) Oral two times a day  enoxaparin Injectable 70 milliGRAM(s) SubCutaneous every 12 hours  lidocaine   Patch 2 Patch Transdermal daily  midodrine 15 milliGRAM(s) Oral every 8 hours  norepinephrine Infusion 0.03 MICROgram(s)/kG/Min (4.084 mL/Hr) IV Continuous <Continuous>  pantoprazole    Tablet 40 milliGRAM(s) Oral before breakfast  piperacillin/tazobactam IVPB. 3.375 Gram(s) IV Intermittent every 8 hours  senna 2 Tablet(s) Oral at bedtime    MEDICATIONS  (PRN):  albumin human 25% IVPB 50 milliLiter(s) IV Intermittent once PRN see below  oxyCODONE    5 mG/acetaminophen 325 mG 1 Tablet(s) Oral every 4 hours PRN Moderate Pain (4 - 6)      Vital Signs Last 24 Hrs  T(C): 36.3 (27 Apr 2018 15:31), Max: 36.8 (27 Apr 2018 12:00)  T(F): 97.4 (27 Apr 2018 15:31), Max: 98.2 (27 Apr 2018 12:00)  HR: 107 (27 Apr 2018 18:00) (95 - 123)  BP: 89/60 (27 Apr 2018 18:00) (88/69 - 145/58)  BP(mean): 70 (27 Apr 2018 18:00) (70 - 98)  RR: 23 (27 Apr 2018 18:00) (19 - 34)  SpO2: 94% (27 Apr 2018 18:00) (85% - 100%)    PHYSICAL EXAM  General: WN WD adult in NAD  HEENT: clear oropharynx, anicteric sclera, pink conjunctivae  Neck: supple  CV: normal S1S2, RRR, no murmur, no rubs, no gallops  Lungs: clear to auscultation BL, no wheezes, no rales, no rhonchi  Abdomen: soft, non-tender, non-distended, no hepatosplenomegaly  Ext: no clubbing, no cyanosis, no edema  Skin: no rashes, no petichiae  Neuro: alert and oriented X3, no focal deficits      LABS:                        13.0   41.1  )-----------( 129      ( 27 Apr 2018 08:14 )             39.8     04-27    139  |  102  |  29<H>  ----------------------------<  106<H>  5.0   |  22  |  1.90<H>    Ca    7.9<L>      27 Apr 2018 05:27  Phos  5.2     04-27  Mg     1.9     04-27    TPro  6.0  /  Alb  2.9<L>  /  TBili  3.9<H>  /  DBili  x   /  AST  673<H>  /  ALT  109<H>  /  AlkPhos  261<H>  04-27    PT/INR - ( 27 Apr 2018 05:27 )   PT: 18.6 sec;   INR: 1.69 ratio         PTT - ( 27 Apr 2018 05:27 )  PTT:67.3 sec      RADIOLOGY & ADDITIONAL STUDIES: INTERVAL HX:   Chart reviewed and events noted;   wife at bedside. Appears conflicted about situation.   States pt does not want treatment given has cancer, but then mentions immunotherapy, and goes on to offer about pt losing weight, and other subacute sx.    pt denies complaints.     s/p RRT yesterday. Now transferred to ICU.  On AM evaluatio, patient states that he is feeling better and wants to go home. Family at bedside wishes to discuss goal of care.  Patient and family wants no aggressive measures, hospice evaluation, and DNR/DNI per AM rounds. .ICU team had family meeting with sister (HCP), pt, and wife.  He wants to go home and live comfortably.  He is not interested in further workup because he does not want to spend time going to doctor visits and in the hospital.  He would like to go home asap.  It was discussed that he is currently on low dose of vasopressors.  Plan to try to wean off today and then may possibly d/c to home hospice with PO abx.  His sister and wife are in agreement with this plan.  Will make hospice referral.  Made DNR/DNI.    --pt critically ill.      MEDICATIONS  (STANDING):  ALBUTerol/ipratropium for Nebulization 3 milliLiter(s) Nebulizer every 8 hours  digoxin     Tablet 0.125 milliGRAM(s) Oral daily  dronabinol 2.5 milliGRAM(s) Oral two times a day  enoxaparin Injectable 70 milliGRAM(s) SubCutaneous every 12 hours  lidocaine   Patch 2 Patch Transdermal daily  midodrine 15 milliGRAM(s) Oral every 8 hours  norepinephrine Infusion 0.03 MICROgram(s)/kG/Min (4.084 mL/Hr) IV Continuous <Continuous>  pantoprazole    Tablet 40 milliGRAM(s) Oral before breakfast  piperacillin/tazobactam IVPB. 3.375 Gram(s) IV Intermittent every 8 hours  senna 2 Tablet(s) Oral at bedtime    MEDICATIONS  (PRN):  albumin human 25% IVPB 50 milliLiter(s) IV Intermittent once PRN see below  oxyCODONE    5 mG/acetaminophen 325 mG 1 Tablet(s) Oral every 4 hours PRN Moderate Pain (4 - 6)      Vital Signs Last 24 Hrs  T(C): 36.3 (27 Apr 2018 15:31), Max: 36.8 (27 Apr 2018 12:00)  T(F): 97.4 (27 Apr 2018 15:31), Max: 98.2 (27 Apr 2018 12:00)  HR: 107 (27 Apr 2018 18:00) (95 - 123)  BP: 89/60 (27 Apr 2018 18:00) (88/69 - 145/58)  BP(mean): 70 (27 Apr 2018 18:00) (70 - 98)  RR: 23 (27 Apr 2018 18:00) (19 - 34)  SpO2: 94% (27 Apr 2018 18:00) (85% - 100%)    PHYSICAL EXAM  General: WN WD adult in NAD  HEENT: clear oropharynx, icteric, sclera, pink conjunctivae  Neck: supple  CV: normal S1S2, RRR, no murmur, no rubs, no gallops  Lungs: clear to auscultation BL, no wheezes, no rales, no rhonchi  Abdomen: soft, non-tender, moderately-distended,.  Ext: no clubbing, no cyanosis, no edema  Skin: no rashes, no petichiae  Neuro: alert and oriented X3, no focal deficits      LABS:                        13.0   41.1  )-----------( 129      ( 27 Apr 2018 08:14 )             39.8     04-27    139  |  102  |  29<H>  ----------------------------<  106<H>  5.0   |  22  |  1.90<H>    Ca    7.9<L>      27 Apr 2018 05:27  Phos  5.2     04-27  Mg     1.9     04-27    TPro  6.0  /  Alb  2.9<L>  /  TBili  3.9<H>  /  DBili  x   /  AST  673<H>  /  ALT  109<H>  /  AlkPhos  261<H>  04-27    PT/INR - ( 27 Apr 2018 05:27 )   PT: 18.6 sec;   INR: 1.69 ratio         PTT - ( 27 Apr 2018 05:27 )  PTT:67.3 sec      RADIOLOGY & ADDITIONAL STUDIES:

## 2018-04-27 NOTE — PROGRESS NOTE ADULT - PROBLEM SELECTOR PLAN 1
-AFP/CEA elevated  -trend hepatic panel  -s/p IR guided bx/paracentesis 4/25; para revealed clear fluid, micro neg, cx no growth to date, cyto neg  -f/u bx results  -f/u heme/onc, pending lung bx -AFP/CEA elevated  -trend hepatic panel, can add nely 200mg tid for elev bili  -s/p IR guided bx/paracentesis 4/25; para revealed clear fluid, micro neg, cx no growth to date, cyto neg  -f/u bx results  -f/u heme/onc, pending lung bx

## 2018-04-27 NOTE — DIETITIAN INITIAL EVALUATION ADULT. - OTHER INFO
Pt reports UBW for a long time of ~172#. Started losing wt past month or so concurrent with decreased po intake. Pt reports current wt ~160#, todays wt noted as 164#.

## 2018-04-27 NOTE — PROGRESS NOTE ADULT - SUBJECTIVE AND OBJECTIVE BOX
INTERVAL HPI/OVERNIGHT EVENTS:  pt seen and examined  s/p rrt yesterday for afib w rvr and hypoxia, transferred to icu  currently denies n/v/c, c/o mild ruq pain +gas  per rn afebrile overnight no vomiting no s/s gib    MEDICATIONS  (STANDING):  ALBUTerol/ipratropium for Nebulization 3 milliLiter(s) Nebulizer every 8 hours  enoxaparin Injectable 70 milliGRAM(s) SubCutaneous every 12 hours  midodrine 15 milliGRAM(s) Oral every 8 hours  norepinephrine Infusion 0.03 MICROgram(s)/kG/Min (4.084 mL/Hr) IV Continuous <Continuous>  pantoprazole    Tablet 40 milliGRAM(s) Oral before breakfast  piperacillin/tazobactam IVPB. 3.375 Gram(s) IV Intermittent every 8 hours  potassium chloride    Tablet ER 10 milliEquivalent(s) Oral daily  senna 2 Tablet(s) Oral at bedtime    MEDICATIONS  (PRN):  albumin human 25% IVPB 50 milliLiter(s) IV Intermittent once PRN see below  oxyCODONE    5 mG/acetaminophen 325 mG 1 Tablet(s) Oral every 4 hours PRN Moderate Pain (4 - 6)      Allergies    No Known Allergies    Intolerances        Review of Systems:    General:  No wt loss, fevers, chills, night sweats, fatigue   Eyes:  Good vision, no reported pain  ENT:  No sore throat, pain, runny nose, dysphagia  CV:  No pain, palpitations, hypo/hypertension  Resp:  No dyspnea, cough, tachypnea, wheezing  GI:  passing gas, mild abd pain, No nausea, No vomiting, No diarrhea, No constipation, No weight loss, No fever, No pruritis, No rectal bleeding, No melena, No dysphagia  :  No pain, bleeding, incontinence, nocturia  Muscle:  No pain, weakness  Neuro:  No weakness, tingling, memory problems  Psych:  No fatigue, insomnia, mood problems, depression  Endocrine:  No polyuria, polydypsia, cold/heat intolerance  Heme:  No petechiae, ecchymosis, easy bruisability  Skin:  No rash, tattoos, scars, edema      Vital Signs Last 24 Hrs  T(C): 36.4 (27 Apr 2018 07:52), Max: 40.2 (26 Apr 2018 10:36)  T(F): 97.5 (27 Apr 2018 07:52), Max: 104.4 (26 Apr 2018 10:36)  HR: 97 (27 Apr 2018 07:17) (96 - 161)  BP: 110/75 (27 Apr 2018 07:00) (79/56 - 145/58)  BP(mean): 90 (27 Apr 2018 07:00) (62 - 98)  RR: 22 (27 Apr 2018 07:00) (22 - 37)  SpO2: 95% (27 Apr 2018 07:17) (85% - 100%)    PHYSICAL EXAM:    Constitutional: NAD, oob in chair  HEENT: EOMI, +scleral icterus  Neck: No LAD, supple  Respiratory: dec bs  Cardiovascular: S1 and S2, irreg  Gastrointestinal: BS+, soft, mild ttp ruq+dt  Extremities: +mild le edema   Vascular: 2+ peripheral pulses  Neurological: A/O x 3  Skin: +jaundice    LABS:                        13.0   41.1  )-----------( 129      ( 27 Apr 2018 08:14 )             39.8     04-27    139  |  102  |  29<H>  ----------------------------<  106<H>  5.0   |  22  |  1.90<H>    Ca    7.9<L>      27 Apr 2018 05:27  Phos  5.2     04-27  Mg     1.9     04-27    TPro  6.0  /  Alb  2.9<L>  /  TBili  3.9<H>  /  DBili  x   /  AST  673<H>  /  ALT  109<H>  /  AlkPhos  261<H>  04-27    PT/INR - ( 27 Apr 2018 05:27 )   PT: 18.6 sec;   INR: 1.69 ratio         PTT - ( 27 Apr 2018 05:27 )  PTT:67.3 sec      RADIOLOGY & ADDITIONAL TESTS: INTERVAL HPI/OVERNIGHT EVENTS:  pt seen and examined  s/p rrt yesterday for afib w rvr and hypoxia, transferred to icu  currently denies n/v/c, c/o mild ruq pain +gas  per rn afebrile overnight no vomiting no s/s gib    MEDICATIONS  (STANDING):  ALBUTerol/ipratropium for Nebulization 3 milliLiter(s) Nebulizer every 8 hours  enoxaparin Injectable 70 milliGRAM(s) SubCutaneous every 12 hours  midodrine 15 milliGRAM(s) Oral every 8 hours  norepinephrine Infusion 0.03 MICROgram(s)/kG/Min (4.084 mL/Hr) IV Continuous <Continuous>  pantoprazole    Tablet 40 milliGRAM(s) Oral before breakfast  piperacillin/tazobactam IVPB. 3.375 Gram(s) IV Intermittent every 8 hours  potassium chloride    Tablet ER 10 milliEquivalent(s) Oral daily  senna 2 Tablet(s) Oral at bedtime    MEDICATIONS  (PRN):  albumin human 25% IVPB 50 milliLiter(s) IV Intermittent once PRN see below  oxyCODONE    5 mG/acetaminophen 325 mG 1 Tablet(s) Oral every 4 hours PRN Moderate Pain (4 - 6)      Allergies    No Known Allergies    Intolerances        Review of Systems:    General:  No wt loss, fevers, chills, night sweats, fatigue   Eyes:  Good vision, no reported pain  ENT:  No sore throat, pain, runny nose, dysphagia  CV:  No pain, palpitations, hypo/hypertension  Resp:  No dyspnea, cough, tachypnea, wheezing  GI:  passing gas, mild abd pain, No nausea, No vomiting, No diarrhea, No constipation, No weight loss, No fever, No pruritis, No rectal bleeding, No melena, No dysphagia  :  No pain, bleeding, incontinence, nocturia  Muscle:  No pain, weakness  Neuro:  No weakness, tingling, memory problems  Psych:  No fatigue, insomnia, mood problems, depression  Endocrine:  No polyuria, polydypsia, cold/heat intolerance  Heme:  No petechiae, ecchymosis, easy bruisability  Skin:  No rash, tattoos, scars, edema      Vital Signs Last 24 Hrs  T(C): 36.4 (27 Apr 2018 07:52), Max: 40.2 (26 Apr 2018 10:36)  T(F): 97.5 (27 Apr 2018 07:52), Max: 104.4 (26 Apr 2018 10:36)  HR: 97 (27 Apr 2018 07:17) (96 - 161)  BP: 110/75 (27 Apr 2018 07:00) (79/56 - 145/58)  BP(mean): 90 (27 Apr 2018 07:00) (62 - 98)  RR: 22 (27 Apr 2018 07:00) (22 - 37)  SpO2: 95% (27 Apr 2018 07:17) (85% - 100%)    PHYSICAL EXAM:    Constitutional: NAD, oob in chair  HEENT: EOMI, +scleral icterus  Neck: No LAD, supple  Respiratory: dec bs  Cardiovascular: S1 and S2, irreg  Gastrointestinal: BS+, soft, ttp ruq+dt  Extremities: +mild le edema   Vascular: 2+ peripheral pulses  Neurological: A/O x 3  Skin: +jaundice    LABS:                        13.0   41.1  )-----------( 129      ( 27 Apr 2018 08:14 )             39.8     04-27    139  |  102  |  29<H>  ----------------------------<  106<H>  5.0   |  22  |  1.90<H>    Ca    7.9<L>      27 Apr 2018 05:27  Phos  5.2     04-27  Mg     1.9     04-27    TPro  6.0  /  Alb  2.9<L>  /  TBili  3.9<H>  /  DBili  x   /  AST  673<H>  /  ALT  109<H>  /  AlkPhos  261<H>  04-27    PT/INR - ( 27 Apr 2018 05:27 )   PT: 18.6 sec;   INR: 1.69 ratio         PTT - ( 27 Apr 2018 05:27 )  PTT:67.3 sec      RADIOLOGY & ADDITIONAL TESTS:

## 2018-04-28 LAB
ALBUMIN SERPL ELPH-MCNC: 2.4 G/DL — LOW (ref 3.3–5)
ALP SERPL-CCNC: 239 U/L — HIGH (ref 40–120)
ALT FLD-CCNC: 123 U/L — HIGH (ref 12–78)
ANA PAT FLD IF-IMP: ABNORMAL
ANA TITR SER: ABNORMAL
ANION GAP SERPL CALC-SCNC: 11 MMOL/L — SIGNIFICANT CHANGE UP (ref 5–17)
APTT BLD: 69.4 SEC — HIGH (ref 27.5–37.4)
AST SERPL-CCNC: 371 U/L — HIGH (ref 15–37)
BILIRUB SERPL-MCNC: 3.9 MG/DL — HIGH (ref 0.2–1.2)
BUN SERPL-MCNC: 36 MG/DL — HIGH (ref 7–23)
CALCIUM SERPL-MCNC: 7.7 MG/DL — LOW (ref 8.5–10.1)
CHLORIDE SERPL-SCNC: 104 MMOL/L — SIGNIFICANT CHANGE UP (ref 96–108)
CO2 SERPL-SCNC: 24 MMOL/L — SIGNIFICANT CHANGE UP (ref 22–31)
CREAT ?TM UR-MCNC: 99 MG/DL — SIGNIFICANT CHANGE UP
CREAT SERPL-MCNC: 1.6 MG/DL — HIGH (ref 0.5–1.3)
CULTURE RESULTS: NO GROWTH — SIGNIFICANT CHANGE UP
DIGOXIN SERPL-MCNC: 1.5 NG/ML — SIGNIFICANT CHANGE UP (ref 0.8–2)
EOSINOPHIL NFR BLD AUTO: 1 % — SIGNIFICANT CHANGE UP (ref 0–6)
GLUCOSE SERPL-MCNC: 78 MG/DL — SIGNIFICANT CHANGE UP (ref 70–99)
HCT VFR BLD CALC: 43.7 % — SIGNIFICANT CHANGE UP (ref 39–50)
HGB BLD-MCNC: 14.3 G/DL — SIGNIFICANT CHANGE UP (ref 13–17)
INR BLD: 1.56 RATIO — HIGH (ref 0.88–1.16)
LYMPHOCYTES # BLD AUTO: 10 % — LOW (ref 13–44)
MAGNESIUM SERPL-MCNC: 2.2 MG/DL — SIGNIFICANT CHANGE UP (ref 1.6–2.6)
MCHC RBC-ENTMCNC: 29.2 PG — SIGNIFICANT CHANGE UP (ref 27–34)
MCHC RBC-ENTMCNC: 32.8 GM/DL — SIGNIFICANT CHANGE UP (ref 32–36)
MCV RBC AUTO: 89.1 FL — SIGNIFICANT CHANGE UP (ref 80–100)
MONOCYTES NFR BLD AUTO: 5 % — SIGNIFICANT CHANGE UP (ref 1–9)
NEUTROPHILS NFR BLD AUTO: 73 % — SIGNIFICANT CHANGE UP (ref 43–77)
OSMOLALITY UR: 472 MOS/KG — SIGNIFICANT CHANGE UP (ref 50–1200)
PHOSPHATE SERPL-MCNC: 3.4 MG/DL — SIGNIFICANT CHANGE UP (ref 2.5–4.5)
PLATELET # BLD AUTO: 91 K/UL — LOW (ref 150–400)
POTASSIUM SERPL-MCNC: 4.1 MMOL/L — SIGNIFICANT CHANGE UP (ref 3.5–5.3)
POTASSIUM SERPL-SCNC: 4.1 MMOL/L — SIGNIFICANT CHANGE UP (ref 3.5–5.3)
PROT SERPL-MCNC: 5.3 G/DL — LOW (ref 6–8.3)
PROTHROM AB SERPL-ACNC: 17.2 SEC — HIGH (ref 9.8–12.7)
RBC # BLD: 4.9 M/UL — SIGNIFICANT CHANGE UP (ref 4.2–5.8)
RBC # FLD: 15.5 % — HIGH (ref 10.3–14.5)
SODIUM SERPL-SCNC: 139 MMOL/L — SIGNIFICANT CHANGE UP (ref 135–145)
SODIUM UR-SCNC: <20 MMOL/L — SIGNIFICANT CHANGE UP
SPECIMEN SOURCE: SIGNIFICANT CHANGE UP
WBC # BLD: 20.1 K/UL — HIGH (ref 3.8–10.5)
WBC # FLD AUTO: 20.1 K/UL — HIGH (ref 3.8–10.5)

## 2018-04-28 PROCEDURE — 99291 CRITICAL CARE FIRST HOUR: CPT

## 2018-04-28 RX ORDER — ALBUMIN HUMAN 25 %
50 VIAL (ML) INTRAVENOUS ONCE
Qty: 0 | Refills: 0 | Status: COMPLETED | OUTPATIENT
Start: 2018-04-28 | End: 2018-04-28

## 2018-04-28 RX ORDER — METOPROLOL TARTRATE 50 MG
2.5 TABLET ORAL ONCE
Qty: 0 | Refills: 0 | Status: COMPLETED | OUTPATIENT
Start: 2018-04-28 | End: 2018-04-28

## 2018-04-28 RX ADMIN — PIPERACILLIN AND TAZOBACTAM 25 GRAM(S): 4; .5 INJECTION, POWDER, LYOPHILIZED, FOR SOLUTION INTRAVENOUS at 05:43

## 2018-04-28 RX ADMIN — PANTOPRAZOLE SODIUM 40 MILLIGRAM(S): 20 TABLET, DELAYED RELEASE ORAL at 06:35

## 2018-04-28 RX ADMIN — PIPERACILLIN AND TAZOBACTAM 25 GRAM(S): 4; .5 INJECTION, POWDER, LYOPHILIZED, FOR SOLUTION INTRAVENOUS at 21:26

## 2018-04-28 RX ADMIN — OXYCODONE AND ACETAMINOPHEN 1 TABLET(S): 5; 325 TABLET ORAL at 21:26

## 2018-04-28 RX ADMIN — Medication 0.12 MILLIGRAM(S): at 13:18

## 2018-04-28 RX ADMIN — Medication 50 MILLILITER(S): at 13:18

## 2018-04-28 RX ADMIN — ENOXAPARIN SODIUM 70 MILLIGRAM(S): 100 INJECTION SUBCUTANEOUS at 18:15

## 2018-04-28 RX ADMIN — PIPERACILLIN AND TAZOBACTAM 25 GRAM(S): 4; .5 INJECTION, POWDER, LYOPHILIZED, FOR SOLUTION INTRAVENOUS at 13:19

## 2018-04-28 RX ADMIN — Medication 2.5 MILLIGRAM(S): at 18:15

## 2018-04-28 RX ADMIN — ENOXAPARIN SODIUM 70 MILLIGRAM(S): 100 INJECTION SUBCUTANEOUS at 05:42

## 2018-04-28 RX ADMIN — Medication 2.5 MILLIGRAM(S): at 04:31

## 2018-04-28 RX ADMIN — MIDODRINE HYDROCHLORIDE 15 MILLIGRAM(S): 2.5 TABLET ORAL at 13:17

## 2018-04-28 RX ADMIN — MIDODRINE HYDROCHLORIDE 15 MILLIGRAM(S): 2.5 TABLET ORAL at 05:42

## 2018-04-28 RX ADMIN — Medication 2.5 MILLIGRAM(S): at 05:42

## 2018-04-28 RX ADMIN — MIDODRINE HYDROCHLORIDE 15 MILLIGRAM(S): 2.5 TABLET ORAL at 21:26

## 2018-04-28 RX ADMIN — OXYCODONE AND ACETAMINOPHEN 1 TABLET(S): 5; 325 TABLET ORAL at 22:30

## 2018-04-28 NOTE — PROGRESS NOTE ADULT - PROBLEM SELECTOR PLAN 1
Now with tender RUQ, leukocytosis with bandemia, abn LFTs, mass, concern for infectious due to malignancy   considering the rapid drop in wbc this would be consistent with acute infectious process  -with patient deciding to leave and aware of potential risks would recommend in this scenario having patient take Levaquin 500mg PO Q-day upon discharge until May 9th and will recommend patient see me in the office next week.

## 2018-04-28 NOTE — PROGRESS NOTE ADULT - SUBJECTIVE AND OBJECTIVE BOX
infectious diseases progress note:    DIONE PALMER is a 72y y. o. Male patient    Patient reports: ready to go home understanding this my be a decision that results in deth    ROS:    EYES:  Negative  blurry vision or double vision  GASTROINTESTINAL:  Negative for nausea, vomiting, diarrhea  -otherwise negative except for subjective    Allergies    No Known Allergies    Intolerances        ANTIBIOTICS/RELEVANT:  antimicrobials  piperacillin/tazobactam IVPB. 3.375 Gram(s) IV Intermittent every 8 hours    immunologic:    OTHER:  albumin human 25% IVPB 50 milliLiter(s) IV Intermittent once PRN  ALBUTerol/ipratropium for Nebulization 3 milliLiter(s) Nebulizer every 8 hours  digoxin     Tablet 0.125 milliGRAM(s) Oral daily  dronabinol 2.5 milliGRAM(s) Oral two times a day  enoxaparin Injectable 70 milliGRAM(s) SubCutaneous every 12 hours  lidocaine   Patch 2 Patch Transdermal daily  midodrine 15 milliGRAM(s) Oral every 8 hours  oxyCODONE    5 mG/acetaminophen 325 mG 1 Tablet(s) Oral every 4 hours PRN  pantoprazole    Tablet 40 milliGRAM(s) Oral before breakfast  senna 2 Tablet(s) Oral at bedtime      Objective:  Vital Signs Last 24 Hrs  T(C): 36.6 (28 Apr 2018 07:29), Max: 36.9 (28 Apr 2018 05:07)  T(F): 97.8 (28 Apr 2018 07:29), Max: 98.5 (28 Apr 2018 05:07)  HR: 140 (28 Apr 2018 07:00) (99 - 140)  BP: 94/68 (28 Apr 2018 07:00) (85/61 - 118/70)  BP(mean): 77 (28 Apr 2018 07:00) (67 - 95)  RR: 25 (28 Apr 2018 07:00) (19 - 34)  SpO2: 95% (28 Apr 2018 07:00) (86% - 100%)    T(C): 36.6 (04-28-18 @ 07:29), Max: 38.6 (04-26-18 @ 12:00)  T(C): 36.6 (04-28-18 @ 07:29), Max: 40.2 (04-26-18 @ 10:36)  T(C): 36.6 (04-28-18 @ 07:29), Max: 40.2 (04-26-18 @ 10:36)    PHYSICAL EXAM:  Constitutional: Well-developed, well nourished  Eyes: PERRLA, EOMI  Ear/Nose/Throat: oropharynx normal	  Neck: no JVD, no lymphadenopathy, supple  Respiratory: no accessory muscle use  Cardiovascular: RRR,   Gastrointestinal: soft, NT  Extremities: no clubbing, no cyanosis, edema absent      LABS:                        14.3   20.1  )-----------( 91       ( 28 Apr 2018 06:03 )             43.7     Band Neutrophils %: 11.0 % (04.28.18 @ 06:03)    Band Neutrophils %: 10.0 % (04.27.18 @ 08:14)        20.1 04-28 @ 06:03  41.1 04-27 @ 08:14  43.0 04-27 @ 05:27  6.9 04-26 @ 09:57  11.2 04-26 @ 06:37  11.3 04-25 @ 06:04  13.7 04-23 @ 14:30      04-28    139  |  104  |  36<H>  ----------------------------<  78  4.1   |  24  |  1.60<H>    Ca    7.7<L>      28 Apr 2018 06:03  Phos  3.4     04-28  Mg     2.2     04-28    TPro  5.3<L>  /  Alb  2.4<L>  /  TBili  3.9<H>  /  DBili  x   /  AST  371<H>  /  ALT  123<H>  /  AlkPhos  239<H>  04-28      Creatinine, Serum: 1.60 mg/dL (04-28-18 @ 06:03)  Creatinine, Serum: 1.90 mg/dL (04-27-18 @ 05:27)  Creatinine, Serum: 1.50 mg/dL (04-26-18 @ 09:57)  Creatinine, Serum: 1.30 mg/dL (04-26-18 @ 06:37)  Creatinine, Serum: 1.20 mg/dL (04-25-18 @ 06:04)  Creatinine, Serum: 1.30 mg/dL (04-23-18 @ 14:30)      PT/INR - ( 28 Apr 2018 06:03 )   PT: 17.2 sec;   INR: 1.56 ratio         PTT - ( 28 Apr 2018 06:03 )  PTT:69.4 sec          MICROBIOLOGY:    Culture - Blood (04.26.18 @ 14:55)    Specimen Source: .Blood Blood-Peripheral    Culture Results:   No growth to date.        RADIOLOGY & ADDITIONAL STUDIES:

## 2018-04-28 NOTE — PROGRESS NOTE ADULT - ASSESSMENT
71 yo M with hx of HCV, cirrhosis, presented with abdominal pain, SOB and was found to have7 cm  right hepatic mass with extensive tumoral thrombosis, ascites, spiculated RUL mass with confluent right hilar and mediastinal lymphadenopathy.    Findings are concerning for  advanced malignancy.   s/p CT guided  paracentesis and liver Bx by Dr Dupree   cytology path was sent from fluid, which showed no malignant cells.   Pending pathology from liver Bx. CEA 10, AFP 51.     Had d/w Dr Machuca: possible need tissue from the lung (to be decided btw Dr Dodge and Dr Machuca method to obtain. But now pt rather go home, not go for doctors appointments.      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 Worsening renal failure.     RECOMMEND  Continue lovenox.   Palliative care eval.   Hospice referral, refused by pt.   Await path from liver biopsy. Discussed nn-diagnostic ascites fluid with pt, and aware of pending additional pathology specimen.   supportive measure per ICU  Emotional support, corbin with wife.   continue medical and cardiac evaluation

## 2018-04-28 NOTE — PROGRESS NOTE ADULT - SUBJECTIVE AND OBJECTIVE BOX
INTERVAL HPI/OVERNIGHT EVENTS:  pt seen and examined, remains in ICU, currently sitting up in chair. Offers no complaints  currently denies n/v/c, c/o mild ruq pain +gas      MEDICATIONS  (STANDING):  ALBUTerol/ipratropium for Nebulization 3 milliLiter(s) Nebulizer every 8 hours  enoxaparin Injectable 70 milliGRAM(s) SubCutaneous every 12 hours  midodrine 15 milliGRAM(s) Oral every 8 hours  norepinephrine Infusion 0.03 MICROgram(s)/kG/Min (4.084 mL/Hr) IV Continuous <Continuous>  pantoprazole    Tablet 40 milliGRAM(s) Oral before breakfast  piperacillin/tazobactam IVPB. 3.375 Gram(s) IV Intermittent every 8 hours  potassium chloride    Tablet ER 10 milliEquivalent(s) Oral daily  senna 2 Tablet(s) Oral at bedtime    MEDICATIONS  (PRN):  albumin human 25% IVPB 50 milliLiter(s) IV Intermittent once PRN see below  oxyCODONE    5 mG/acetaminophen 325 mG 1 Tablet(s) Oral every 4 hours PRN Moderate Pain (4 - 6)      Allergies    No Known Allergies    Intolerances        Review of Systems:    General:  No wt loss, fevers, chills, night sweats, fatigue   Eyes:  Good vision, no reported pain  ENT:  No sore throat, pain, runny nose, dysphagia  CV:  No pain, palpitations, hypo/hypertension  Resp:  No dyspnea, cough, tachypnea, wheezing  GI:  passing gas, mild abd pain, No nausea, No vomiting, No diarrhea, No constipation, No weight loss, No fever, No pruritis, No rectal bleeding, No melena, No dysphagia  :  No pain, bleeding, incontinence, nocturia  Muscle:  No pain, weakness  Neuro:  No weakness, tingling, memory problems  Psych:  No fatigue, insomnia, mood problems, depression  Endocrine:  No polyuria, polydypsia, cold/heat intolerance  Heme:  No petechiae, ecchymosis, easy bruisability  Skin:  No rash, tattoos, scars, edema      Vital Signs Last 24 Hrs  T(C): 36.6 (28 Apr 2018 07:29), Max: 36.9 (28 Apr 2018 05:07)  T(F): 97.8 (28 Apr 2018 07:29), Max: 98.5 (28 Apr 2018 05:07)  HR: 140 (28 Apr 2018 07:00) (95 - 140)  BP: 94/68 (28 Apr 2018 07:00) (85/61 - 118/70)  BP(mean): 77 (28 Apr 2018 07:00) (67 - 95)  RR: 25 (28 Apr 2018 07:00) (19 - 34)  SpO2: 95% (28 Apr 2018 07:00) (86% - 100%)  PHYSICAL EXAM:    Constitutional: NAD, oob in chair  HEENT: EOMI, +scleral icterus  Neck: No LAD, supple  Respiratory: dec bs  Cardiovascular: S1 and S2, irreg  Gastrointestinal: BS+, soft, ttp ruq+dt  Extremities: +mild le edema   Vascular: 2+ peripheral pulses  Neurological: A/O x 3  Skin: +jaundice    LABS:                                       14.3   20.1  )-----------( 91       ( 28 Apr 2018 06:03 )             43.7   04-28    139  |  104  |  36<H>  ----------------------------<  78  4.1   |  24  |  1.60<H>    Ca    7.7<L>      28 Apr 2018 06:03  Phos  3.4     04-28  Mg     2.2     04-28    TPro  5.3<L>  /  Alb  2.4<L>  /  TBili  3.9<H>  /  DBili  x   /  AST  371<H>  /  ALT  123<H>  /  AlkPhos  239<H>  04-28      RADIOLOGY & ADDITIONAL TESTS:

## 2018-04-28 NOTE — PROGRESS NOTE ADULT - PROBLEM SELECTOR PLAN 1
-AFP/CEA elevated  -trend hepatic panel, can add nely 200mg tid for elev bili  -s/p IR guided bx/paracentesis 4/25; para revealed clear fluid, micro neg, cx no growth to date, cyto neg  -f/u bx results  patient would not like any further work up   would like to be discharged with home hospice

## 2018-04-28 NOTE — PROGRESS NOTE ADULT - SUBJECTIVE AND OBJECTIVE BOX
24 hour events:   levophed weaned off yesterday afternoon  family meeting with pt, HCP sister, and wife, pt wants to go home with hospice care, does not want to pursue additional cancer workup and treatment    T(F): 97.8 (04-28-18 @ 07:29), Max: 98.5 (04-28-18 @ 05:07)  HR: 140 (04-28-18 @ 07:00) (95 - 140)  BP: 94/68 (04-28-18 @ 07:00) (85/61 - 118/70)  RR: 25 (04-28-18 @ 07:00) (19 - 34)  SpO2: 95% (04-28-18 @ 07:00) (86% - 100%)      POCT Blood Glucose.: 87 mg/dL (26 Apr 2018 09:18)      I&O's Summary    27 Apr 2018 07:01  -  28 Apr 2018 07:00  --------------------------------------------------------  IN: 794 mL / OUT: 680 mL / NET: 114 mL        Physical Exam:   Gen:  Neuro:  HEENT:  Resp:  CVS:  Abd:  Ext:  Skin:    Meds:  piperacillin/tazobactam IVPB. IV Intermittent    digoxin     Tablet Oral  midodrine Oral      ALBUTerol/ipratropium for Nebulization Nebulizer    dronabinol Oral  oxyCODONE    5 mG/acetaminophen 325 mG Oral PRN      enoxaparin Injectable SubCutaneous    pantoprazole    Tablet Oral  senna Oral      albumin human 25% IVPB IV Intermittent PRN      lidocaine   Patch Transdermal                              14.3   20.1  )-----------( 91       ( 28 Apr 2018 06:03 )             43.7     Bands 11.0  Bands 10.0    04-28    139  |  104  |  36<H>  ----------------------------<  78  4.1   |  24  |  1.60<H>    Ca    7.7<L>      28 Apr 2018 06:03  Phos  3.4     04-28  Mg     2.2     04-28    TPro  5.3<L>  /  Alb  2.4<L>  /  TBili  3.9<H>  /  DBili  x   /  AST  371<H>  /  ALT  123<H>  /  AlkPhos  239<H>  04-28      CARDIAC MARKERS ( 26 Apr 2018 09:57 )  .032 ng/mL / x     / 94 U/L / x     / 1.3 ng/mL      PT/INR - ( 28 Apr 2018 06:03 )   PT: 17.2 sec;   INR: 1.56 ratio         PTT - ( 28 Apr 2018 06:03 )  PTT:69.4 sec    .Blood Blood-Peripheral   No growth to date. -- 04-26 @ 14:55  Peritoneal Peritoneal Fluid   No growth to date   No polymorphonuclear cells seen per low power field  No organisms seen per oil power field  by cytocentrifuge 04-25 @ 16:51              Radiology: ***    Bedside ultrasound: ***    CENTRAL LINE: N/Y          DATE INSERTED:              REMOVE: Y/N  SMILEY: N/Y                       DATE INSERTED:              REMOVE: Y/N  A-LINE: N/Y                       DATE INSERTED:              REMOVE: Y/N    GLOBAL ISSUE/BEST PRACTICE:  Analgesia:  Sedation:  CAM-ICU:   HOB elevation: yes  Stress ulcer prophylaxis:  VTE prophylaxis:  Glycemic control:  Nutrition:    CODE STATUS: *** 24 hour events:   levophed weaned off yesterday afternoon  family meeting yesterday with pt, HCP sister, and wife, pt wants to go home with hospice care, does not want to pursue additional cancer workup and treatment  this am pt asking to go home  no LH/CP/dyspnea with ambulating to commode    T(F): 97.8 (04-28-18 @ 07:29), Max: 98.5 (04-28-18 @ 05:07)  HR: 140 (04-28-18 @ 07:00) (95 - 140)  BP: 94/68 (04-28-18 @ 07:00) (85/61 - 118/70)  RR: 25 (04-28-18 @ 07:00) (19 - 34)  SpO2: 95% (04-28-18 @ 07:00) (86% - 100%)      POCT Blood Glucose.: 87 mg/dL (26 Apr 2018 09:18)      I&O's Summary    27 Apr 2018 07:01  -  28 Apr 2018 07:00  --------------------------------------------------------  IN: 794 mL / OUT: 680 mL / NET: 114 mL        Physical Exam:   Gen:  Neuro:  HEENT:  Resp:  CVS:  Abd:  Ext:  Skin:    Meds:  piperacillin/tazobactam IVPB. IV Intermittent    digoxin     Tablet Oral  midodrine Oral      ALBUTerol/ipratropium for Nebulization Nebulizer    dronabinol Oral  oxyCODONE    5 mG/acetaminophen 325 mG Oral PRN      enoxaparin Injectable SubCutaneous    pantoprazole    Tablet Oral  senna Oral      albumin human 25% IVPB IV Intermittent PRN      lidocaine   Patch Transdermal                              14.3   20.1  )-----------( 91       ( 28 Apr 2018 06:03 )             43.7     Bands 11.0  Bands 10.0    04-28    139  |  104  |  36<H>  ----------------------------<  78  4.1   |  24  |  1.60<H>    Ca    7.7<L>      28 Apr 2018 06:03  Phos  3.4     04-28  Mg     2.2     04-28    TPro  5.3<L>  /  Alb  2.4<L>  /  TBili  3.9<H>  /  DBili  x   /  AST  371<H>  /  ALT  123<H>  /  AlkPhos  239<H>  04-28      CARDIAC MARKERS ( 26 Apr 2018 09:57 )  .032 ng/mL / x     / 94 U/L / x     / 1.3 ng/mL      PT/INR - ( 28 Apr 2018 06:03 )   PT: 17.2 sec;   INR: 1.56 ratio         PTT - ( 28 Apr 2018 06:03 )  PTT:69.4 sec    .Blood Blood-Peripheral   No growth to date. -- 04-26 @ 14:55  Peritoneal Peritoneal Fluid   No growth to date   No polymorphonuclear cells seen per low power field  No organisms seen per oil power field  by cytocentrifuge 04-25 @ 16:51              Radiology: ***    Bedside ultrasound: ***    CENTRAL LINE: N/Y          DATE INSERTED:              REMOVE: Y/N  SMILEY: N/Y                       DATE INSERTED:              REMOVE: Y/N  A-LINE: N/Y                       DATE INSERTED:              REMOVE: Y/N    GLOBAL ISSUE/BEST PRACTICE:  Analgesia:  Sedation:  CAM-ICU:   HOB elevation: yes  Stress ulcer prophylaxis:  VTE prophylaxis:  Glycemic control:  Nutrition:    CODE STATUS: *** 24 hour events:   levophed weaned off yesterday afternoon  family meeting yesterday with pt, HCP sister, and wife, pt wants to go home with hospice care, does not want to pursue additional cancer workup and treatment  this am pt asking to go home  no LH/CP/dyspnea with ambulating to commode    T(F): 97.8 (04-28-18 @ 07:29), Max: 98.5 (04-28-18 @ 05:07)  HR: 140 (04-28-18 @ 07:00) (95 - 140)  BP: 94/68 (04-28-18 @ 07:00) (85/61 - 118/70)  RR: 25 (04-28-18 @ 07:00) (19 - 34)  SpO2: 95% (04-28-18 @ 07:00) (86% - 100%)      POCT Blood Glucose.: 87 mg/dL (26 Apr 2018 09:18)      I&O's Summary    27 Apr 2018 07:01  -  28 Apr 2018 07:00  --------------------------------------------------------  IN: 794 mL / OUT: 680 mL / NET: 114 mL        Physical Exam:   Gen: sitting in chair, NAD  Neuro: A and O x 3  Resp: CTABL  CVS: irreg irreg, tachy  Abd: soft, NTND    Meds:  piperacillin/tazobactam IVPB. IV Intermittent    digoxin     Tablet Oral  midodrine Oral      ALBUTerol/ipratropium for Nebulization Nebulizer    dronabinol Oral  oxyCODONE    5 mG/acetaminophen 325 mG Oral PRN      enoxaparin Injectable SubCutaneous    pantoprazole    Tablet Oral  senna Oral      albumin human 25% IVPB IV Intermittent PRN      lidocaine   Patch Transdermal                              14.3   20.1  )-----------( 91       ( 28 Apr 2018 06:03 )             43.7     Bands 11.0  Bands 10.0    04-28    139  |  104  |  36<H>  ----------------------------<  78  4.1   |  24  |  1.60<H>    Ca    7.7<L>      28 Apr 2018 06:03  Phos  3.4     04-28  Mg     2.2     04-28    TPro  5.3<L>  /  Alb  2.4<L>  /  TBili  3.9<H>  /  DBili  x   /  AST  371<H>  /  ALT  123<H>  /  AlkPhos  239<H>  04-28      CARDIAC MARKERS ( 26 Apr 2018 09:57 )  .032 ng/mL / x     / 94 U/L / x     / 1.3 ng/mL      PT/INR - ( 28 Apr 2018 06:03 )   PT: 17.2 sec;   INR: 1.56 ratio         PTT - ( 28 Apr 2018 06:03 )  PTT:69.4 sec    .Blood Blood-Peripheral   No growth to date. -- 04-26 @ 14:55    Peritoneal Peritoneal Fluid   No growth to date   No polymorphonuclear cells seen per low power field  No organisms seen per oil power field  by cytocentrifuge 04-25 @ 16:51      CENTRAL LINE: N  SMILEY: N  A-LINE: N    GLOBAL ISSUE/BEST PRACTICE:  Analgesia: Y  Sedation: N  CAM-ICU: NEG  HOB elevation: yes  Stress ulcer prophylaxis: Y  VTE prophylaxis: Y, AC  Glycemic control: Y  Nutrition: Y    CODE STATUS: FULL

## 2018-04-28 NOTE — PROGRESS NOTE ADULT - SUBJECTIVE AND OBJECTIVE BOX
VITALS/LABS  4/28/2018 991 120 100/70   4/28/2018 W 20.1 Hb 14.3 Plt 91 Na 139 K 4.1 CO2 24 Cr 1.6     REVIEW OF SYMPTOMS    Able to give ROS  Yes     RELIABLE No   CONSTITUTIONAL Weakness Yes  Chills No Vision changes No  ENDOCRINE No unexplained hair loss No heat or cold intolerance    ALLERGY No hives  Sore throat No   RESP Coughing blood no  Shortness of breath YES   NEURO No Headache  Confusion Pain neck No   CARDIAC No Chest pain No Palpitations   GI No Pain abdomen NO   Vomiting NO     PHYSICAL EXAM    HEENT Unremarkable PERRLA atraumatic   RESP Fair air entry EXP prolonged    Harsh breath sound Resp distres mild   CARDIAC S1 S2 No S3     NO JVD    ABDOMEN SOFT BS PRESENT NOT DISTENDED No hepatosplenomegaly PEDAL EDEMA present No calf tenderness  NO rash   GENERAL Not TOXIC looking    GLOBAL ISSUE/BEST PRACTICE:        PROBLEM: Analgesia:     na                        PROBLEM: Sedation:     na               PROBLEM: HOB elevation:   y            PROBLEM: Stress ulcer proph:    na                      PROBLEM: VTE prophylaxis:      Lvnx 70.2 (4/23)   PROBLEM: Glycemic control:    na  PROBLEM: Nutrition:    reg diet (4/23)   PROBLEM: Advanced directive: na     PROBLEM: Allergies:  na      PROBLEM BASES ASSESSMENT PLAN 4/23/2018 ADMISSION [facility]   NOTE Pt tr to ICU 4/26  NOTE In accordance with  [facility] Michelle/Syo/[facility] VS policy ICU final medical management decisions/MD orders are  determined/done only by ICU Intensivists    72 m with hilomediastinal parenchymal lung and liver masses with ascites admitted 4/23 with dyspnea  Likely has metaastatic hcc Prognosis grave   Pt had CTNB liver and paracentesis 4/25  CTNB lung mass  option explored 4/26 with Dr Dodge is not safely accessible  Pt has AFRVR and seems too unatabe for bronch at this point (dw DR Mclean on 4/26 am    DYSPNEA   4/26/2018 100% 743/25/260   4/24 RA 96%   4/23 Check echo ro chf check v duplex legs ro dvt   4/23 O2 Monitor po  4/24 V duplex legs n   COPD  Duoneb.3 (4/23)   SEPSIS   Zosyn (4/26)     AF RVR   4/24 Cardio plans maximizing bb and if hemodynamic instability then DCCV   4/24 Pt on ac for pvt   RO MI   4/23 Tr 1 n.2   Metoprolol 50.4 (4/25)   CHF  4/24/2018 ECHO ef 50% Mod mr rvsp 20 large bl effsns   Lasix 40 (4/23)   HILOMEDIASTINAL PARENCHYMAL LUNG MASS AND LIVER MASS WITH ASCITES  4/25 Recd call from [facility] that they need lung bx to determine wheher pt has second primary Willdw Dr Dodge Bronch would  be dewayne risky in setting of cardiac problems   4/25 Pt had liver bx as well as paracentesis Results pending   4/23 cea 10.6   4/23 afp 51.5   4/23 Suggest paracentesis or liver biopsy before pulmonary invasive testing  4/24 case dw Dr Dodge Plan is for IR to do nb of liver and paracentesis     PORTAL VEIN THROMBOSIS   Lvnx 70.2 (4/23)   HEP C   4/23 Hep C ab pos     DISPO 4/28/2018 Pt is now off cardizem drip as well as NE which he was on earlier He was weaned to midodrine 15.3 (4/26) He had been started on zosyn for poss sepsis as he had developed W 43 on 4/27case dw Dr Carrion and Dr Mclean Patient has decided to progressively decline and have withdrawn diagnostic and therapeutic interventions regardless of adverse effect He wanted to be DCed today but agreed to stay after cardio spke to him Prognosis is poor    TIME SPENT Over 25 minutes aggregate care time spent on encounter; activities included   direct patient care, counseling and/or coordinating care reviewing notes, lab data/ imaging , discussion with multidisciplinary team/ patient  /family. Risks, benefits, alternatives  discussed in detail. Proper antibiotic use issues addressed Questions/concerns  were addressed  as  best as possible As applicable to this patient the following issues were addressed Pain was  assessed and addresed.Pt was screened for signs of clinical depression .Appropriate referral made.Risk of falls assessed.Fall prevention d/w family .Pt was screened for tobacco and etoh,counseling was done for etoh and tobacco cessation as applicable .Use of narcotic pain meds was discussed as applicable including  to use narcotic meds  wisely and to avoid overusing them

## 2018-04-28 NOTE — PROGRESS NOTE ADULT - SUBJECTIVE AND OBJECTIVE BOX
[facility] Cardiology    CHIEF COMPLAINT: Patient is a 72y old  Male who presents with a chief complaint of sob right upper quadrant pain for 2 weeks (23 Apr 2018 21:19)      Follow Up: [ ] Chest Pain      [ ] Dyspnea     [ ] Palpitations    [ ] Atrial Fibrillation     [ ] Ventricular Dysrhythmia    [ ] Abnormal EKG                      [ ] Abnormal Cardiac Enzymes     [ ] Valvular Disease    HPI:  patient came to the office for ruq pain  sob for 1 week  saw dr morse friday,lft mildly elevated,was told to be seen  patient has refused all preventative w/u in the past,except labs and bp medications  refused cxr/colonoscopy  when he came in today,i knew there was a problem,,he usually will come only once a year  he had ruq pain,hepatomegally,leg edema 2+  i sent him to the er,,i explained it may be cancer primarily or simpler like acute choly/pancreatitis  girl friend was present (23 Apr 2018 21:19)    Pt now in ICU   s/p norepinephrine Infusion 0.03 MICROgram(s)/kG/Min (4.084 mL/Hr) IV Continuous <Continuous> yesterday, now off  Beta Blocker held sec to hypotension  Pt tachycardic -120 bpm, AF    PAST MEDICAL & SURGICAL HISTORY:  Hip arthritis: lt  Hypertension  No significant past surgical history      MEDICATIONS  (STANDING):  albumin human 25% IVPB 50 milliLiter(s) IV Intermittent once  ALBUTerol/ipratropium for Nebulization 3 milliLiter(s) Nebulizer every 8 hours  digoxin     Tablet 0.125 milliGRAM(s) Oral daily  dronabinol 2.5 milliGRAM(s) Oral two times a day  enoxaparin Injectable 70 milliGRAM(s) SubCutaneous every 12 hours  lidocaine   Patch 2 Patch Transdermal daily  midodrine 15 milliGRAM(s) Oral every 8 hours  pantoprazole    Tablet 40 milliGRAM(s) Oral before breakfast  piperacillin/tazobactam IVPB. 3.375 Gram(s) IV Intermittent every 8 hours  senna 2 Tablet(s) Oral at bedtime    MEDICATIONS  (PRN):  oxyCODONE    5 mG/acetaminophen 325 mG 1 Tablet(s) Oral every 4 hours PRN Moderate Pain (4 - 6)      Allergies    No Known Allergies    Intolerances        REVIEW OF SYSTEMS:    CONSTITUTIONAL: No weakness, fevers or chills.   EYES/ENT: No visual changes;  No vertigo or throat pain   NECK: No pain or stiffness  RESPIRATORY: No cough, wheezing, hemoptysis; No shortness of breath  CARDIOVASCULAR: No chest pain or palpitations  GASTROINTESTINAL: No abdominal or epigastric pain. No nausea, vomiting, or hematemesis; No diarrhea or constipation. No melena or hematochezia.  GENITOURINARY: No dysuria, frequency or hematuria  NEUROLOGICAL: No numbness or weakness  SKIN: No itching, burning, rashes, or lesions   All other review of systems is negative unless indicated above    Vital Signs Last 24 Hrs  T(C): 36.4 (28 Apr 2018 12:00), Max: 36.9 (28 Apr 2018 05:07)  T(F): 97.5 (28 Apr 2018 12:00), Max: 98.5 (28 Apr 2018 05:07)  HR: 122 (28 Apr 2018 10:00) (99 - 140)  BP: 79/55 (28 Apr 2018 10:00) (79/55 - 118/70)  BP(mean): 62 (28 Apr 2018 10:00) (62 - 89)  RR: 25 (28 Apr 2018 10:00) (19 - 34)  SpO2: 93% (28 Apr 2018 10:00) (85% - 99%)    I&O's Summary    27 Apr 2018 07:01  -  28 Apr 2018 07:00  --------------------------------------------------------  IN: 794 mL / OUT: 680 mL / NET: 114 mL      PHYSICAL EXAM:  Constitutional: acute distress, awake, alert  Neurological: Alert,   HEENT: no JVD, EOMI  Cardiovascular: irr, S1 and S2, tachy  Pulmonary: breath sounds bilaterally rhonchi  Gastrointestinal: Bowel Sounds present, soft, nontender  EXT:  peripheral edema  Skin: No rashes.  Psych:  Mood & affect appropriate      LABS: All Labs Reviewed:                        14.3   20.1  )-----------( 91       ( 28 Apr 2018 06:03 )             43.7     04-28    139  |  104  |  36<H>  ----------------------------<  78  4.1   |  24  |  1.60<H>    Ca    7.7<L>      28 Apr 2018 06:03  Phos  3.4     04-28  Mg     2.2     04-28    TPro  5.3<L>  /  Alb  2.4<L>  /  TBili  3.9<H>  /  DBili  x   /  AST  371<H>  /  ALT  123<H>  /  AlkPhos  239<H>  04-28    PT/INR - ( 28 Apr 2018 06:03 )   PT: 17.2 sec;   INR: 1.56 ratio         PTT - ( 28 Apr 2018 06:03 )  PTT:69.4 sec      Blood Culture: Organism --  Gram Stain Blood -- Gram Stain --  Specimen Source .Blood Blood-Peripheral  Culture-Blood --    Organism --  Gram Stain Blood -- Gram Stain   No polymorphonuclear cells seen per low power field  No organisms seen per oil power field  by cytocentrifuge  Specimen Source Peritoneal Peritoneal Fluid  Culture-Blood --  · Assessment		  72M getting work up for severe left Hip OA and replacement found with elevated liver chemistries, Right lower costal pain, with sono noted for liver mass, portal vein thrombosis. CT in hospital shows lung masses in addition to liver mass and ascites.  Afib with better rapid rate, relatively new onset (was in NSR 3/13/2018), chronic edema Acute on chronic diastolic HF (HFpEF), diffuse ST and T abnormalities on EKG, could be ischemic changes. These were noted before.    Pt now in ICU   s/p  norepinephrine Infusion 0.03 MICROgram(s)/kG/Min (4.084 mL/Hr) IV Continuous <Continuous> yesterday, now off  BetaB held sec to ongoing hypotension  Pt tachycardic HR -120s-130 bpm, AF  s/p IV digoxin, on floor.  Rec: DIG 0.125 mg PO qday  No Amio sec to cirrhosis  Midodrine being added for BP support  Lovenox 70 mg q12    Troponin negative for MI thus    Echo with rapid heart rates and possible anterior wall hypokinesis with EF 50-55%.  continue asa 81     Edema Ascites Pleural effusion/ CHF?/cirrhosis  - chronic edema despite lasix.  Hold lasix now sec to lowish BP.    Awaiting work up of liver mass   Lung bx on hold  Pt considering comfort care    Overall guarded prognosis.

## 2018-04-28 NOTE — PROGRESS NOTE ADULT - SUBJECTIVE AND OBJECTIVE BOX
INTERVAL HX:   pt sitting in chair. Reading [employer]. Had converations with ICU team this AM, does not want hospice referral yet as he feels his wife is not ready.   Pt AOx3, knows year, month and date was either 27, 28 or 29th, Trump president, and hospitalized. States he will heed advice to stay for HR control.   Chart reviewed and events noted;     MEDICATIONS  (STANDING):  ALBUTerol/ipratropium for Nebulization 3 milliLiter(s) Nebulizer every 8 hours  digoxin     Tablet 0.125 milliGRAM(s) Oral daily  dronabinol 2.5 milliGRAM(s) Oral two times a day  enoxaparin Injectable 70 milliGRAM(s) SubCutaneous every 12 hours  lidocaine   Patch 2 Patch Transdermal daily  midodrine 15 milliGRAM(s) Oral every 8 hours  pantoprazole    Tablet 40 milliGRAM(s) Oral before breakfast  piperacillin/tazobactam IVPB. 3.375 Gram(s) IV Intermittent every 8 hours  senna 2 Tablet(s) Oral at bedtime    MEDICATIONS  (PRN):  oxyCODONE    5 mG/acetaminophen 325 mG 1 Tablet(s) Oral every 4 hours PRN Moderate Pain (4 - 6)      Vital Signs Last 24 Hrs  T(C): 36.4 (28 Apr 2018 12:00), Max: 36.9 (28 Apr 2018 05:07)  T(F): 97.5 (28 Apr 2018 12:00), Max: 98.5 (28 Apr 2018 05:07)  HR: 122 (28 Apr 2018 10:00) (106 - 140)  BP: 79/55 (28 Apr 2018 10:00) (79/55 - 102/69)  BP(mean): 62 (28 Apr 2018 10:00) (62 - 81)  RR: 25 (28 Apr 2018 10:00) (19 - 28)  SpO2: 93% (28 Apr 2018 10:00) (85% - 99%)    PHYSICAL EXAM  General: WN WD adult in NAD  HEENT: clear oropharynx, icteric sclera, pink conjunctivae  Neck: supple  CV: normal S1S2, RRR, no murmur, no rubs, no gallops  Lungs: clear to auscultation BL, no wheezes, no rales, no rhonchi  Abdomen: soft, non-tender, non-distended, no hepatosplenomegaly  Ext: no clubbing, no cyanosis, no edema  Skin: no rashes, no petichiae  Neuro: alert and oriented X3, no focal deficits      LABS:                        14.3   20.1  )-----------( 91       ( 28 Apr 2018 06:03 )             43.7     04-28    139  |  104  |  36<H>  ----------------------------<  78  4.1   |  24  |  1.60<H>    Ca    7.7<L>      28 Apr 2018 06:03  Phos  3.4     04-28  Mg     2.2     04-28    TPro  5.3<L>  /  Alb  2.4<L>  /  TBili  3.9<H>  /  DBili  x   /  AST  371<H>  /  ALT  123<H>  /  AlkPhos  239<H>  04-28    PT/INR - ( 28 Apr 2018 06:03 )   PT: 17.2 sec;   INR: 1.56 ratio         PTT - ( 28 Apr 2018 06:03 )  PTT:69.4 sec      RADIOLOGY & ADDITIONAL STUDIES:

## 2018-04-28 NOTE — PROGRESS NOTE ADULT - PROBLEM SELECTOR PLAN 2
-? secondary to ETOH vs HCV  -HCV ab reactive, HCV RNA +, viral load [id number],  f/u genotype  -hcv rna repeated and neg?? recommend to repeat labs to confirm hcv status  -f/u autoimmune labs- pending/in progress  -ETOH abstinence

## 2018-04-29 VITALS
HEART RATE: 103 BPM | DIASTOLIC BLOOD PRESSURE: 73 MMHG | OXYGEN SATURATION: 74 % | RESPIRATION RATE: 27 BRPM | SYSTOLIC BLOOD PRESSURE: 115 MMHG

## 2018-04-29 LAB
ANION GAP SERPL CALC-SCNC: 13 MMOL/L — SIGNIFICANT CHANGE UP (ref 5–17)
BASOPHILS # BLD AUTO: 0.1 K/UL — SIGNIFICANT CHANGE UP (ref 0–0.2)
BASOPHILS NFR BLD AUTO: 0.4 % — SIGNIFICANT CHANGE UP (ref 0–2)
BUN SERPL-MCNC: 31 MG/DL — HIGH (ref 7–23)
CALCIUM SERPL-MCNC: 7.9 MG/DL — LOW (ref 8.5–10.1)
CHLORIDE SERPL-SCNC: 104 MMOL/L — SIGNIFICANT CHANGE UP (ref 96–108)
CO2 SERPL-SCNC: 24 MMOL/L — SIGNIFICANT CHANGE UP (ref 22–31)
CREAT SERPL-MCNC: 1.3 MG/DL — SIGNIFICANT CHANGE UP (ref 0.5–1.3)
EOSINOPHIL # BLD AUTO: 0.1 K/UL — SIGNIFICANT CHANGE UP (ref 0–0.5)
EOSINOPHIL NFR BLD AUTO: 0.7 % — SIGNIFICANT CHANGE UP (ref 0–6)
GLUCOSE SERPL-MCNC: 88 MG/DL — SIGNIFICANT CHANGE UP (ref 70–99)
HCT VFR BLD CALC: 37 % — LOW (ref 39–50)
HGB BLD-MCNC: 12.1 G/DL — LOW (ref 13–17)
LYMPHOCYTES # BLD AUTO: 1.1 K/UL — SIGNIFICANT CHANGE UP (ref 1–3.3)
LYMPHOCYTES # BLD AUTO: 7.9 % — LOW (ref 13–44)
MAGNESIUM SERPL-MCNC: 2.3 MG/DL — SIGNIFICANT CHANGE UP (ref 1.6–2.6)
MCHC RBC-ENTMCNC: 29.3 PG — SIGNIFICANT CHANGE UP (ref 27–34)
MCHC RBC-ENTMCNC: 32.8 GM/DL — SIGNIFICANT CHANGE UP (ref 32–36)
MCV RBC AUTO: 89.3 FL — SIGNIFICANT CHANGE UP (ref 80–100)
MONOCYTES # BLD AUTO: 0.8 K/UL — SIGNIFICANT CHANGE UP (ref 0–0.9)
MONOCYTES NFR BLD AUTO: 5.8 % — SIGNIFICANT CHANGE UP (ref 1–9)
NEUTROPHILS # BLD AUTO: 12.3 K/UL — HIGH (ref 1.8–7.4)
NEUTROPHILS NFR BLD AUTO: 85.2 % — HIGH (ref 43–77)
PHOSPHATE SERPL-MCNC: 1.9 MG/DL — LOW (ref 2.5–4.5)
PLATELET # BLD AUTO: 56 K/UL — LOW (ref 150–400)
POTASSIUM SERPL-MCNC: 3.4 MMOL/L — LOW (ref 3.5–5.3)
POTASSIUM SERPL-SCNC: 3.4 MMOL/L — LOW (ref 3.5–5.3)
RBC # BLD: 4.14 M/UL — LOW (ref 4.2–5.8)
RBC # FLD: 14.9 % — HIGH (ref 10.3–14.5)
SODIUM SERPL-SCNC: 141 MMOL/L — SIGNIFICANT CHANGE UP (ref 135–145)
WBC # BLD: 14.4 K/UL — HIGH (ref 3.8–10.5)
WBC # FLD AUTO: 14.4 K/UL — HIGH (ref 3.8–10.5)

## 2018-04-29 PROCEDURE — 87040 BLOOD CULTURE FOR BACTERIA: CPT

## 2018-04-29 PROCEDURE — 84100 ASSAY OF PHOSPHORUS: CPT

## 2018-04-29 PROCEDURE — 36600 WITHDRAWAL OF ARTERIAL BLOOD: CPT

## 2018-04-29 PROCEDURE — 86364 TISS TRNSGLTMNASE EA IG CLAS: CPT

## 2018-04-29 PROCEDURE — 83880 ASSAY OF NATRIURETIC PEPTIDE: CPT

## 2018-04-29 PROCEDURE — 99238 HOSP IP/OBS DSCHRG MGMT 30/<: CPT

## 2018-04-29 PROCEDURE — 82150 ASSAY OF AMYLASE: CPT

## 2018-04-29 PROCEDURE — 83735 ASSAY OF MAGNESIUM: CPT

## 2018-04-29 PROCEDURE — 71275 CT ANGIOGRAPHY CHEST: CPT

## 2018-04-29 PROCEDURE — 96374 THER/PROPH/DIAG INJ IV PUSH: CPT | Mod: 59

## 2018-04-29 PROCEDURE — 82390 ASSAY OF CERULOPLASMIN: CPT

## 2018-04-29 PROCEDURE — 82553 CREATINE MB FRACTION: CPT

## 2018-04-29 PROCEDURE — 80162 ASSAY OF DIGOXIN TOTAL: CPT

## 2018-04-29 PROCEDURE — 86431 RHEUMATOID FACTOR QUANT: CPT

## 2018-04-29 PROCEDURE — 81001 URINALYSIS AUTO W/SCOPE: CPT

## 2018-04-29 PROCEDURE — 88307 TISSUE EXAM BY PATHOLOGIST: CPT

## 2018-04-29 PROCEDURE — 87070 CULTURE OTHR SPECIMN AEROBIC: CPT

## 2018-04-29 PROCEDURE — 87902 NFCT AGT GNTYP ALYS HEP C: CPT

## 2018-04-29 PROCEDURE — 88312 SPECIAL STAINS GROUP 1: CPT

## 2018-04-29 PROCEDURE — 86036 ANCA SCREEN EACH ANTIBODY: CPT

## 2018-04-29 PROCEDURE — 74177 CT ABD & PELVIS W/CONTRAST: CPT

## 2018-04-29 PROCEDURE — 80074 ACUTE HEPATITIS PANEL: CPT

## 2018-04-29 PROCEDURE — 36415 COLL VENOUS BLD VENIPUNCTURE: CPT

## 2018-04-29 PROCEDURE — 80053 COMPREHEN METABOLIC PANEL: CPT

## 2018-04-29 PROCEDURE — 93306 TTE W/DOPPLER COMPLETE: CPT

## 2018-04-29 PROCEDURE — 49083 ABD PARACENTESIS W/IMAGING: CPT

## 2018-04-29 PROCEDURE — 82378 CARCINOEMBRYONIC ANTIGEN: CPT

## 2018-04-29 PROCEDURE — 84157 ASSAY OF PROTEIN OTHER: CPT

## 2018-04-29 PROCEDURE — 88108 CYTOPATH CONCENTRATE TECH: CPT

## 2018-04-29 PROCEDURE — 87521 HEPATITIS C PROBE&RVRS TRNSC: CPT

## 2018-04-29 PROCEDURE — 82140 ASSAY OF AMMONIA: CPT

## 2018-04-29 PROCEDURE — 96375 TX/PRO/DX INJ NEW DRUG ADDON: CPT

## 2018-04-29 PROCEDURE — 83036 HEMOGLOBIN GLYCOSYLATED A1C: CPT

## 2018-04-29 PROCEDURE — 87075 CULTR BACTERIA EXCEPT BLOOD: CPT

## 2018-04-29 PROCEDURE — 82962 GLUCOSE BLOOD TEST: CPT

## 2018-04-29 PROCEDURE — 86334 IMMUNOFIX E-PHORESIS SERUM: CPT

## 2018-04-29 PROCEDURE — 81332 SERPINA1 GENE: CPT

## 2018-04-29 PROCEDURE — 88305 TISSUE EXAM BY PATHOLOGIST: CPT

## 2018-04-29 PROCEDURE — 82105 ALPHA-FETOPROTEIN SERUM: CPT

## 2018-04-29 PROCEDURE — 99285 EMERGENCY DEPT VISIT HI MDM: CPT | Mod: 25

## 2018-04-29 PROCEDURE — 85652 RBC SED RATE AUTOMATED: CPT

## 2018-04-29 PROCEDURE — 94760 N-INVAS EAR/PLS OXIMETRY 1: CPT

## 2018-04-29 PROCEDURE — 93005 ELECTROCARDIOGRAM TRACING: CPT

## 2018-04-29 PROCEDURE — 83935 ASSAY OF URINE OSMOLALITY: CPT

## 2018-04-29 PROCEDURE — 83690 ASSAY OF LIPASE: CPT

## 2018-04-29 PROCEDURE — 88313 SPECIAL STAINS GROUP 2: CPT

## 2018-04-29 PROCEDURE — P9047: CPT

## 2018-04-29 PROCEDURE — 84165 PROTEIN E-PHORESIS SERUM: CPT

## 2018-04-29 PROCEDURE — 82042 OTHER SOURCE ALBUMIN QUAN EA: CPT

## 2018-04-29 PROCEDURE — 85730 THROMBOPLASTIN TIME PARTIAL: CPT

## 2018-04-29 PROCEDURE — 84155 ASSAY OF PROTEIN SERUM: CPT

## 2018-04-29 PROCEDURE — 87522 HEPATITIS C REVRS TRNSCRPJ: CPT

## 2018-04-29 PROCEDURE — 85610 PROTHROMBIN TIME: CPT

## 2018-04-29 PROCEDURE — 47000 NEEDLE BIOPSY OF LIVER PERQ: CPT

## 2018-04-29 PROCEDURE — 76942 ECHO GUIDE FOR BIOPSY: CPT

## 2018-04-29 PROCEDURE — 94640 AIRWAY INHALATION TREATMENT: CPT

## 2018-04-29 PROCEDURE — 96376 TX/PRO/DX INJ SAME DRUG ADON: CPT

## 2018-04-29 PROCEDURE — 93970 EXTREMITY STUDY: CPT

## 2018-04-29 PROCEDURE — 86038 ANTINUCLEAR ANTIBODIES: CPT

## 2018-04-29 PROCEDURE — 76705 ECHO EXAM OF ABDOMEN: CPT

## 2018-04-29 PROCEDURE — 71260 CT THORAX DX C+: CPT

## 2018-04-29 PROCEDURE — 82728 ASSAY OF FERRITIN: CPT

## 2018-04-29 PROCEDURE — 86376 MICROSOMAL ANTIBODY EACH: CPT

## 2018-04-29 PROCEDURE — 71045 X-RAY EXAM CHEST 1 VIEW: CPT

## 2018-04-29 PROCEDURE — 85027 COMPLETE CBC AUTOMATED: CPT

## 2018-04-29 PROCEDURE — 83615 LACTATE (LD) (LDH) ENZYME: CPT

## 2018-04-29 PROCEDURE — 84300 ASSAY OF URINE SODIUM: CPT

## 2018-04-29 PROCEDURE — 80048 BASIC METABOLIC PNL TOTAL CA: CPT

## 2018-04-29 PROCEDURE — 82945 GLUCOSE OTHER FLUID: CPT

## 2018-04-29 PROCEDURE — 82550 ASSAY OF CK (CPK): CPT

## 2018-04-29 PROCEDURE — 87205 SMEAR GRAM STAIN: CPT

## 2018-04-29 PROCEDURE — 86381 MITOCHONDRIAL ANTIBODY EACH: CPT

## 2018-04-29 PROCEDURE — 84484 ASSAY OF TROPONIN QUANT: CPT

## 2018-04-29 PROCEDURE — 86255 FLUORESCENT ANTIBODY SCREEN: CPT

## 2018-04-29 PROCEDURE — 89051 BODY FLUID CELL COUNT: CPT

## 2018-04-29 PROCEDURE — 99221 1ST HOSP IP/OBS SF/LOW 40: CPT

## 2018-04-29 PROCEDURE — 83550 IRON BINDING TEST: CPT

## 2018-04-29 PROCEDURE — 87086 URINE CULTURE/COLONY COUNT: CPT

## 2018-04-29 PROCEDURE — 82570 ASSAY OF URINE CREATININE: CPT

## 2018-04-29 PROCEDURE — 82803 BLOOD GASES ANY COMBINATION: CPT

## 2018-04-29 RX ORDER — ALPRAZOLAM 0.25 MG
0.12 TABLET ORAL ONCE
Qty: 0 | Refills: 0 | Status: DISCONTINUED | OUTPATIENT
Start: 2018-04-29 | End: 2018-04-29

## 2018-04-29 RX ORDER — ALBUMIN HUMAN 25 %
50 VIAL (ML) INTRAVENOUS EVERY 6 HOURS
Qty: 0 | Refills: 0 | Status: DISCONTINUED | OUTPATIENT
Start: 2018-04-29 | End: 2018-04-29

## 2018-04-29 RX ORDER — SENNA PLUS 8.6 MG/1
2 TABLET ORAL
Qty: 0 | Refills: 0 | COMMUNITY
Start: 2018-04-29

## 2018-04-29 RX ORDER — LIDOCAINE 4 G/100G
0 CREAM TOPICAL
Qty: 0 | Refills: 0 | COMMUNITY
Start: 2018-04-29

## 2018-04-29 RX ORDER — RIVAROXABAN 15 MG-20MG
15 KIT ORAL EVERY 24 HOURS
Qty: 0 | Refills: 0 | Status: DISCONTINUED | OUTPATIENT
Start: 2018-04-29 | End: 2018-04-29

## 2018-04-29 RX ORDER — ALPRAZOLAM 0.25 MG
1 TABLET ORAL
Qty: 0 | Refills: 0 | COMMUNITY
Start: 2018-04-29

## 2018-04-29 RX ORDER — DRONABINOL 2.5 MG
1 CAPSULE ORAL
Qty: 0 | Refills: 0 | COMMUNITY
Start: 2018-04-29

## 2018-04-29 RX ORDER — ALPRAZOLAM 0.25 MG
0.25 TABLET ORAL THREE TIMES A DAY
Qty: 0 | Refills: 0 | Status: DISCONTINUED | OUTPATIENT
Start: 2018-04-29 | End: 2018-04-29

## 2018-04-29 RX ORDER — RIVAROXABAN 15 MG-20MG
1 KIT ORAL
Qty: 0 | Refills: 0 | COMMUNITY
Start: 2018-04-29

## 2018-04-29 RX ORDER — MIDODRINE HYDROCHLORIDE 2.5 MG/1
3 TABLET ORAL
Qty: 0 | Refills: 0 | COMMUNITY
Start: 2018-04-29

## 2018-04-29 RX ORDER — DIGOXIN 250 MCG
1 TABLET ORAL
Qty: 0 | Refills: 0 | COMMUNITY
Start: 2018-04-29

## 2018-04-29 RX ADMIN — MIDODRINE HYDROCHLORIDE 15 MILLIGRAM(S): 2.5 TABLET ORAL at 14:06

## 2018-04-29 RX ADMIN — PANTOPRAZOLE SODIUM 40 MILLIGRAM(S): 20 TABLET, DELAYED RELEASE ORAL at 06:28

## 2018-04-29 RX ADMIN — Medication 0.12 MILLIGRAM(S): at 14:06

## 2018-04-29 RX ADMIN — MIDODRINE HYDROCHLORIDE 15 MILLIGRAM(S): 2.5 TABLET ORAL at 06:27

## 2018-04-29 RX ADMIN — PIPERACILLIN AND TAZOBACTAM 25 GRAM(S): 4; .5 INJECTION, POWDER, LYOPHILIZED, FOR SOLUTION INTRAVENOUS at 14:07

## 2018-04-29 RX ADMIN — ENOXAPARIN SODIUM 70 MILLIGRAM(S): 100 INJECTION SUBCUTANEOUS at 06:27

## 2018-04-29 RX ADMIN — Medication 50 MILLILITER(S): at 10:08

## 2018-04-29 RX ADMIN — PIPERACILLIN AND TAZOBACTAM 25 GRAM(S): 4; .5 INJECTION, POWDER, LYOPHILIZED, FOR SOLUTION INTRAVENOUS at 06:27

## 2018-04-29 RX ADMIN — Medication 2.5 MILLIGRAM(S): at 06:28

## 2018-04-29 NOTE — DISCHARGE NOTE ADULT - CARE PROVIDER_API CALL
Beti Serrato (DO), Medicine  [address] [place] [address]  Phone: [phone]  Fax: [phone]

## 2018-04-29 NOTE — PROGRESS NOTE ADULT - ATTENDING COMMENTS
71 yo M with Hep C, COPD, CHF, presenting with abnormal LFTs, found to have large liver mass, extensive mediastinal LAD, spiculated BRENT nodule concerning for advanced malignancy, course  complicated by uncontrolled Afib and suspected septic shock.  Shock resolved but with poorly controlled afib with mild hypotension.    --pain control with oxycodone prn, lidocaine patch for hip pain  --Afib, continue digoxin, full dose AC with lovenox  improved after albumin  continue midodrine for hypotension  --respiratory status stable  COPD, cont duoneb  --mild DAVID, improved  --continue PO diet, marinol for appetite stimulant  --empiric broad spectrum Abx with zosyn  transition to PO levaquin when ready for home  --prelim liver Bx results disucssed with Dr. Ramirez on 4/27, suspicious for malignancy but no final diagnosis, awaiting immuno stains  --pt now would like to proceed with hospice.  Would like to go home today.  Stable for d/c home with digoxin, midodrine.  Would defer AC since pt is fall risk.  Transition zosyn for levaquin.  MOLST has been completed.  Discussed plan with pt and family at bedside.
73 yo M with Hep C, COPD, CHF, presenting with abnormal LFTs, found to have large liver mass, extensive mediastinal LAD, spiculated BRENT nodule concerning for advanced malignancy, course now complicated by uncontrolled Afib and suspected septic shock.  Shock resolved but still poorly controlled afib with mild hypotension.    --pain control with oxycodone prn, lidocaine patch for hip pain  --Afib, continue digoxin, full dose AC with lovenox  not able to tolerate BB at this time due to hypotension  continue midodrine for hypotension  albumin challenge to see if this may improve hemodynamics  --respiratory status stable  COPD, cont duoneb  --mild DAVID, improved  --continue PO diet, marinol for appetite stimulant  --empiric broad spectrum Abx with zosyn  f/u panCx, unclear source at this time  transition to PO levaquin when ready for home  --prelim liver Bx results disucssed with Dr. Ramirez on 4/27, suspicious for malignancy but no final diagnosis, awaiting immuno stains  --pt wants to go home with comfort care.  LISS completed yesterday.  Has refused hospice at this time mainly because he feels his wife is not ready for that, although he still is very much in favor of comfort care an minimal medical interventions.  Given his uncontrolled Afib, he is agreeable to staying another day to optimize him for discharge home.  He remains DNR/DNI.    --plan discussed with pt and family at bedside  --pt critically ill. CC time 50min
Advanced care planning was discussed with patient and family.  Advanced care planning forms were reviewed and discussed.  Risks, benefits and alternatives of gastroenterologic procedures were discussed in detail and all questions were answered.    25 minutes spent.
73 yo M with Hep C, COPD, CHF, presenting with abnormal LFTs, found to have large liver mass, extensive mediastinal LAD, spiculated BRENT nodule concerning for advanced malignancy, course now complicated by uncontrolled Afib and suspected septic shock.    --pain control with oxycodone prn  start lidocaine patch for hip pain  --Afib better controlled, continue digoxin  possible septic shock on very low dose levophed, can likely wean off today, continue midodrine  full dose AC with lovenox  --respiratory status stable  COPD, cont duoneb  --mild DAVID, check urine lytes  --continue PO diet, marinol for appetite stimulant  --empiric broad spectrum Abx with zosyn  f/u panCx, unclear source at this time  --prelim liver Bx results disucssed with Dr. Ramirez, suspicious for malignancy but no final diagnosis, awaiting immuno stains  --family meeting with sister (HCP), pt, and wife.  He wants to go home and live comfortably.  He is not interested in further workup because he does not want to spend time going to doctor visits and in the hospital.  He would like to go home asap.  I discussed that he is currently on low dose of vasopressors.  We will try to wean off today and then may possibly d/c to home hospice with PO abx.  His sister and wife are in agreement with this plan.  Will make hospice referral.  Now DNR/DNI.    --pt critically ill. CC time 60min
I sat with wife and patient at length  I explained the gravity and prognosis
I sat with patient and discussed case at length
I spoke to wife at length

## 2018-04-29 NOTE — DISCHARGE NOTE ADULT - ADDITIONAL INSTRUCTIONS
dig qd  [id number] day keep bp above 90 and less 140  percoset pain  xanax anxiety  finish augmentin  marinol for appetitie  boost or ensure 3 xday  I will call hospice again,tomorrow since patient refused them yesterday

## 2018-04-29 NOTE — PROGRESS NOTE ADULT - SUBJECTIVE AND OBJECTIVE BOX
INTERVAL HX:   per ICU note, ambulated around ICU yesterday.   No CP/SOB/N/V/F/Diarrhea.  s/p dose of albumin  Chart reviewed and events noted;     MEDICATIONS  (STANDING):  albumin human 25% IVPB 50 milliLiter(s) IV Intermittent every 6 hours  ALBUTerol/ipratropium for Nebulization 3 milliLiter(s) Nebulizer every 8 hours  digoxin     Tablet 0.125 milliGRAM(s) Oral daily  dronabinol 2.5 milliGRAM(s) Oral two times a day  enoxaparin Injectable 70 milliGRAM(s) SubCutaneous every 12 hours  lidocaine   Patch 2 Patch Transdermal daily  midodrine 15 milliGRAM(s) Oral every 8 hours  pantoprazole    Tablet 40 milliGRAM(s) Oral before breakfast  piperacillin/tazobactam IVPB. 3.375 Gram(s) IV Intermittent every 8 hours  senna 2 Tablet(s) Oral at bedtime    MEDICATIONS  (PRN):  oxyCODONE    5 mG/acetaminophen 325 mG 1 Tablet(s) Oral every 4 hours PRN Moderate Pain (4 - 6)      Vital Signs Last 24 Hrs  T(C): 36.6 (29 Apr 2018 00:14), Max: 37.3 (28 Apr 2018 20:40)  T(F): 97.8 (29 Apr 2018 00:14), Max: 99.1 (28 Apr 2018 20:40)  HR: 131 (29 Apr 2018 07:00) (114 - 139)  BP: 93/74 (29 Apr 2018 07:00) (84/54 - 122/71)  BP(mean): 80 (29 Apr 2018 07:00) (62 - 89)  RR: 24 (29 Apr 2018 07:00) (20 - 29)  SpO2: 93% (29 Apr 2018 07:00) (85% - 96%)    PHYSICAL EXAM  General: WN, thin, WD adult in NAD  HEENT: clear oropharynx, anicteric sclera, pink conjunctivae  Neck: supple  CV: normal S1S2, RRR, no murmur, no rubs, no gallops  Lungs: clear to auscultation BL, no wheezes, no rales, no rhonchi  Abdomen: soft, non-tender, non-distended, no hepatosplenomegaly  Ext: no clubbing, no cyanosis, no edema  Skin: no rashes, no petichiae  Neuro: alert and oriented X3, no focal deficits      LABS:                        12.1   14.4  )-----------( x        ( 29 Apr 2018 07:05 )             37.0     04-29    141  |  104  |  31<H>  ----------------------------<  88  3.4<L>   |  24  |  1.30    Ca    7.9<L>      29 Apr 2018 07:05  Phos  1.9     04-29  Mg     2.3     04-29    TPro  5.3<L>  /  Alb  2.4<L>  /  TBili  3.9<H>  /  DBili  x   /  AST  371<H>  /  ALT  123<H>  /  AlkPhos  239<H>  04-28    PT/INR - ( 28 Apr 2018 06:03 )   PT: 17.2 sec;   INR: 1.56 ratio    PTT - ( 28 Apr 2018 06:03 )  PTT:69.4 sec    Carcinoembryonic Antigen (04.23.18 @ 23:43)    Carcinoembryonic Antigen: 10.6    Alpha Fetoprotein - Tumor Marker (04.23.18 @ 23:43)    Alpha Fetoprotein - Tumor Marker: 51.5            RADIOLOGY & ADDITIONAL STUDIES: INTERVAL HX:   per ICU note, ambulated around ICU yesterday.   No CP/SOB/N/V/F/Diarrhea.  s/p dose of albumin  nephew and brother-in-law at bedside. Pt has two sisters.   Chart reviewed and events noted;     MEDICATIONS  (STANDING):  albumin human 25% IVPB 50 milliLiter(s) IV Intermittent every 6 hours  ALBUTerol/ipratropium for Nebulization 3 milliLiter(s) Nebulizer every 8 hours  digoxin     Tablet 0.125 milliGRAM(s) Oral daily  dronabinol 2.5 milliGRAM(s) Oral two times a day  enoxaparin Injectable 70 milliGRAM(s) SubCutaneous every 12 hours  lidocaine   Patch 2 Patch Transdermal daily  midodrine 15 milliGRAM(s) Oral every 8 hours  pantoprazole    Tablet 40 milliGRAM(s) Oral before breakfast  piperacillin/tazobactam IVPB. 3.375 Gram(s) IV Intermittent every 8 hours  senna 2 Tablet(s) Oral at bedtime    MEDICATIONS  (PRN):  oxyCODONE    5 mG/acetaminophen 325 mG 1 Tablet(s) Oral every 4 hours PRN Moderate Pain (4 - 6)      Vital Signs Last 24 Hrs  T(C): 36.6 (29 Apr 2018 00:14), Max: 37.3 (28 Apr 2018 20:40)  T(F): 97.8 (29 Apr 2018 00:14), Max: 99.1 (28 Apr 2018 20:40)  HR: 131 (29 Apr 2018 07:00) (114 - 139)  BP: 93/74 (29 Apr 2018 07:00) (84/54 - 122/71)  BP(mean): 80 (29 Apr 2018 07:00) (62 - 89)  RR: 24 (29 Apr 2018 07:00) (20 - 29)  SpO2: 93% (29 Apr 2018 07:00) (85% - 96%)    PHYSICAL EXAM  General: WN, thin, WD adult in NAD  HEENT: clear oropharynx, icteric sclera, pink conjunctivae  Neck: supple  CV: normal S1S2, RRR, no murmur, no rubs, no gallops  Lungs: clear to auscultation BL, no wheezes, no rales, no rhonchi  Abdomen: soft, non-tender, moderate-distended, no hepatosplenomegaly  Ext: no clubbing, no cyanosis, no edema  Skin: no rashes, no petichiae  Neuro: alert and oriented X3, no focal deficits      LABS:                        12.1   14.4  )-----------( x        ( 29 Apr 2018 07:05 )             37.0     04-29    141  |  104  |  31<H>  ----------------------------<  88  3.4<L>   |  24  |  1.30    Ca    7.9<L>      29 Apr 2018 07:05  Phos  1.9     04-29  Mg     2.3     04-29    TPro  5.3<L>  /  Alb  2.4<L>  /  TBili  3.9<H>  /  DBili  x   /  AST  371<H>  /  ALT  123<H>  /  AlkPhos  239<H>  04-28    PT/INR - ( 28 Apr 2018 06:03 )   PT: 17.2 sec;   INR: 1.56 ratio    PTT - ( 28 Apr 2018 06:03 )  PTT:69.4 sec    Carcinoembryonic Antigen (04.23.18 @ 23:43)    Carcinoembryonic Antigen: 10.6    Alpha Fetoprotein - Tumor Marker (04.23.18 @ 23:43)    Alpha Fetoprotein - Tumor Marker: 51.5            RADIOLOGY & ADDITIONAL STUDIES:

## 2018-04-29 NOTE — PROGRESS NOTE ADULT - ASSESSMENT
71 yo M with hx of HCV, cirrhosis, presented with abdominal pain, SOB and was found to have7 cm  right hepatic mass with extensive tumoral thrombosis, ascites, spiculated RUL mass with confluent right hilar and mediastinal lymphadenopathy.    Findings are concerning for  advanced malignancy. s/p CT guided  paracentesis and liver Bx by Dr Dupree   cytology path was sent from fluid, which showed no malignant cells.   Pending pathology from liver Bx. CEA 10, AFP 51.     Had d/w Dr Machuca: possible need tissue from the lung (to be decided btw Dr Dodge and Dr Machuca method to obtain. But now pt rather go home, not go for doctors appointments.      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 Worsening renal failure.     RECOMMEND  Continue lovenox.   Palliative care eval.   Hospice referral, refused by pt due to concern wife not ready. .   Await path from liver biopsy. Discussed nn-diagnostic ascites fluid with pt, and aware of pending additional pathology specimen.   supportive measure per ICU  Emotional support, corbin with wife.   continue medical and cardiac mgmt. 73 yo M with hx of HCV, cirrhosis, presented with abdominal pain, SOB and was found to have7 cm  right hepatic mass with extensive tumoral thrombosis, ascites, spiculated RUL mass with confluent right hilar and mediastinal lymphadenopathy.    Findings are concerning for  advanced malignancy. s/p CT guided  paracentesis and liver Bx by Dr Dupree   cytology path was sent from fluid, which showed no malignant cells.   Pending pathology from liver Bx. CEA 10, AFP 51.     Had d/w Dr Machuca: possible need tissue from the lung (to be decided btw Dr Dodge and Dr Machuca method to obtain. But now pt rather go home, not go for doctors appointments.      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 Worsening renal failure.     RECOMMEND  Await path from liver biopsy. Discussed nn-diagnostic ascites fluid with pt, and aware of pending additional pathology specimen.     Continue Lovenox for now. Outpt, if pt does not wish for treatment of cancer, whether to treat for portal vein thrombosis or tumor thrombosis, likely to make little difference in overall outcome.   As such, if pt desires can be DC home with anticoagulation such as Lovenox or NOAC, or if pt wishes for least amount of medication, then reasonable to forego, given pt other wishes.   Per cardiology, however may be fall risk. Unfortunately no zero risk option. If needed, I can discuss with pt and family in more detail again.   Hospice referral, refused by pt due to concern wife not ready. .   Palliative care eval.   Emotional support, corbin with wife.     supportive measure per ICU  continue medical and cardiac mgmt.

## 2018-04-29 NOTE — DISCHARGE NOTE ADULT - MEDICATION SUMMARY - MEDICATIONS TO TAKE
I will START or STAY ON the medications listed below when I get home from the hospital:    oxyCODONE-acetaminophen 5 mg-325 mg oral tablet  -- 1 tab(s) by mouth every 4 hours, As needed, Moderate Pain (4 - 6)  -- Indication: For Malignant neoplasm of liver, unspecified liver malignancy type    digoxin 125 mcg (0.125 mg) oral tablet  -- 1 tab(s) by mouth once a day  -- Indication: For Malignant neoplasm of liver, unspecified liver malignancy type    rivaroxaban 15 mg oral tablet  -- 1 tab(s) by mouth every 24 hours  -- Indication: For Malignant neoplasm of liver, unspecified liver malignancy type    dronabinol 2.5 mg oral capsule  -- 1 cap(s) by mouth 2 times a day  -- Indication: For Malignant neoplasm of liver, unspecified liver malignancy type    ALPRAZolam 0.25 mg oral tablet  -- 1 tab(s) by mouth 3 times a day, As needed, anxiety ,hold sedation  -- Indication: For Malignant neoplasm of liver, unspecified liver malignancy type    lidocaine 5% topical film  --  on skin to area 12 hrs on 12 hrs off  -- Indication: For Malignant neoplasm of liver, unspecified liver malignancy type    senna oral tablet  -- 2 tab(s) by mouth once a day (at bedtime)  -- Indication: For Malignant neoplasm of liver, unspecified liver malignancy type    midodrine 5 mg oral tablet  -- 3 tab(s) by mouth every 8 hours if systolic bp greater than 140 do not give  -- Indication: For Malignant neoplasm of liver, unspecified liver malignancy type    amoxicillin-clavulanate 875 mg-125 mg oral tablet  -- 1 tab(s) by mouth 2 times a day for 5 days  -- Indication: For Malignant neoplasm of liver, unspecified liver malignancy type

## 2018-04-29 NOTE — PROGRESS NOTE ADULT - PROBLEM SELECTOR PROBLEM 1
Liver cancer
Lung mass
Malignant neoplasm of liver, unspecified liver malignancy type
Persistent atrial fibrillation
Fever
Fever
Liver cancer
Liver cancer

## 2018-04-29 NOTE — PROGRESS NOTE ADULT - PROBLEM SELECTOR PROBLEM 4
Hepatic vein thrombosis
Hepatic vein thrombosis
Atrial fibrillation, unspecified type
Hepatic vein thrombosis
Hepatic vein thrombosis
Lung mass

## 2018-04-29 NOTE — PROGRESS NOTE ADULT - PROBLEM SELECTOR PLAN 1
-AFP/CEA elevated  -s/p IR guided bx/paracentesis 4/25; para revealed clear fluid, micro neg, cx no growth to date, cyto neg  -f/u bx results  patient would not like any further work up   would like to be discharged with home hospice  possibly d/c today   on marrinol for decreased appetite

## 2018-04-29 NOTE — DISCHARGE NOTE ADULT - PLAN OF CARE
patient filled molst form patient does not want any chemo  he does not want to be hospitalized/dnr/dni  wants hos[pice/and comfort measures only patient only wants to go home with comfort measures  marinol for apepetitie digoxin,,midodrine to keep bp elevated s/p bipap,,,due to lung mass patient filled molst  dnr/dni  comfort measures hep c lung liver mass,,bx liver pending refusing chemo  does not want any procedure/chemo xarelto 15mg qd,,discussed with oncology

## 2018-04-29 NOTE — PROGRESS NOTE ADULT - PROBLEM SELECTOR PLAN 3
-see above
eliot wu pending  hep c rna pending
rate better controlled  etio due to cancer vs all cardiac
s/p bx yesterday
per cardiology/medicine
per cardiology/medicine

## 2018-04-29 NOTE — PROGRESS NOTE ADULT - PROBLEM SELECTOR PLAN 5
Pt is requesting a refill on: Adderall 20 mg ph. # [phone]   She would like a Rx by Friday pt.  [place]   Routed to Rx -elevated leukocytosis, source unclear, s/p bx/para 4/25,   care per icu/ID  -cont abx

## 2018-04-29 NOTE — PROGRESS NOTE ADULT - SUBJECTIVE AND OBJECTIVE BOX
INTERVAL HPI/OVERNIGHT EVENTS:  pt seen and examined, remains in ICU, currently sitting up in chair. Offers no complaints  currently denies n/v/c, states that he has a decreases appetite       MEDICATIONS  (STANDING):  ALBUTerol/ipratropium for Nebulization 3 milliLiter(s) Nebulizer every 8 hours  enoxaparin Injectable 70 milliGRAM(s) SubCutaneous every 12 hours  midodrine 15 milliGRAM(s) Oral every 8 hours  norepinephrine Infusion 0.03 MICROgram(s)/kG/Min (4.084 mL/Hr) IV Continuous <Continuous>  pantoprazole    Tablet 40 milliGRAM(s) Oral before breakfast  piperacillin/tazobactam IVPB. 3.375 Gram(s) IV Intermittent every 8 hours  potassium chloride    Tablet ER 10 milliEquivalent(s) Oral daily  senna 2 Tablet(s) Oral at bedtime    MEDICATIONS  (PRN):  albumin human 25% IVPB 50 milliLiter(s) IV Intermittent once PRN see below  oxyCODONE    5 mG/acetaminophen 325 mG 1 Tablet(s) Oral every 4 hours PRN Moderate Pain (4 - 6)      Allergies    No Known Allergies    Intolerances        Review of Systems:    General:  No wt loss, fevers, chills, night sweats, fatigue   Eyes:  Good vision, no reported pain  ENT:  No sore throat, pain, runny nose, dysphagia  CV:  No pain, palpitations, hypo/hypertension  Resp:  No dyspnea, cough, tachypnea, wheezing  GI:  passing gas, mild abd pain, No nausea, No vomiting, No diarrhea, No constipation, No weight loss, No fever, No pruritis, No rectal bleeding, No melena, No dysphagia  :  No pain, bleeding, incontinence, nocturia  Muscle:  No pain, weakness  Neuro:  No weakness, tingling, memory problems  Psych:  No fatigue, insomnia, mood problems, depression  Endocrine:  No polyuria, polydypsia, cold/heat intolerance  Heme:  No petechiae, ecchymosis, easy bruisability  Skin:  No rash, tattoos, scars, edema      Vital Signs Last 24 Hrs  T(C): 36.6 (29 Apr 2018 00:14), Max: 37.3 (28 Apr 2018 20:40)  T(F): 97.8 (29 Apr 2018 00:14), Max: 99.1 (28 Apr 2018 20:40)  HR: 131 (29 Apr 2018 07:00) (114 - 142)  BP: 93/74 (29 Apr 2018 07:00) (79/55 - 122/71)  BP(mean): 80 (29 Apr 2018 07:00) (62 - 89)  RR: 24 (29 Apr 2018 07:00) (20 - 29)  SpO2: 93% (29 Apr 2018 07:00) (75% - 96%)    PHYSICAL EXAM:    Constitutional: NAD, oob in chair  HEENT: EOMI, +scleral icterus  Neck: No LAD, supple  Respiratory: dec bs  Cardiovascular: S1 and S2, irreg  Gastrointestinal: BS+, soft, ttp ruq+dt  Extremities: +mild le edema   Vascular: 2+ peripheral pulses  Neurological: A/O x 3  Skin: +jaundice    LABS:                                                    12.1   14.4  )-----------( x        ( 29 Apr 2018 07:05 )             37.0   04-29    141  |  104  |  31<H>  ----------------------------<  88  3.4<L>   |  24  |  1.30    Ca    7.9<L>      29 Apr 2018 07:05  Phos  1.9     04-29  Mg     2.3     04-29    TPro  5.3<L>  /  Alb  2.4<L>  /  TBili  3.9<H>  /  DBili  x   /  AST  371<H>  /  ALT  123<H>  /  AlkPhos  239<H>  04-28        RADIOLOGY & ADDITIONAL TESTS:

## 2018-04-29 NOTE — PROGRESS NOTE ADULT - SUBJECTIVE AND OBJECTIVE BOX
24 hour events:   received albumin yesterday afternoon with transient improvement in HR and BP  ambulated once around ICU without difficulty    T(F): 97.8 (04-29-18 @ 00:14), Max: 99.1 (04-28-18 @ 20:40)  HR: 131 (04-29-18 @ 07:00) (114 - 142)  BP: 93/74 (04-29-18 @ 07:00) (79/55 - 122/71)  RR: 24 (04-29-18 @ 07:00) (20 - 29)  SpO2: 93% (04-29-18 @ 07:00) (75% - 96%)      I&O's Summary    28 Apr 2018 07:01  -  29 Apr 2018 07:00  --------------------------------------------------------  IN: 200 mL / OUT: 550 mL / NET: -350 mL        Physical Exam:   Gen:  Neuro:  HEENT:  Resp:  CVS:  Abd:  Ext:  Skin:    Meds:  piperacillin/tazobactam IVPB. IV Intermittent    digoxin     Tablet Oral  midodrine Oral      ALBUTerol/ipratropium for Nebulization Nebulizer    dronabinol Oral  oxyCODONE    5 mG/acetaminophen 325 mG Oral PRN      enoxaparin Injectable SubCutaneous    pantoprazole    Tablet Oral  senna Oral      albumin human 25% IVPB IV Intermittent      lidocaine   Patch Transdermal                              14.3   20.1  )-----------( 91       ( 28 Apr 2018 06:03 )             43.7       04-29    141  |  104  |  31<H>  ----------------------------<  88  3.4<L>   |  24  |  1.30    Ca    7.9<L>      29 Apr 2018 07:05  Phos  1.9     04-29  Mg     2.3     04-29    TPro  5.3<L>  /  Alb  2.4<L>  /  TBili  3.9<H>  /  DBili  x   /  AST  371<H>  /  ALT  123<H>  /  AlkPhos  239<H>  04-28          PT/INR - ( 28 Apr 2018 06:03 )   PT: 17.2 sec;   INR: 1.56 ratio         PTT - ( 28 Apr 2018 06:03 )  PTT:69.4 sec    .Urine Clean Catch (Midstream)   No growth -- 04-27 @ 23:07  .Blood Blood-Peripheral   No growth to date. -- 04-26 @ 14:55  Peritoneal Peritoneal Fluid   No growth to date   No polymorphonuclear cells seen per low power field  No organisms seen per oil power field  by cytocentrifuge 04-25 @ 16:51              Radiology: ***    Bedside ultrasound: ***    CENTRAL LINE: N/Y          DATE INSERTED:              REMOVE: Y/N  SMILEY: N/Y                       DATE INSERTED:              REMOVE: Y/N  A-LINE: N/Y                       DATE INSERTED:              REMOVE: Y/N    GLOBAL ISSUE/BEST PRACTICE:  Analgesia:  Sedation:  CAM-ICU:   HOB elevation: yes  Stress ulcer prophylaxis:  VTE prophylaxis:  Glycemic control:  Nutrition:    CODE STATUS: *** 24 hour events:   received albumin yesterday afternoon with transient improvement in HR and BP  ambulated once around ICU without difficulty  no complaints this am    T(F): 97.8 (04-29-18 @ 00:14), Max: 99.1 (04-28-18 @ 20:40)  HR: 131 (04-29-18 @ 07:00) (114 - 142)  BP: 93/74 (04-29-18 @ 07:00) (79/55 - 122/71)  RR: 24 (04-29-18 @ 07:00) (20 - 29)  SpO2: 93% (04-29-18 @ 07:00) (75% - 96%)      I&O's Summary    28 Apr 2018 07:01  -  29 Apr 2018 07:00  --------------------------------------------------------  IN: 200 mL / OUT: 550 mL / NET: -350 mL        Physical Exam:   Gen: sitting comfortably in chair  Neuro: A and O x 3  Resp: CTABL  CVS: tachy, irreg irreg  Abd: soft, NTND  Ext: 1+ edema of RLE  Skin: WWP    Meds:  piperacillin/tazobactam IVPB. IV Intermittent    digoxin     Tablet Oral  midodrine Oral      ALBUTerol/ipratropium for Nebulization Nebulizer    dronabinol Oral  oxyCODONE    5 mG/acetaminophen 325 mG Oral PRN      enoxaparin Injectable SubCutaneous    pantoprazole    Tablet Oral  senna Oral      albumin human 25% IVPB IV Intermittent      lidocaine   Patch Transdermal                              14.3   20.1  )-----------( 91       ( 28 Apr 2018 06:03 )             43.7       04-29    141  |  104  |  31<H>  ----------------------------<  88  3.4<L>   |  24  |  1.30    Ca    7.9<L>      29 Apr 2018 07:05  Phos  1.9     04-29  Mg     2.3     04-29    TPro  5.3<L>  /  Alb  2.4<L>  /  TBili  3.9<H>  /  DBili  x   /  AST  371<H>  /  ALT  123<H>  /  AlkPhos  239<H>  04-28          PT/INR - ( 28 Apr 2018 06:03 )   PT: 17.2 sec;   INR: 1.56 ratio         PTT - ( 28 Apr 2018 06:03 )  PTT:69.4 sec    .Urine Clean Catch (Midstream)   No growth -- 04-27 @ 23:07    .Blood Blood-Peripheral   No growth to date. -- 04-26 @ 14:55    Peritoneal Peritoneal Fluid   No growth to date   No polymorphonuclear cells seen per low power field  No organisms seen per oil power field  by cytocentrifuge 04-25 @ 16:51    CENTRAL LINE: N  SMILEY: N  A-LINE: N    GLOBAL ISSUE/BEST PRACTICE:  Analgesia: Y  Sedation: N  CAM-ICU: NEG  HOB elevation: yes  Stress ulcer prophylaxis: Y  VTE prophylaxis: Y, AC  Glycemic control:Y  Nutrition: Y    CODE STATUS: DNR, DNI 24 hour events:   received albumin yesterday afternoon with transient improvement in HR and BP  ambulated once around ICU without difficulty  no complaints this am  is reconsidering hospice and would like to discuss with     T(F): 97.8 (04-29-18 @ 00:14), Max: 99.1 (04-28-18 @ 20:40)  HR: 131 (04-29-18 @ 07:00) (114 - 142)  BP: 93/74 (04-29-18 @ 07:00) (79/55 - 122/71)  RR: 24 (04-29-18 @ 07:00) (20 - 29)  SpO2: 93% (04-29-18 @ 07:00) (75% - 96%)      I&O's Summary    28 Apr 2018 07:01  -  29 Apr 2018 07:00  --------------------------------------------------------  IN: 200 mL / OUT: 550 mL / NET: -350 mL        Physical Exam:   Gen: sitting comfortably in chair  Neuro: A and O x 3  Resp: CTABL  CVS: tachy, irreg irreg  Abd: soft, NTND  Ext: 1+ edema of RLE  Skin: WWP    Meds:  piperacillin/tazobactam IVPB. IV Intermittent    digoxin     Tablet Oral  midodrine Oral      ALBUTerol/ipratropium for Nebulization Nebulizer    dronabinol Oral  oxyCODONE    5 mG/acetaminophen 325 mG Oral PRN      enoxaparin Injectable SubCutaneous    pantoprazole    Tablet Oral  senna Oral      albumin human 25% IVPB IV Intermittent      lidocaine   Patch Transdermal                              14.3   20.1  )-----------( 91       ( 28 Apr 2018 06:03 )             43.7       04-29    141  |  104  |  31<H>  ----------------------------<  88  3.4<L>   |  24  |  1.30    Ca    7.9<L>      29 Apr 2018 07:05  Phos  1.9     04-29  Mg     2.3     04-29    TPro  5.3<L>  /  Alb  2.4<L>  /  TBili  3.9<H>  /  DBili  x   /  AST  371<H>  /  ALT  123<H>  /  AlkPhos  239<H>  04-28          PT/INR - ( 28 Apr 2018 06:03 )   PT: 17.2 sec;   INR: 1.56 ratio         PTT - ( 28 Apr 2018 06:03 )  PTT:69.4 sec    .Urine Clean Catch (Midstream)   No growth -- 04-27 @ 23:07    .Blood Blood-Peripheral   No growth to date. -- 04-26 @ 14:55    Peritoneal Peritoneal Fluid   No growth to date   No polymorphonuclear cells seen per low power field  No organisms seen per oil power field  by cytocentrifuge 04-25 @ 16:51    CENTRAL LINE: N  SMILEY: N  A-LINE: N    GLOBAL ISSUE/BEST PRACTICE:  Analgesia: Y  Sedation: N  CAM-ICU: NEG  HOB elevation: yes  Stress ulcer prophylaxis: Y  VTE prophylaxis: Y, AC  Glycemic control:Y  Nutrition: Y    CODE STATUS: DNR, DNI

## 2018-04-29 NOTE — CHART NOTE - NSCHARTNOTEFT_GEN_A_CORE
I reviewed all meds with family and wife  I discussed pros cons of xarelto due to unsteady gait and poratl vein thrombosis/afib  risk of bleeding for blood thinner,,they said they understood  I discussed to be cautious with xanax and percoset,due to sedation and risk falling  I discussed he may have massive mi/cva due to afib  I reviewed meds with pharmacist and cost and family  patient is very clear of mind  does not want any medical treatment  wants to be comfortable

## 2018-04-29 NOTE — DISCHARGE NOTE ADULT - PATIENT PORTAL LINK FT
You can access the [facility], offered by [facility], by registering with the following website: [email or web address]

## 2018-04-29 NOTE — PROGRESS NOTE ADULT - PROVIDER SPECIALTY LIST ADULT
Cardiology
Critical Care
Gastroenterology
Gastroenterology
Heme/Onc
Infectious Disease
Infectious Disease
Internal Medicine
Pulmonology
Critical Care
Heme/Onc
Internal Medicine
Gastroenterology

## 2018-04-29 NOTE — DISCHARGE NOTE ADULT - SECONDARY DIAGNOSIS.
Lung mass Atrial fibrillation, unspecified type Acute on chronic respiratory failure with hypoxia and hypercapnia Hepatic cirrhosis, unspecified hepatic cirrhosis type, unspecified whether ascites present Portal vein thrombosis

## 2018-04-29 NOTE — PROGRESS NOTE ADULT - PROBLEM SELECTOR PROBLEM 2
Cirrhosis
Acute on chronic respiratory failure with hypoxia and hypercapnia
Cirrhosis
Cirrhosis
Lung mass
Malignant neoplasm of liver, unspecified liver malignancy type
Acute respiratory failure
Acute respiratory failure

## 2018-04-29 NOTE — DISCHARGE NOTE ADULT - MEDICATION SUMMARY - MEDICATIONS TO STOP TAKING
I will STOP taking the medications listed below when I get home from the hospital:    atenolol 50 mg oral tablet  -- 1 tab(s) by mouth once a day    Lasix 20 mg oral tablet  -- 1 tab(s) by mouth once a day    losartan 25 mg oral tablet  -- 1 tab(s) by mouth once a day    Ed K+10 oral tablet, extended release  -- orally once a day    traMADol 50 mg oral tablet  -- orally 2 times a day

## 2018-04-29 NOTE — PROGRESS NOTE ADULT - PROBLEM SELECTOR PROBLEM 5
Sepsis
Sepsis
Hepatic cirrhosis, unspecified hepatic cirrhosis type, unspecified whether ascites present
Hepatic vein thrombosis
Sepsis

## 2018-04-29 NOTE — DISCHARGE NOTE ADULT - HOSPITAL COURSE
patient came in due to [facility] 2 weeks with elevation lft,,,found to have masses with lymph involvemnt chest and liver,,,had bx liver,pending result,,,s/p 1600cc thoracentesis,,,,went into rapid afib,,,,did not tolerate betablocker nor calcium channel blockers,,,bp bottomed.,,,on midodrin,,dig helped,,,,has refused all medical care,,,signed molst form wants dnr/dni no chemotherapy,,,only comfort care,,,he refused hospice yesrterday,,i will call them tomorrow,,i will send all meds to pharmacy,,patient is alert oriented 3x,,very clear minded,family at bedside  i explained he may have stroke/mi due to afib  i explained overall prognosis less 6months  all family understands,wife present

## 2018-04-29 NOTE — PROGRESS NOTE ADULT - SUBJECTIVE AND OBJECTIVE BOX
[facility] Cardiology    CHIEF COMPLAINT: Patient is a 72y old  Male who presents with a chief complaint of sob right upper quadrant pain for 2 weeks (23 Apr 2018 21:19)      Follow Up: [ ] Chest Pain      [ ] Dyspnea     [ ] Palpitations    [ ] Atrial Fibrillation     [ ] Ventricular Dysrhythmia    [ ] Abnormal EKG                      [ ] Abnormal Cardiac Enzymes     [ ] Valvular Disease    HPI:  patient came to the office for ruq pain  sob for 1 week  saw dr morse friday,lft mildly elevated,was told to be seen  patient has refused all preventative w/u in the past,except labs and bp medications  refused cxr/colonoscopy  when he came in today,i knew there was a problem,,he usually will come only once a year  he had ruq pain,hepatomegally,leg edema 2+  i sent him to the er,,i explained it may be cancer primarily or simpler like acute choly/pancreatitis  girl friend was present (23 Apr 2018 21:19)    Pt now in ICU   s/p norepinephrine Infusion 0.03 MICROgram(s)/kG/Min (4.084 mL/Hr) IV Continuous <Continuous> yesterday, now off  Beta Blocker held sec to hypotension  Pt tachycardic -120 bpm, AF  midodrine started        PAST MEDICAL & SURGICAL HISTORY:  Hip arthritis: lt  Hypertension  No significant past surgical history      MEDICATIONS  (STANDING):  albumin human 25% IVPB 50 milliLiter(s) IV Intermittent every 6 hours  ALBUTerol/ipratropium for Nebulization 3 milliLiter(s) Nebulizer every 8 hours  digoxin     Tablet 0.125 milliGRAM(s) Oral daily  dronabinol 2.5 milliGRAM(s) Oral two times a day  enoxaparin Injectable 70 milliGRAM(s) SubCutaneous every 12 hours  lidocaine   Patch 2 Patch Transdermal daily  midodrine 15 milliGRAM(s) Oral every 8 hours  pantoprazole    Tablet 40 milliGRAM(s) Oral before breakfast  piperacillin/tazobactam IVPB. 3.375 Gram(s) IV Intermittent every 8 hours  senna 2 Tablet(s) Oral at bedtime    MEDICATIONS  (PRN):  oxyCODONE    5 mG/acetaminophen 325 mG 1 Tablet(s) Oral every 4 hours PRN Moderate Pain (4 - 6)      Allergies    No Known Allergies    Intolerances        REVIEW OF SYSTEMS:    CONSTITUTIONAL: No weakness, fevers or chills.   EYES/ENT: No visual changes;  No vertigo or throat pain   NECK: No pain or stiffness  RESPIRATORY: No cough, wheezing, hemoptysis; No shortness of breath  CARDIOVASCULAR: No chest pain or palpitations  GASTROINTESTINAL: No abdominal or epigastric pain. No nausea, vomiting, or hematemesis; No diarrhea or constipation. No melena or hematochezia.  GENITOURINARY: No dysuria, frequency or hematuria  NEUROLOGICAL: No numbness or weakness  SKIN: No itching, burning, rashes, or lesions   All other review of systems is negative unless indicated above    Vital Signs Last 24 Hrs  T(C): 36.6 (29 Apr 2018 00:14), Max: 37.3 (28 Apr 2018 20:40)  T(F): 97.8 (29 Apr 2018 00:14), Max: 99.1 (28 Apr 2018 20:40)  HR: 131 (29 Apr 2018 07:00) (114 - 139)  BP: 93/74 (29 Apr 2018 07:00) (84/54 - 122/71)  BP(mean): 80 (29 Apr 2018 07:00) (62 - 89)  RR: 24 (29 Apr 2018 07:00) (20 - 29)  SpO2: 93% (29 Apr 2018 07:00) (85% - 96%)    I&O's Summary    28 Apr 2018 07:01  -  29 Apr 2018 07:00  --------------------------------------------------------  IN: 200 mL / OUT: 550 mL / NET: -350 mL        PHYSICAL EXAM:  Constitutional: acute distress, awake, alert  Neurological: Alert,   HEENT: no JVD, EOMI  Cardiovascular: irr, S1 and S2, tachy  Pulmonary: breath sounds bilaterally rhonchi  Gastrointestinal: Bowel Sounds present, soft, nontender  EXT:  peripheral edema  Skin: No rashes.  Psych:  Mood & affect appropriate    LABS: All Labs Reviewed:                        12.1   14.4  )-----------( 56       ( 29 Apr 2018 07:05 )             37.0     04-29    141  |  104  |  31<H>  ----------------------------<  88  3.4<L>   |  24  |  1.30    Ca    7.9<L>      29 Apr 2018 07:05  Phos  1.9     04-29  Mg     2.3     04-29    TPro  5.3<L>  /  Alb  2.4<L>  /  TBili  3.9<H>  /  DBili  x   /  AST  371<H>  /  ALT  123<H>  /  AlkPhos  239<H>  04-28    PT/INR - ( 28 Apr 2018 06:03 )   PT: 17.2 sec;   INR: 1.56 ratio         PTT - ( 28 Apr 2018 06:03 )  PTT:69.4 sec      Blood Culture: Organism --  Gram Stain Blood -- Gram Stain --  Specimen Source .Urine Clean Catch (Midstream)  Culture-Blood --    Organism --  Gram Stain Blood -- Gram Stain --  Specimen Source .Blood Blood-Peripheral  Culture-Blood --    Organism --  Gram Stain Blood -- Gram Stain   No polymorphonuclear cells seen per low power field  No organisms seen per oil power field  by cytocentrifuge  Specimen Source Peritoneal Peritoneal Fluid  Culture-Blood --        · Assessment		  72M getting work up for severe left Hip OA and replacement found with elevated liver chemistries, Right lower costal pain, with sono noted for liver mass, portal vein thrombosis. CT in hospital shows lung masses in addition to liver mass and ascites.  Afib with better rapid rate, relatively new onset (was in NSR 3/13/2018), chronic edema Acute on chronic diastolic HF (HFpEF), diffuse ST and T abnormalities on EKG, could be ischemic changes. These were noted before.    Pt now in ICU   s/p  norepinephrine Infusion 0.03 MICROgram(s)/kG/Min (4.084 mL/Hr) IV Continuous <Continuous> yesterday, now off pressors  BetaB held sec to ongoing hypotension  Pt tachycardic HR -110s, AF  s/p IV digoxin, on floor.  Now on DIG 0.125 mg PO qday  No Amio sec to cirrhosis  Midodrine added for BP support, now better at 103/71  Lovenox 70 mg q12  pt will AF hx, unsteady gait when ambulating with ICU staff. HIGH FALL RISK. Not an ideal/good A/C candidate.     Echo with rapid heart rates and possible anterior wall hypokinesis with EF 50-55%.  continue asa 81     Edema Ascites Pleural effusion/ CHF?/cirrhosis  - chronic edema despite lasix.  Hold lasix now sec to lowish BP.    Lung bx on hold  Pt wants comfort care measures and wants to go home today. He will need a 4 legged walker.    Overall guarded poor.

## 2018-04-29 NOTE — DISCHARGE NOTE ADULT - CARE PLAN
Principal Discharge DX:	Malignant neoplasm of liver, unspecified liver malignancy type  Goal:	patient filled molst form  Assessment and plan of treatment:	patient does not want any chemo  he does not want to be hospitalized/dnr/dni  wants hos[pice/and comfort measures only  Secondary Diagnosis:	Lung mass  Assessment and plan of treatment:	patient only wants to go home with comfort measures  marinol for apepetitie  Secondary Diagnosis:	Atrial fibrillation, unspecified type  Goal:	digoxin,,midodrine to keep bp elevated  Secondary Diagnosis:	Acute on chronic respiratory failure with hypoxia and hypercapnia  Goal:	s/p bipap,,,due to lung mass  Assessment and plan of treatment:	patient filled molst  dnr/dni  comfort measures  Secondary Diagnosis:	Hepatic cirrhosis, unspecified hepatic cirrhosis type, unspecified whether ascites present  Goal:	hep c lung liver mass,,bx liver pending  Assessment and plan of treatment:	refusing chemo  does not want any procedure/chemo  Secondary Diagnosis:	Portal vein thrombosis  Goal:	xarelto 15mg qd,,discussed with oncology

## 2018-04-29 NOTE — DISCHARGE NOTE ADULT - INSTRUCTIONS
boost or ensure 3x day  do not take any of the blood pressures at home  xarelto 1 daily  dig 1 daily  marinol 2x day for appetitie  percoset for pain as needed  xanax as needed for  anxiety and sleep  midodrine 5 mg 1-3 tab 3x day to keep bp above 90,,,if bp over 140 do not give  augmentin 2x day for 5 days

## 2018-04-29 NOTE — PROGRESS NOTE ADULT - PROBLEM SELECTOR PROBLEM 3
Hepatitis C antibody test positive
Atrial fibrillation, unspecified type
Hepatitis C antibody test positive
Malignant neoplasm of liver, unspecified liver malignancy type
Afib
Afib

## 2018-04-30 LAB
ALBUMIN FLD-MCNC: <0.5 G/DL — SIGNIFICANT CHANGE UP
CULTURE RESULTS: SIGNIFICANT CHANGE UP
SPECIMEN SOURCE: SIGNIFICANT CHANGE UP

## 2018-05-01 LAB
CULTURE RESULTS: SIGNIFICANT CHANGE UP
CULTURE RESULTS: SIGNIFICANT CHANGE UP
SPECIMEN SOURCE: SIGNIFICANT CHANGE UP
SPECIMEN SOURCE: SIGNIFICANT CHANGE UP

## 2020-02-07 NOTE — ED PROVIDER NOTE - OBJECTIVE STATEMENT
decreased urinary frequency, worse yesterday and able to urinate a little in the ED s complication.  pmd- jaylon [Negative] : Endocrine [FreeTextEntry9] : left hip
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
## 2023-08-14 NOTE — PROGRESS NOTE ADULT - ASSESSMENT
[place]    EMERGENCY DEPARTMENT ENCOUNTER      Pt Name: Jose Elias Taylor  MRN: [id number]  YOB: 1989  Date of evaluation: 8/14/2023  Provider: Georgi Zaldivar MD    CHIEF COMPLAINT       Chief Complaint   Patient presents with    Dizziness         HISTORY OF PRESENT ILLNESS   Jose Elias Taylor is a 33 y.o. male who presents to the emergency department with complaint of vertigo that began last night.  He reports some pain in his right ear associated with that that has since resolved.  He he states that his vertigo only occurs when he turns his head to the right.  Denies any visual changes as well as any blurred vision.  There are no peripheral paresthesias, weakness, or numbness.  He has had no difficulty ambulating.  Otherwise healthy.      Nursing notes were reviewed.    REVIEW OF SYSTEMS     ROS:  A chief complaint appropriate review of systems was completed and is negative except as noted in the HPI.      PAST MEDICAL HISTORY   None      SURGICAL HISTORY     No past surgical history on file.      CURRENT MEDICATIONS     No current facility-administered medications for this encounter.    Current Outpatient Medications:     meclizine (ANTIVERT) 25 MG tablet, Take 1 tablet by mouth Every 6 (Six) Hours As Needed for Dizziness., Disp: 12 tablet, Rfl: 0    ALLERGIES     Patient has no known allergies.    FAMILY HISTORY     No family history on file.       SOCIAL HISTORY       Social History     Socioeconomic History    Marital status: Single   Tobacco Use    Smoking status: Former     Types: Cigarettes   Substance and Sexual Activity    Alcohol use: Not Currently         PHYSICAL EXAM    (up to 7 for level 4, 8 or more for level 5)     Vitals:    08/14/23 1102 08/14/23 1130 08/14/23 1230 08/14/23 1258   BP: 124/90 129/91 121/92 125/90   BP Location: Left arm      Patient Position: Sitting      Pulse: 103 96 97 90   Resp: 16      Temp: 98.4 øF (36.9 øC)      TempSrc: Oral      SpO2: 100% 98% 97%  "97%   Weight: 65.8 kg (145 lb)      Height: 182.9 cm (72\")          General: Awake, alert, no acute distress.  HEENT: Conjunctivae normal.  Neck: Trachea midline.  Cardiac: Heart regular rate, rhythm, no murmurs, rubs, or gallops  Lungs: Lungs are clear to auscultation, there is no wheezing, rhonchi, or rales. There is no use of accessory muscles.  Chest wall: There is no tenderness to palpation over the chest wall or over ribs  Abdomen: Abdomen is soft, nontender, nondistended. There are no firm or pulsatile masses, no rebound rigidity or guarding.   Musculoskeletal: No deformity.  Neuro: Alert and oriented x4.  Cranial nerves II through XII are grossly intact.  There are no visual field deficits.  Cerebellar function intact with finger-to-nose and heel-to-shin testing.  Patient observed ambulating by myself and there is no evidence of ataxia or other gait abnormality.  Motor strength is intact in the face and strength is 5 out of 5 in all 4 extremities without any asymmetry.  Sensation to light touch is intact in all 4 extremities without any asymmetry.  Dermatology: Skin is warm and dry  Psych: Mentation is grossly normal, cognition is grossly normal. Affect is appropriate.        DIAGNOSTIC RESULTS     EKG: All EKGs are interpreted by the Emergency Department Physician who either signs or Co-signs this chart in the absence of a cardiologist.    No orders to display         RADIOLOGY:   [x] Radiologist's Report Reviewed:  CT Head Without Contrast   Final Result   Impression:   No acute intracranial findings.            Electronically Signed: Fausto Landis     8/14/2023 12:12 PM EDT     Workstation ID: [id number]          I ordered and independently reviewed the above noted radiographic studies.        LABS:    I have reviewed and interpreted all of the currently available lab results from this visit (if applicable):  Results for orders placed or performed during the hospital encounter of 08/08/23   COVID-19 and " 72m htn, + tobacco, here with jaundice  ascites, liver mass, lung mass, Hep C  complicated by afib  portal vein thrombosis  fever, leukocytosis and bandemia FLU A/B PCR - Swab, Nasopharynx    Specimen: Nasopharynx; Swab   Result Value Ref Range    COVID19 Not Detected Not Detected - Ref. Range    Influenza A PCR Not Detected Not Detected    Influenza B PCR Not Detected Not Detected   Comprehensive Metabolic Panel    Specimen: Blood   Result Value Ref Range    Glucose 94 65 - 99 mg/dL    BUN 15 6 - 20 mg/dL    Creatinine 0.73 (L) 0.76 - 1.27 mg/dL    Sodium 141 136 - 145 mmol/L    Potassium 4.2 3.5 - 5.2 mmol/L    Chloride 104 98 - 107 mmol/L    CO2 25.0 22.0 - 29.0 mmol/L    Calcium 9.4 8.6 - 10.5 mg/dL    Total Protein 7.4 6.0 - 8.5 g/dL    Albumin 4.7 3.5 - 5.2 g/dL    ALT (SGPT) 23 1 - 41 U/L    AST (SGOT) 18 1 - 40 U/L    Alkaline Phosphatase 63 39 - 117 U/L    Total Bilirubin 0.3 0.0 - 1.2 mg/dL    Globulin 2.7 gm/dL    A/G Ratio 1.7 g/dL    BUN/Creatinine Ratio 20.5 7.0 - 25.0    Anion Gap 12.0 5.0 - 15.0 mmol/L    eGFR 122.4 >60.0 mL/min/1.73   D-dimer, Quantitative    Specimen: Blood   Result Value Ref Range    D-Dimer, Quantitative <0.27 0.00 - 0.50 MCGFEU/mL   BNP    Specimen: Blood   Result Value Ref Range    proBNP <36.0 0.0 - 450.0 pg/mL   Single High Sensitivity Troponin T    Specimen: Blood   Result Value Ref Range    HS Troponin T <6 <15 ng/L   CBC Auto Differential    Specimen: Blood   Result Value Ref Range    WBC 8.52 3.40 - 10.80 10*3/mm3    RBC 4.88 4.14 - 5.80 10*6/mm3    Hemoglobin 15.2 13.0 - 17.7 g/dL    Hematocrit 44.4 37.5 - 51.0 %    MCV 91.0 79.0 - 97.0 fL    MCH 31.1 26.6 - 33.0 pg    MCHC 34.2 31.5 - 35.7 g/dL    RDW 11.6 (L) 12.3 - 15.4 %    RDW-SD 38.5 37.0 - 54.0 fl    MPV 9.1 6.0 - 12.0 fL    Platelets 358 140 - 450 10*3/mm3    Neutrophil % 61.3 42.7 - 76.0 %    Lymphocyte % 24.3 19.6 - 45.3 %    Monocyte % 8.5 5.0 - 12.0 %    Eosinophil % 3.3 0.3 - 6.2 %    Basophil % 0.8 0.0 - 1.5 %    Immature Grans % 1.8 (H) 0.0 - 0.5 %    Neutrophils, Absolute 5.23 1.70 - 7.00 10*3/mm3    Lymphocytes, Absolute 2.07 0.70 - 3.10 10*3/mm3     Monocytes, Absolute 0.72 0.10 - 0.90 10*3/mm3    Eosinophils, Absolute 0.28 0.00 - 0.40 10*3/mm3    Basophils, Absolute 0.07 0.00 - 0.20 10*3/mm3    Immature Grans, Absolute 0.15 (H) 0.00 - 0.05 10*3/mm3    nRBC 0.0 0.0 - 0.2 /100 WBC   ECG 12 Lead Chest Pain   Result Value Ref Range    QT Interval 374 ms    QTC Interval 431 ms        If labs were ordered, I independently reviewed the results and considered them in treating the patient.      EMERGENCY DEPARTMENT COURSE and DIFFERENTIAL DIAGNOSIS/MDM:   Vitals:  AS OF 20:49 EDT    BP - 125/90  HR - 90  TEMP - 98.4 øF (36.9 øC) (Oral)  O2 SATS - 97%        Discussion below represents my analysis of pertinent findings related to patient's condition, differential diagnosis, treatment plan and final disposition.      Differential diagnosis:  The differential diagnosis associated with the patient's presentation includes: ICH, intracranial mass, BPPV, labyrinthitis      Independent interpretations (ECG/rhythm strip/X-ray/US/CT scan): I independently interpreted the patient's head CT which shows no evidence of hemorrhage.  I independently interpreted the patient's cardiac monitor which shows sinus rhythm.      Care significantly affected by Social Determinants of Health (housing and economic circumstances, unemployment)    [] Yes     [x] No   If yes, Patient's care significantly limited by  Social Determinants of Health including:    [] Inadequate housing    [] Low income    [] Alcoholism and drug addiction in family    [] Problems related to primary support group    [] Unemployment    [] Problems related to employment    [] Other Social Determinants of Health:       I considered prescription management with:    [] Pain medication:   [] Antiviral:   [] Antibiotic:   [x] Other: Patient given meclizine with symptom improvement    ED Course:           Patient very well-appearing and nontoxic with history and physical examination strongly suggestive of peripheral vertigo.  I  did obtain a head CT because patient was complaining about some mild right-sided headache that occurred last night, however this was unremarkable.  He has no concerning neurologic deficits or other findings that are concerning on history or physical exam that would raise concern for central cause and do not feel that further evaluation is warranted at this point.  He had symptomatic improvement with meclizine during the course of his ED treatment.    I had a discussion with the patient/family regarding diagnosis, diagnostic results, treatment plan, and medications.  The patient/family indicated understanding of these instructions.  I spent adequate time at the bedside preceding discharge necessary to personally discuss the aftercare instructions, giving patient education, providing explanations of the results of our evaluations/findings, and my decision making to assure that the patient/family understand the plan of care.  Time was allotted to answer questions at that time and throughout the ED course.  Emphasis was placed on timely follow-up after discharge.  I also discussed the potential for the development of an acute emergent condition requiring further evaluation, admission, or even surgical intervention. I discussed that we found nothing during the visit today indicating the need for further workup, admission, or the presence of an unstable medical condition.  I encouraged the patient to return to the emergency department immediately for ANY concerns, worsening, new complaints, or if symptoms persist and unable to seek follow-up in a timely fashion.  The patient/family expressed understanding and agreement with this plan.  The patient will follow-up with their PCP in 1-2 days for reevaluation.           PROCEDURES:  Procedures    CRITICAL CARE TIME        FINAL IMPRESSION      1. Vertigo    2. Acute nonintractable headache, unspecified headache type          DISPOSITION/PLAN     ED Disposition       ED  Disposition   Discharge    Condition   Stable    Comment   --                 Comment: Please note this report has been produced using speech recognition software.      Georgi Zaldivar MD  Attending Emergency Physician             Georgi Zaldivar MD  08/14/23 2050

## 2024-11-27 NOTE — PATIENT PROFILE ADULT. - FUNCTIONAL SCREEN CURRENT LEVEL: EATING, MLM
(0) independent
Patient requests all Lab, Cardiology, and Radiology Results on their Discharge Instructions
